# Patient Record
Sex: FEMALE | Race: WHITE | NOT HISPANIC OR LATINO | Employment: UNEMPLOYED | URBAN - NONMETROPOLITAN AREA
[De-identification: names, ages, dates, MRNs, and addresses within clinical notes are randomized per-mention and may not be internally consistent; named-entity substitution may affect disease eponyms.]

---

## 2022-06-25 ENCOUNTER — HOSPITAL ENCOUNTER (EMERGENCY)
Facility: HOSPITAL | Age: 27
Discharge: PSYCHIATRIC OR SUBSTANCE USE FACILITY | End: 2022-06-26
Attending: FAMILY MEDICINE

## 2022-06-25 ENCOUNTER — APPOINTMENT (OUTPATIENT)
Dept: MRI IMAGING | Facility: HOSPITAL | Age: 27
End: 2022-06-25

## 2022-06-25 ENCOUNTER — APPOINTMENT (OUTPATIENT)
Dept: CT IMAGING | Facility: HOSPITAL | Age: 27
End: 2022-06-25

## 2022-06-25 DIAGNOSIS — R45.851 DEPRESSION WITH SUICIDAL IDEATION: Primary | ICD-10-CM

## 2022-06-25 DIAGNOSIS — F32.A DEPRESSION WITH SUICIDAL IDEATION: Primary | ICD-10-CM

## 2022-06-25 DIAGNOSIS — D33.2 BENIGN NEOPLASM OF BRAIN, UNSPECIFIED BRAIN REGION: ICD-10-CM

## 2022-06-25 LAB
ALBUMIN SERPL-MCNC: 3.96 G/DL (ref 3.5–5.2)
ALBUMIN/GLOB SERPL: 1.6 G/DL
ALP SERPL-CCNC: 85 U/L (ref 39–117)
ALT SERPL W P-5'-P-CCNC: 21 U/L (ref 1–33)
AMPHET+METHAMPHET UR QL: NEGATIVE
AMPHETAMINES UR QL: NEGATIVE
ANION GAP SERPL CALCULATED.3IONS-SCNC: 12 MMOL/L (ref 5–15)
AST SERPL-CCNC: 16 U/L (ref 1–32)
B-HCG UR QL: NEGATIVE
BACTERIA UR QL AUTO: ABNORMAL /HPF
BARBITURATES UR QL SCN: NEGATIVE
BASOPHILS # BLD AUTO: 0.04 10*3/MM3 (ref 0–0.2)
BASOPHILS NFR BLD AUTO: 0.5 % (ref 0–1.5)
BENZODIAZ UR QL SCN: NEGATIVE
BILIRUB SERPL-MCNC: 0.2 MG/DL (ref 0–1.2)
BILIRUB UR QL STRIP: NEGATIVE
BUN SERPL-MCNC: 15 MG/DL (ref 6–20)
BUN/CREAT SERPL: 20.3 (ref 7–25)
BUPRENORPHINE SERPL-MCNC: NEGATIVE NG/ML
CALCIUM SPEC-SCNC: 9 MG/DL (ref 8.6–10.5)
CANNABINOIDS SERPL QL: NEGATIVE
CHLORIDE SERPL-SCNC: 105 MMOL/L (ref 98–107)
CLARITY UR: CLEAR
CO2 SERPL-SCNC: 24 MMOL/L (ref 22–29)
COCAINE UR QL: NEGATIVE
COLOR UR: YELLOW
CREAT SERPL-MCNC: 0.74 MG/DL (ref 0.57–1)
DEPRECATED RDW RBC AUTO: 53.8 FL (ref 37–54)
EGFRCR SERPLBLD CKD-EPI 2021: 114.6 ML/MIN/1.73
EOSINOPHIL # BLD AUTO: 0.26 10*3/MM3 (ref 0–0.4)
EOSINOPHIL NFR BLD AUTO: 3.3 % (ref 0.3–6.2)
ERYTHROCYTE [DISTWIDTH] IN BLOOD BY AUTOMATED COUNT: 15.9 % (ref 12.3–15.4)
ETHANOL BLD-MCNC: <10 MG/DL (ref 0–10)
ETHANOL UR QL: <0.01 %
FLUAV SUBTYP SPEC NAA+PROBE: NOT DETECTED
FLUBV RNA ISLT QL NAA+PROBE: NOT DETECTED
GLOBULIN UR ELPH-MCNC: 2.4 GM/DL
GLUCOSE SERPL-MCNC: 95 MG/DL (ref 65–99)
GLUCOSE UR STRIP-MCNC: NEGATIVE MG/DL
HCT VFR BLD AUTO: 35.1 % (ref 34–46.6)
HGB BLD-MCNC: 11.8 G/DL (ref 12–15.9)
HGB UR QL STRIP.AUTO: ABNORMAL
HOLD SPECIMEN: NORMAL
HOLD SPECIMEN: NORMAL
HYALINE CASTS UR QL AUTO: ABNORMAL /LPF
IMM GRANULOCYTES # BLD AUTO: 0.03 10*3/MM3 (ref 0–0.05)
IMM GRANULOCYTES NFR BLD AUTO: 0.4 % (ref 0–0.5)
KETONES UR QL STRIP: ABNORMAL
LEUKOCYTE ESTERASE UR QL STRIP.AUTO: NEGATIVE
LYMPHOCYTES # BLD AUTO: 2.7 10*3/MM3 (ref 0.7–3.1)
LYMPHOCYTES NFR BLD AUTO: 33.9 % (ref 19.6–45.3)
MAGNESIUM SERPL-MCNC: 2 MG/DL (ref 1.6–2.6)
MCH RBC QN AUTO: 31 PG (ref 26.6–33)
MCHC RBC AUTO-ENTMCNC: 33.6 G/DL (ref 31.5–35.7)
MCV RBC AUTO: 92.1 FL (ref 79–97)
METHADONE UR QL SCN: NEGATIVE
MONOCYTES # BLD AUTO: 0.6 10*3/MM3 (ref 0.1–0.9)
MONOCYTES NFR BLD AUTO: 7.5 % (ref 5–12)
NEUTROPHILS NFR BLD AUTO: 4.34 10*3/MM3 (ref 1.7–7)
NEUTROPHILS NFR BLD AUTO: 54.4 % (ref 42.7–76)
NITRITE UR QL STRIP: NEGATIVE
NRBC BLD AUTO-RTO: 0 /100 WBC (ref 0–0.2)
OPIATES UR QL: NEGATIVE
OXYCODONE UR QL SCN: NEGATIVE
PCP UR QL SCN: NEGATIVE
PH UR STRIP.AUTO: 6 [PH] (ref 5–8)
PLATELET # BLD AUTO: 158 10*3/MM3 (ref 140–450)
PMV BLD AUTO: 10.2 FL (ref 6–12)
POTASSIUM SERPL-SCNC: 3.9 MMOL/L (ref 3.5–5.2)
PROPOXYPH UR QL: NEGATIVE
PROT SERPL-MCNC: 6.4 G/DL (ref 6–8.5)
PROT UR QL STRIP: NEGATIVE
RBC # BLD AUTO: 3.81 10*6/MM3 (ref 3.77–5.28)
RBC # UR STRIP: ABNORMAL /HPF
REF LAB TEST METHOD: ABNORMAL
SARS-COV-2 RNA PNL SPEC NAA+PROBE: NOT DETECTED
SODIUM SERPL-SCNC: 141 MMOL/L (ref 136–145)
SP GR UR STRIP: >=1.03 (ref 1–1.03)
SQUAMOUS #/AREA URNS HPF: ABNORMAL /HPF
TRICYCLICS UR QL SCN: NEGATIVE
UROBILINOGEN UR QL STRIP: ABNORMAL
WBC # UR STRIP: ABNORMAL /HPF
WBC NRBC COR # BLD: 7.97 10*3/MM3 (ref 3.4–10.8)
WHOLE BLOOD HOLD COAG: NORMAL
WHOLE BLOOD HOLD SPECIMEN: NORMAL

## 2022-06-25 PROCEDURE — 83735 ASSAY OF MAGNESIUM: CPT | Performed by: PHYSICIAN ASSISTANT

## 2022-06-25 PROCEDURE — 70553 MRI BRAIN STEM W/O & W/DYE: CPT

## 2022-06-25 PROCEDURE — 85025 COMPLETE CBC W/AUTO DIFF WBC: CPT | Performed by: PHYSICIAN ASSISTANT

## 2022-06-25 PROCEDURE — 70470 CT HEAD/BRAIN W/O & W/DYE: CPT

## 2022-06-25 PROCEDURE — 82077 ASSAY SPEC XCP UR&BREATH IA: CPT | Performed by: PHYSICIAN ASSISTANT

## 2022-06-25 PROCEDURE — 25010000002 IOPAMIDOL 61 % SOLUTION: Performed by: STUDENT IN AN ORGANIZED HEALTH CARE EDUCATION/TRAINING PROGRAM

## 2022-06-25 PROCEDURE — 81001 URINALYSIS AUTO W/SCOPE: CPT | Performed by: PHYSICIAN ASSISTANT

## 2022-06-25 PROCEDURE — 0 GADOBENATE DIMEGLUMINE 529 MG/ML SOLUTION: Performed by: STUDENT IN AN ORGANIZED HEALTH CARE EDUCATION/TRAINING PROGRAM

## 2022-06-25 PROCEDURE — 99285 EMERGENCY DEPT VISIT HI MDM: CPT

## 2022-06-25 PROCEDURE — 80306 DRUG TEST PRSMV INSTRMNT: CPT | Performed by: PHYSICIAN ASSISTANT

## 2022-06-25 PROCEDURE — 87636 SARSCOV2 & INF A&B AMP PRB: CPT | Performed by: STUDENT IN AN ORGANIZED HEALTH CARE EDUCATION/TRAINING PROGRAM

## 2022-06-25 PROCEDURE — 36415 COLL VENOUS BLD VENIPUNCTURE: CPT

## 2022-06-25 PROCEDURE — 80053 COMPREHEN METABOLIC PANEL: CPT | Performed by: PHYSICIAN ASSISTANT

## 2022-06-25 PROCEDURE — 81025 URINE PREGNANCY TEST: CPT | Performed by: PHYSICIAN ASSISTANT

## 2022-06-25 PROCEDURE — A9577 INJ MULTIHANCE: HCPCS | Performed by: STUDENT IN AN ORGANIZED HEALTH CARE EDUCATION/TRAINING PROGRAM

## 2022-06-25 PROCEDURE — C9803 HOPD COVID-19 SPEC COLLECT: HCPCS | Performed by: STUDENT IN AN ORGANIZED HEALTH CARE EDUCATION/TRAINING PROGRAM

## 2022-06-25 RX ADMIN — IOPAMIDOL 85 ML: 612 INJECTION, SOLUTION INTRAVENOUS at 20:11

## 2022-06-25 RX ADMIN — GADOBENATE DIMEGLUMINE 15 ML: 529 INJECTION, SOLUTION INTRAVENOUS at 22:10

## 2022-06-26 ENCOUNTER — HOSPITAL ENCOUNTER (INPATIENT)
Facility: HOSPITAL | Age: 27
LOS: 5 days | Discharge: NURSING, REHAB OR LONG-TERM CARE | End: 2022-07-01
Attending: PSYCHIATRY & NEUROLOGY | Admitting: PSYCHIATRY & NEUROLOGY

## 2022-06-26 VITALS
DIASTOLIC BLOOD PRESSURE: 72 MMHG | TEMPERATURE: 97.8 F | HEART RATE: 68 BPM | WEIGHT: 166 LBS | HEIGHT: 64 IN | RESPIRATION RATE: 17 BRPM | BODY MASS INDEX: 28.34 KG/M2 | OXYGEN SATURATION: 100 % | SYSTOLIC BLOOD PRESSURE: 113 MMHG

## 2022-06-26 PROBLEM — R45.851 SUICIDAL IDEATIONS: Status: ACTIVE | Noted: 2022-06-26

## 2022-06-26 LAB
QT INTERVAL: 404 MS
QTC INTERVAL: 416 MS

## 2022-06-26 PROCEDURE — 93005 ELECTROCARDIOGRAM TRACING: CPT | Performed by: PSYCHIATRY & NEUROLOGY

## 2022-06-26 PROCEDURE — 99223 1ST HOSP IP/OBS HIGH 75: CPT | Performed by: PSYCHIATRY & NEUROLOGY

## 2022-06-26 RX ORDER — HYDROXYZINE 50 MG/1
50 TABLET, FILM COATED ORAL EVERY 6 HOURS PRN
Status: DISCONTINUED | OUTPATIENT
Start: 2022-06-26 | End: 2022-07-01 | Stop reason: HOSPADM

## 2022-06-26 RX ORDER — BENZTROPINE MESYLATE 1 MG/ML
1 INJECTION INTRAMUSCULAR; INTRAVENOUS ONCE AS NEEDED
Status: DISCONTINUED | OUTPATIENT
Start: 2022-06-26 | End: 2022-07-01 | Stop reason: HOSPADM

## 2022-06-26 RX ORDER — FAMOTIDINE 20 MG/1
20 TABLET, FILM COATED ORAL 2 TIMES DAILY PRN
Status: DISCONTINUED | OUTPATIENT
Start: 2022-06-26 | End: 2022-07-01 | Stop reason: HOSPADM

## 2022-06-26 RX ORDER — NICOTINE 21 MG/24HR
1 PATCH, TRANSDERMAL 24 HOURS TRANSDERMAL
Status: DISCONTINUED | OUTPATIENT
Start: 2022-06-26 | End: 2022-07-01 | Stop reason: HOSPADM

## 2022-06-26 RX ORDER — BENZONATATE 100 MG/1
100 CAPSULE ORAL 3 TIMES DAILY PRN
Status: DISCONTINUED | OUTPATIENT
Start: 2022-06-26 | End: 2022-07-01 | Stop reason: HOSPADM

## 2022-06-26 RX ORDER — BENZTROPINE MESYLATE 1 MG/1
2 TABLET ORAL ONCE AS NEEDED
Status: DISCONTINUED | OUTPATIENT
Start: 2022-06-26 | End: 2022-07-01 | Stop reason: HOSPADM

## 2022-06-26 RX ORDER — FLUOXETINE HYDROCHLORIDE 20 MG/1
20 CAPSULE ORAL DAILY
COMMUNITY
End: 2022-07-01 | Stop reason: HOSPADM

## 2022-06-26 RX ORDER — ARIPIPRAZOLE 10 MG/1
5 TABLET ORAL NIGHTLY
Status: DISCONTINUED | OUTPATIENT
Start: 2022-06-26 | End: 2022-06-28

## 2022-06-26 RX ORDER — FLUOXETINE HYDROCHLORIDE 20 MG/1
40 CAPSULE ORAL DAILY
Status: DISCONTINUED | OUTPATIENT
Start: 2022-06-26 | End: 2022-07-01 | Stop reason: HOSPADM

## 2022-06-26 RX ORDER — IBUPROFEN 400 MG/1
400 TABLET ORAL EVERY 6 HOURS PRN
Status: DISCONTINUED | OUTPATIENT
Start: 2022-06-26 | End: 2022-07-01 | Stop reason: HOSPADM

## 2022-06-26 RX ORDER — ALUMINA, MAGNESIA, AND SIMETHICONE 2400; 2400; 240 MG/30ML; MG/30ML; MG/30ML
15 SUSPENSION ORAL EVERY 6 HOURS PRN
Status: DISCONTINUED | OUTPATIENT
Start: 2022-06-26 | End: 2022-07-01 | Stop reason: HOSPADM

## 2022-06-26 RX ORDER — ONDANSETRON 4 MG/1
4 TABLET, FILM COATED ORAL EVERY 6 HOURS PRN
Status: DISCONTINUED | OUTPATIENT
Start: 2022-06-26 | End: 2022-07-01 | Stop reason: HOSPADM

## 2022-06-26 RX ORDER — TRAZODONE HYDROCHLORIDE 50 MG/1
50 TABLET ORAL NIGHTLY PRN
Status: DISCONTINUED | OUTPATIENT
Start: 2022-06-26 | End: 2022-07-01 | Stop reason: HOSPADM

## 2022-06-26 RX ORDER — ACETAMINOPHEN 325 MG/1
650 TABLET ORAL EVERY 6 HOURS PRN
Status: DISCONTINUED | OUTPATIENT
Start: 2022-06-26 | End: 2022-07-01 | Stop reason: HOSPADM

## 2022-06-26 RX ORDER — ECHINACEA PURPUREA EXTRACT 125 MG
2 TABLET ORAL AS NEEDED
Status: DISCONTINUED | OUTPATIENT
Start: 2022-06-26 | End: 2022-07-01 | Stop reason: HOSPADM

## 2022-06-26 RX ORDER — FLUOXETINE HYDROCHLORIDE 20 MG/1
20 CAPSULE ORAL DAILY
Status: CANCELLED | OUTPATIENT
Start: 2022-06-26

## 2022-06-26 RX ORDER — LOPERAMIDE HYDROCHLORIDE 2 MG/1
2 CAPSULE ORAL
Status: DISCONTINUED | OUTPATIENT
Start: 2022-06-26 | End: 2022-07-01 | Stop reason: HOSPADM

## 2022-06-26 RX ADMIN — HYDROXYZINE HYDROCHLORIDE 50 MG: 50 TABLET ORAL at 21:45

## 2022-06-26 RX ADMIN — Medication 1 PATCH: at 11:03

## 2022-06-26 RX ADMIN — ARIPIPRAZOLE 5 MG: 10 TABLET ORAL at 21:48

## 2022-06-26 RX ADMIN — FLUOXETINE HYDROCHLORIDE 40 MG: 20 CAPSULE ORAL at 15:19

## 2022-06-27 LAB
25(OH)D3 SERPL-MCNC: 23.7 NG/ML (ref 30–100)
TSH SERPL DL<=0.05 MIU/L-ACNC: 5.46 UIU/ML (ref 0.27–4.2)
VIT B12 BLD-MCNC: 514 PG/ML (ref 211–946)

## 2022-06-27 PROCEDURE — 82607 VITAMIN B-12: CPT | Performed by: PSYCHIATRY & NEUROLOGY

## 2022-06-27 PROCEDURE — 99232 SBSQ HOSP IP/OBS MODERATE 35: CPT | Performed by: PSYCHIATRY & NEUROLOGY

## 2022-06-27 PROCEDURE — 84479 ASSAY OF THYROID (T3 OR T4): CPT | Performed by: PSYCHIATRY & NEUROLOGY

## 2022-06-27 PROCEDURE — 84443 ASSAY THYROID STIM HORMONE: CPT | Performed by: PSYCHIATRY & NEUROLOGY

## 2022-06-27 PROCEDURE — 82306 VITAMIN D 25 HYDROXY: CPT | Performed by: PSYCHIATRY & NEUROLOGY

## 2022-06-27 PROCEDURE — 84436 ASSAY OF TOTAL THYROXINE: CPT | Performed by: PSYCHIATRY & NEUROLOGY

## 2022-06-27 RX ADMIN — ARIPIPRAZOLE 5 MG: 10 TABLET ORAL at 21:43

## 2022-06-27 RX ADMIN — HYDROXYZINE HYDROCHLORIDE 50 MG: 50 TABLET ORAL at 21:43

## 2022-06-27 RX ADMIN — Medication 1 PATCH: at 08:24

## 2022-06-27 RX ADMIN — FLUOXETINE HYDROCHLORIDE 40 MG: 20 CAPSULE ORAL at 08:24

## 2022-06-28 LAB
T-UPTAKE NFR SERPL: 1 TBI (ref 0.8–1.3)
T4 SERPL-MCNC: 8.33 MCG/DL (ref 4.5–11.7)
TSH SERPL DL<=0.05 MIU/L-ACNC: 5.46 UIU/ML (ref 0.27–4.2)

## 2022-06-28 PROCEDURE — 99232 SBSQ HOSP IP/OBS MODERATE 35: CPT | Performed by: PSYCHIATRY & NEUROLOGY

## 2022-06-28 RX ORDER — ARIPIPRAZOLE 10 MG/1
5 TABLET ORAL ONCE
Status: DISCONTINUED | OUTPATIENT
Start: 2022-06-28 | End: 2022-06-28

## 2022-06-28 RX ORDER — ARIPIPRAZOLE 10 MG/1
5 TABLET ORAL DAILY
Status: DISCONTINUED | OUTPATIENT
Start: 2022-06-28 | End: 2022-07-01 | Stop reason: HOSPADM

## 2022-06-28 RX ADMIN — HYDROXYZINE HYDROCHLORIDE 50 MG: 50 TABLET ORAL at 20:45

## 2022-06-28 RX ADMIN — Medication 1 PATCH: at 09:45

## 2022-06-28 RX ADMIN — ARIPIPRAZOLE 5 MG: 10 TABLET ORAL at 09:45

## 2022-06-28 RX ADMIN — FLUOXETINE HYDROCHLORIDE 40 MG: 20 CAPSULE ORAL at 09:45

## 2022-06-29 PROCEDURE — 99232 SBSQ HOSP IP/OBS MODERATE 35: CPT | Performed by: PSYCHIATRY & NEUROLOGY

## 2022-06-29 RX ADMIN — Medication 1 PATCH: at 08:51

## 2022-06-29 RX ADMIN — FLUOXETINE HYDROCHLORIDE 40 MG: 20 CAPSULE ORAL at 08:51

## 2022-06-29 RX ADMIN — ACETAMINOPHEN 650 MG: 325 TABLET ORAL at 09:49

## 2022-06-29 RX ADMIN — ARIPIPRAZOLE 5 MG: 10 TABLET ORAL at 08:51

## 2022-06-30 PROCEDURE — 99232 SBSQ HOSP IP/OBS MODERATE 35: CPT | Performed by: PSYCHIATRY & NEUROLOGY

## 2022-06-30 RX ADMIN — HYDROXYZINE HYDROCHLORIDE 50 MG: 50 TABLET ORAL at 23:17

## 2022-06-30 RX ADMIN — ARIPIPRAZOLE 5 MG: 10 TABLET ORAL at 08:43

## 2022-06-30 RX ADMIN — FLUOXETINE HYDROCHLORIDE 40 MG: 20 CAPSULE ORAL at 08:43

## 2022-06-30 RX ADMIN — Medication 1 PATCH: at 08:43

## 2022-07-01 VITALS
BODY MASS INDEX: 27.79 KG/M2 | TEMPERATURE: 97.9 F | OXYGEN SATURATION: 95 % | WEIGHT: 162.8 LBS | DIASTOLIC BLOOD PRESSURE: 82 MMHG | SYSTOLIC BLOOD PRESSURE: 131 MMHG | RESPIRATION RATE: 18 BRPM | HEIGHT: 64 IN | HEART RATE: 84 BPM

## 2022-07-01 PROBLEM — F33.3 MAJOR DEPRESSIVE DISORDER, RECURRENT EPISODE, SEVERE, WITH PSYCHOSIS: Status: ACTIVE | Noted: 2022-06-26

## 2022-07-01 PROCEDURE — 99239 HOSP IP/OBS DSCHRG MGMT >30: CPT | Performed by: PSYCHIATRY & NEUROLOGY

## 2022-07-01 RX ORDER — ARIPIPRAZOLE 5 MG/1
5 TABLET ORAL DAILY
Qty: 30 TABLET | Refills: 0 | Status: SHIPPED | OUTPATIENT
Start: 2022-07-02 | End: 2022-07-18 | Stop reason: HOSPADM

## 2022-07-01 RX ORDER — FLUOXETINE HYDROCHLORIDE 40 MG/1
40 CAPSULE ORAL DAILY
Qty: 30 CAPSULE | Refills: 0 | Status: ON HOLD | OUTPATIENT
Start: 2022-07-02 | End: 2022-07-18 | Stop reason: SDUPTHER

## 2022-07-01 RX ORDER — TRAZODONE HYDROCHLORIDE 50 MG/1
50 TABLET ORAL NIGHTLY PRN
Qty: 30 TABLET | Refills: 0 | Status: SHIPPED | OUTPATIENT
Start: 2022-07-01 | End: 2022-07-01

## 2022-07-01 RX ADMIN — FLUOXETINE HYDROCHLORIDE 40 MG: 20 CAPSULE ORAL at 08:42

## 2022-07-01 RX ADMIN — ARIPIPRAZOLE 5 MG: 10 TABLET ORAL at 08:42

## 2022-07-01 RX ADMIN — Medication 1 PATCH: at 08:42

## 2022-07-01 NOTE — NURSING NOTE
"Notified Dr. GAMALIEL Philippe and Lily, Therapist and Lily RN, Lead,  of  Zuleyma FELIZ LPN report of Patient verbalizing  goal statement of \" not wanting to rape Daughter\" . Patient denies thoughts of harm rape , suicidal or homicidal ideation to this RN .   "

## 2022-07-01 NOTE — PLAN OF CARE
Goal Outcome Evaluation:  Plan of Care Reviewed With: patient  Patient Agreement with Plan of Care: agrees        Outcome Evaluation: Patient denies anxiety, depression, SI/HI, or AVH.  Patient cooperative this shift.

## 2022-07-01 NOTE — PROGRESS NOTES
" Inpatient Psych Progress Note     Clinician: Augie Philippe MD  Admission Date: 6/26/2022  08:42 EDT 07/01/22    Behavioral Health Treatment Plan and Problem List: I have reviewed and approved the Behavioral Health Treatment Plan and Problem list.    Allergies  Allergies   Allergen Reactions   • Augmentin [Amoxicillin-Pot Clavulanate] Anaphylaxis and Hives   • Latex Anaphylaxis and Hives   • Phenergan [Promethazine] Anaphylaxis and Hives   • Seroquel [Quetiapine] Anaphylaxis and Hives       Hospital Day: 5 days      Assessment completed within view of staff    History  CC/clinical focus: depression    Interval HPI: Patient seen and evaluated by me.  Chart reviewed.  During group patient stated that her goal was to avoid raping her child.    Patient reports that depression much improved since admission.  No SI.  She is able to express hope in the future and is future oriented.  Denies cravings for drugs this morning.  Med Compliant.  ROS otherwise as below.      Interval Review of Systems:   General ROS: negative for - fever or malaise  Endocrine ROS: negative for - palpitations  Respiratory ROS: no cough, shortness of breath, or wheezing  Cardiovascular ROS: no chest pain or dyspnea on exertion  Gastrointestinal ROS: no abdominal pain,no black or bloody stools    /82 (BP Location: Right arm, Patient Position: Lying)   Pulse 84   Temp 97.9 °F (36.6 °C) (Temporal)   Resp 18   Ht 162.6 cm (64\")   Wt 73.8 kg (162 lb 12.8 oz)   LMP  (LMP Unknown)   SpO2 95%   BMI 27.94 kg/m²     Mental Status Exam  Mood: \"okay\"  Affect: mood congruent  Thought Processes: linear  Thought Content: No Delusions voiced  Hallucinations: Denies  Suicidal Thoughts: Denies  Suicidal Plan/Intent: Denies          Medical Decision Making:   Labs:     Lab Results (last 24 hours)     ** No results found for the last 24 hours. **            Radiology:     Imaging Results (Last 24 Hours)     ** No results found for the last 24 hours. ** "            EKG:     ECG/EMG Results (most recent)     Procedure Component Value Units Date/Time    ECG 12 Lead [[id number]] Collected: 06/26/22 0336     Updated: 06/26/22 2256     QT Interval 404 ms      QTC Interval 416 ms     Narrative:      Test Reason : Baseline Cardiac Status  Blood Pressure :   */*   mmHG  Vent. Rate :  64 BPM     Atrial Rate :  64 BPM     P-R Int : 190 ms          QRS Dur : 102 ms      QT Int : 404 ms       P-R-T Axes :  64  63  57 degrees     QTc Int : 416 ms    Normal sinus rhythm with sinus arrhythmia  Normal ECG  No previous ECGs available  Confirmed by Chayo Farmer ([id number]) on 6/26/2022 10:56:27 PM    Referred By:            Confirmed By: Chayo Farmer           Medications:  ARIPiprazole, 5 mg, Oral, Daily  FLUoxetine, 40 mg, Oral, Daily  nicotine, 1 patch, Transdermal, Q24H           All medications reviewed.      Assessment and Plan:    Major Depressive Disorder, recurrent, severe with psychosis  Continue prozac to 40mg Daily   Continue Abilify to AM 5mg         Amphetamine Use Disorder,   Opiate Use Disorder  - +cravings, no withdrawal Sx today.  - CD Counseling  - We have secured a bed at [facility]. Will plan to discharge later today or on Saturday directly to [facility].  Will prepare medications and discharge summary.        Brain Tumor (B9)  - Patient doesn't know the type, present since birth, B9.  Followup with neurosurgery, a Dr. Romero in [place], but last visit was 8 years ago. MRI complete on 6/25 - shows 2.5cm neoplasm.   - We have setup a followup appointment with neurology.  During group patient stated that her goal was to avoid raping her child.  Will discuss with Lliy and address accordingly.    Continue hospitalization for safety and stabilization.  Continue current level of Special Precautions (q15 minute checks).

## 2022-07-01 NOTE — NURSING NOTE
"PT APPROACHED THIS STAFF MEMBER AFTER WHAT GOALS SHE WANTED TO SET FOR HERSELF TODAY AND STATED 'I WANT TO CHANGE MY GOAL TO I DON'T WANT TO RAPE MY DAUGHTER PIPER.\" when ASKED IF SHE HAD EVER DONE THIS SHE REPLIED \"NO\" AND WHEN ASKED IF SHE HAD EVER HAD THESE THOUGHTS BEFORE SHE REPLIED \"NO\".  AFTER WALKING AWAY FROM PT, THE PT CAME TO STAFF MEMBER BERRY STATING AGAIN THAT SHE DID NOT WANT TO RAPE HER DAUGHTER PIPER. PT'S RN AND THERAPIST MADE AWARE OF PTS COMMENTS. MD DR GAMALIEL KRUGER ALSO MADE AWARE  "

## 2022-07-01 NOTE — DISCHARGE SUMMARY
Date of Discharge:  2022    Discharge Diagnosis:Principal Problem:    Major depressive disorder, recurrent episode, severe, with psychosis (HCC)        Presenting Problem/History of Present Illness:  26 y.o. female who was admitted on 2022 and evaluated on 2022 with complaints of depression. Patient  presents to our ER with complaints of auditory hallucinations and parnoia that have been occurring for the past 3 months. Patient  states that these are usually voices that talk about , or sound like her mother who  when patient was 12. Patient reports worsening depression and suicidal ideation. Patient  reports holding a knife to wrist yesterday, but pulling it back before she harmed herself.  Patient states that she uses meth, suboxone, and marijuana. Patient denies any alcohol abuse. Patient states that she uses tobacco. Patient states not having her children as a stressor in her life. Patient denies any history of physical, mental or sexual abuse. Patient rates her appetite as fair. Patient rates her sleep as fair. Patient denies any nightmares. Patient rates her anxiety on a scale of 1/10 with 10 being the most severe a 10. Patient rates her depression on a scale of 1/10 with 10 being the most severe a 10. Patient states that she has suicidal ideation. Patient denies any homicidal ideation. Patient states that she has hallucinations.     Hospital Course:  Patient was admitted for safety and stabilization.   Routine labs were checked.  Patient was assigned a masters level therapist and provided with an opportunity to participate in group and individual therapy and counseling on the unit.  Patient started on Abilify 5mg at bedtime and increased home dose of prozac from 20mg to 40mg.  Patient tolerated well and noted significant benefit.  Patient voiced significant concerns regarding past drug use and expressed strong interest in discharge to a rehab facility.  Arrangements made to secure a bed  for her at [facility].  She was discharged in stable condition to the facility.    It should also be noted that the patient has a known h/o B9 Brain Tumor. Patient doesn't know the type, present since birth, B9.  Followup with neurosurgery, a Dr. Romero in [place], but last visit was 8 years ago. MRI complete on 6/25 - shows 2.5cm neoplasm.   - She will need to followup as an outpatient.        Consults:   Consults     No orders found from 5/28/2022 to 6/27/2022.          Labs:  Lab Results (all)     Procedure Component Value Units Date/Time    Thyroid Panel With TSH [[id number]]  (Abnormal) Collected: 06/27/22 0515    Specimen: Blood Updated: 06/28/22 0506     TSH 5.460 uIU/mL      T Uptake 1.00 TBI      T4, Total 8.33 mcg/dL      Comment: T4 results may be falsely increased if patient taking Biotin.       Vitamin D 25 Hydroxy [[id number]]  (Abnormal) Collected: 06/27/22 0515    Specimen: Blood Updated: 06/27/22 1351     25 Hydroxy, Vitamin D 23.7 ng/ml     Narrative:      Reference Range for Total Vitamin D 25(OH)     Deficiency <20.0 ng/mL   Insufficiency 21-29 ng/mL   Sufficiency  ng/mL  Toxicity >100 ng/ml    Results may be falsely increased if patient taking Biotin.      Vitamin B12 [[id number]]  (Normal) Collected: 06/27/22 0515    Specimen: Blood Updated: 06/27/22 1351     Vitamin B-12 514 pg/mL     Narrative:      Results may be falsely increased if patient taking Biotin.      TSH [[id number]]  (Abnormal) Collected: 06/27/22 0515    Specimen: Blood Updated: 06/27/22 0640     TSH 5.460 uIU/mL           Imaging:  Imaging Results (All)     None            Condition on Discharge: stable    Interval Review of Systems at time of Discharge:  General ROS: negative for - fever.    Endocrine ROS: negative for - palpitations  Respiratory ROS: no cough, shortness of breath, or wheezing  Cardiovascular ROS: no chest pain or dyspnea on exertion  Gastrointestinal ROS: no abdominal pain,no black or bloody  stools    Mental Status Exam at time of Discharge:  Patient’s sensorium is intact.  Behavior is pleasant and cooperative. Speech is clear, fluent, and of average rate/rhythm/volume. Mood is euthymic. Affect mood congruent. Thought processes are organized and goal directed. No AVH. No delusions voiced.  Insight and Judgment are intact. Denies SI.  Denies HI.      Vital Signs  Temp:  [97.9 °F (36.6 °C)-98 °F (36.7 °C)] 97.9 °F (36.6 °C)  Heart Rate:  [73-84] 84  Resp:  [18] 18  BP: (118-131)/(76-82) 131/82    Discharge Disposition  Rehab Facility or Unit (DC - External)    Discharge Medications     Discharge Medications      New Medications      Instructions Start Date   ARIPiprazole 5 MG tablet  Commonly known as: ABILIFY   5 mg, Oral, Daily   Start Date: July 2, 2022        Changes to Medications      Instructions Start Date   FLUoxetine 40 MG capsule  Commonly known as: PROzac  What changed:   · medication strength  · how much to take   40 mg, Oral, Daily   Start Date: July 2, 2022            Discharge Diet: Regular    Activity at Discharge: As tolerated    Follow-up Appointments:    Future Appointments   Date Time Provider Department Center   7/13/2022 11:30 AM Houston Banks MD [facility]           Time: I spent 33 minutes on this discharge activity which included: face-to-face encounter with the patient, reviewing the data in the system, coordination of the care with the nursing staff as well as consultants, documentation, and entering orders.      Augie Philippe MD  07/01/22  16:35 EDT

## 2022-07-01 NOTE — PLAN OF CARE
Problem: Adult Behavioral Health Plan of Care  Goal: Optimized Coping Skills in Response to Life Stressors  Outcome: Ongoing, Progressing  Intervention: Promote Effective Coping Strategies  Flowsheets (Taken 7/1/2022 0850)  Supportive Measures:  • active listening utilized  • counseling provided  Goal: Develops/Participates in Therapeutic [place] to Support Successful Transition  Outcome: Ongoing, Progressing  Intervention: Foster Therapeutic [place]  Flowsheets (Taken 7/1/2022 0753 by Elvira Mack RN)  Trust Relationship/Rapport:  • care explained  • emotional support provided  • questions answered  • questions encouraged  • thoughts/feelings acknowledged  Intervention: Mutually Develop Transition Plan  Flowsheets (Taken 6/27/2022 1023)  Transition Support:  • community resources reviewed  • follow-up care coordinated  • follow-up care discussed  • crisis management plan promoted      0850:     DATA:      Therapist met individually with patient this date. Patient agreeable. Reviewed medical record and staffed case with treatment team this date.       Clinical Maneuvering/Intervention:     Therapist assisted patient in processing above session content; acknowledged and normalized patient’s thoughts, feelings, and concerns.  Discussed the therapist/patient relationship and explain the parameters and limitations of relative confidentiality.  Also discussed the importance of active participation, and honesty to the treatment process.  Encouraged the patient to discuss/vent their feelings, frustrations, and fears concerning their ongoing medical issues and validated their feelings.    Concern was voiced regarding substance use and educated patient on risks associated with use. Patient was advised to abstain from use and educated on community resources that can help with sobriety and recovery.        Allowed patient to freely discuss issues without interruption or judgment. Provided safe, confidential  "environment to facilitate the development of positive therapeutic relationship and encourage open, honest communication.      Therapist addressed discharge safety planning this date. Assisted patient in identifying risk factors which would indicate the need for higher level of care after discharge;  including thoughts to harm self or others and/or self-harming behavior. Encouraged patient to call 911, or present to the nearest emergency room should any of these events occur. Discussed crisis intervention services and means to access.  Encouraged securing any objects of harm.          ASSESSMENT:      The patient was seen 1-1 in the office and Dr. Augie Philippe.  Patient denies SI/HI/AVH. Patient rates depression/anxiety at 0/10.  Patient future oriented and lists therapy as a healthy coping.  Patient reports good sleep and appetite.  RN staff had approached therapist about patient's report earlier in the day. Apparently patient had mentioned that her goal was to \"not rape her daughter.\"  Therapist reviewed this with Dr. Philippe and patient.  Patient reports that this has been a fear based and anxious thought that she has. She denies intent and states that she would never want to harm her children.  Currently her children are not in her custody and she does not have access. Therapist has been in contact with patient's sister Lucretia and confirmed that patient does not have contact with children at this time.  Patient adamantly denies intent of harm to her children.  This does not appear to meet criteria for dcbs reporting.     Contacted patient's sister Lucretia at [phone] and provided update. Lucretia agreeable and given contact for [facility].     Spoke with [facility] intake. They are agreeable to take patient today.  They were informed of her neuro appointment on 7/13 and agreeable. No other issues identified.         PLAN:       Patient can now discharge today to [facility].  Staff agreeable for intake " today.  Therapist to arrange [facility].      Therapist recommends residential compliance. Patient scheduled with Landen Deleon this date.

## 2022-07-13 ENCOUNTER — HOSPITAL ENCOUNTER (EMERGENCY)
Facility: HOSPITAL | Age: 27
Discharge: PSYCHIATRIC OR SUBSTANCE USE FACILITY | End: 2022-07-14
Attending: EMERGENCY MEDICINE

## 2022-07-13 VITALS
RESPIRATION RATE: 18 BRPM | TEMPERATURE: 98.7 F | WEIGHT: 169.97 LBS | SYSTOLIC BLOOD PRESSURE: 97 MMHG | OXYGEN SATURATION: 95 % | HEIGHT: 64 IN | BODY MASS INDEX: 29.02 KG/M2 | DIASTOLIC BLOOD PRESSURE: 64 MMHG | HEART RATE: 72 BPM

## 2022-07-13 DIAGNOSIS — R44.0 AUDITORY HALLUCINATIONS: ICD-10-CM

## 2022-07-13 DIAGNOSIS — R45.851 SUICIDAL IDEATION: Primary | ICD-10-CM

## 2022-07-13 DIAGNOSIS — F19.11 HISTORY OF SUBSTANCE ABUSE: ICD-10-CM

## 2022-07-13 DIAGNOSIS — U07.1 COVID-19: ICD-10-CM

## 2022-07-13 PROBLEM — F32.A DEPRESSION: Status: ACTIVE | Noted: 2022-07-13

## 2022-07-13 LAB
ALBUMIN SERPL-MCNC: 4.2 G/DL (ref 3.5–5.2)
ALBUMIN/GLOB SERPL: 1.4 G/DL
ALP SERPL-CCNC: 77 U/L (ref 39–117)
ALT SERPL W P-5'-P-CCNC: 31 U/L (ref 1–33)
AMPHET+METHAMPHET UR QL: NEGATIVE
ANION GAP SERPL CALCULATED.3IONS-SCNC: 10.2 MMOL/L (ref 5–15)
APAP SERPL-MCNC: <5 MCG/ML (ref 0–30)
AST SERPL-CCNC: 22 U/L (ref 1–32)
BARBITURATES UR QL SCN: NEGATIVE
BASOPHILS # BLD AUTO: 0.03 10*3/MM3 (ref 0–0.2)
BASOPHILS NFR BLD AUTO: 0.6 % (ref 0–1.5)
BENZODIAZ UR QL SCN: NEGATIVE
BILIRUB SERPL-MCNC: 0.4 MG/DL (ref 0–1.2)
BUN SERPL-MCNC: 19 MG/DL (ref 6–20)
BUN/CREAT SERPL: 25.3 (ref 7–25)
CALCIUM SPEC-SCNC: 9.4 MG/DL (ref 8.6–10.5)
CANNABINOIDS SERPL QL: NEGATIVE
CHLORIDE SERPL-SCNC: 107 MMOL/L (ref 98–107)
CO2 SERPL-SCNC: 24.8 MMOL/L (ref 22–29)
COCAINE UR QL: NEGATIVE
CREAT SERPL-MCNC: 0.75 MG/DL (ref 0.57–1)
DEPRECATED RDW RBC AUTO: 46.5 FL (ref 37–54)
EGFRCR SERPLBLD CKD-EPI 2021: 112.8 ML/MIN/1.73
EOSINOPHIL # BLD AUTO: 0.18 10*3/MM3 (ref 0–0.4)
EOSINOPHIL NFR BLD AUTO: 3.6 % (ref 0.3–6.2)
ERYTHROCYTE [DISTWIDTH] IN BLOOD BY AUTOMATED COUNT: 14.4 % (ref 12.3–15.4)
ETHANOL BLD-MCNC: <10 MG/DL (ref 0–10)
ETHANOL UR QL: <0.01 %
GLOBULIN UR ELPH-MCNC: 2.9 GM/DL
GLUCOSE SERPL-MCNC: 144 MG/DL (ref 65–99)
HCG INTACT+B SERPL-ACNC: <0.5 MIU/ML
HCT VFR BLD AUTO: 38.1 % (ref 34–46.6)
HGB BLD-MCNC: 13.3 G/DL (ref 12–15.9)
HOLD SPECIMEN: NORMAL
HOLD SPECIMEN: NORMAL
IMM GRANULOCYTES # BLD AUTO: 0.07 10*3/MM3 (ref 0–0.05)
IMM GRANULOCYTES NFR BLD AUTO: 1.4 % (ref 0–0.5)
LYMPHOCYTES # BLD AUTO: 1.55 10*3/MM3 (ref 0.7–3.1)
LYMPHOCYTES NFR BLD AUTO: 31.2 % (ref 19.6–45.3)
MCH RBC QN AUTO: 31.1 PG (ref 26.6–33)
MCHC RBC AUTO-ENTMCNC: 34.9 G/DL (ref 31.5–35.7)
MCV RBC AUTO: 89.2 FL (ref 79–97)
METHADONE UR QL SCN: NEGATIVE
MONOCYTES # BLD AUTO: 0.39 10*3/MM3 (ref 0.1–0.9)
MONOCYTES NFR BLD AUTO: 7.8 % (ref 5–12)
NEUTROPHILS NFR BLD AUTO: 2.75 10*3/MM3 (ref 1.7–7)
NEUTROPHILS NFR BLD AUTO: 55.4 % (ref 42.7–76)
NRBC BLD AUTO-RTO: 0 /100 WBC (ref 0–0.2)
OPIATES UR QL: NEGATIVE
OXYCODONE UR QL SCN: NEGATIVE
PLATELET # BLD AUTO: 202 10*3/MM3 (ref 140–450)
PMV BLD AUTO: 10.4 FL (ref 6–12)
POTASSIUM SERPL-SCNC: 3.8 MMOL/L (ref 3.5–5.2)
PROT SERPL-MCNC: 7.1 G/DL (ref 6–8.5)
RBC # BLD AUTO: 4.27 10*6/MM3 (ref 3.77–5.28)
SALICYLATES SERPL-MCNC: <0.3 MG/DL
SARS-COV-2 RNA RESP QL NAA+PROBE: DETECTED
SODIUM SERPL-SCNC: 142 MMOL/L (ref 136–145)
WBC NRBC COR # BLD: 4.97 10*3/MM3 (ref 3.4–10.8)
WHOLE BLOOD HOLD COAG: NORMAL
WHOLE BLOOD HOLD SPECIMEN: NORMAL

## 2022-07-13 PROCEDURE — 80307 DRUG TEST PRSMV CHEM ANLYZR: CPT | Performed by: EMERGENCY MEDICINE

## 2022-07-13 PROCEDURE — 80053 COMPREHEN METABOLIC PANEL: CPT | Performed by: EMERGENCY MEDICINE

## 2022-07-13 PROCEDURE — 80179 DRUG ASSAY SALICYLATE: CPT | Performed by: EMERGENCY MEDICINE

## 2022-07-13 PROCEDURE — 82077 ASSAY SPEC XCP UR&BREATH IA: CPT | Performed by: EMERGENCY MEDICINE

## 2022-07-13 PROCEDURE — U0003 INFECTIOUS AGENT DETECTION BY NUCLEIC ACID (DNA OR RNA); SEVERE ACUTE RESPIRATORY SYNDROME CORONAVIRUS 2 (SARS-COV-2) (CORONAVIRUS DISEASE [COVID-19]), AMPLIFIED PROBE TECHNIQUE, MAKING USE OF HIGH THROUGHPUT TECHNOLOGIES AS DESCRIBED BY CMS-2020-01-R: HCPCS | Performed by: EMERGENCY MEDICINE

## 2022-07-13 PROCEDURE — 84702 CHORIONIC GONADOTROPIN TEST: CPT | Performed by: EMERGENCY MEDICINE

## 2022-07-13 PROCEDURE — 85025 COMPLETE CBC W/AUTO DIFF WBC: CPT | Performed by: EMERGENCY MEDICINE

## 2022-07-13 PROCEDURE — 99284 EMERGENCY DEPT VISIT MOD MDM: CPT

## 2022-07-13 PROCEDURE — 80143 DRUG ASSAY ACETAMINOPHEN: CPT | Performed by: EMERGENCY MEDICINE

## 2022-07-13 RX ORDER — HALOPERIDOL 5 MG/1
5 TABLET ORAL EVERY 4 HOURS PRN
Status: DISCONTINUED | OUTPATIENT
Start: 2022-07-13 | End: 2022-07-18 | Stop reason: HOSPADM

## 2022-07-13 RX ORDER — LORAZEPAM 2 MG/1
2 TABLET ORAL EVERY 4 HOURS PRN
Status: DISCONTINUED | OUTPATIENT
Start: 2022-07-13 | End: 2022-07-15

## 2022-07-13 RX ORDER — DIPHENHYDRAMINE HYDROCHLORIDE 50 MG/ML
50 INJECTION INTRAMUSCULAR; INTRAVENOUS EVERY 4 HOURS PRN
Status: DISCONTINUED | OUTPATIENT
Start: 2022-07-13 | End: 2022-07-18 | Stop reason: HOSPADM

## 2022-07-13 RX ORDER — HYDROXYZINE PAMOATE 50 MG/1
50 CAPSULE ORAL EVERY 6 HOURS PRN
Status: DISCONTINUED | OUTPATIENT
Start: 2022-07-13 | End: 2022-07-18 | Stop reason: HOSPADM

## 2022-07-13 RX ORDER — LORAZEPAM 2 MG/ML
2 INJECTION INTRAMUSCULAR EVERY 4 HOURS PRN
Status: DISCONTINUED | OUTPATIENT
Start: 2022-07-13 | End: 2022-07-15

## 2022-07-13 RX ORDER — TRAZODONE HYDROCHLORIDE 100 MG/1
100 TABLET ORAL NIGHTLY PRN
Status: DISCONTINUED | OUTPATIENT
Start: 2022-07-13 | End: 2022-07-18 | Stop reason: HOSPADM

## 2022-07-13 RX ORDER — LOPERAMIDE HYDROCHLORIDE 2 MG/1
2 CAPSULE ORAL
Status: DISCONTINUED | OUTPATIENT
Start: 2022-07-13 | End: 2022-07-18 | Stop reason: HOSPADM

## 2022-07-13 RX ORDER — ALUMINA, MAGNESIA, AND SIMETHICONE 2400; 2400; 240 MG/30ML; MG/30ML; MG/30ML
15 SUSPENSION ORAL EVERY 6 HOURS PRN
Status: DISCONTINUED | OUTPATIENT
Start: 2022-07-13 | End: 2022-07-18 | Stop reason: HOSPADM

## 2022-07-13 RX ORDER — ACETAMINOPHEN 325 MG/1
650 TABLET ORAL EVERY 4 HOURS PRN
Status: DISCONTINUED | OUTPATIENT
Start: 2022-07-13 | End: 2022-07-18 | Stop reason: HOSPADM

## 2022-07-13 RX ORDER — DIPHENHYDRAMINE HCL 50 MG
50 CAPSULE ORAL EVERY 4 HOURS PRN
Status: DISCONTINUED | OUTPATIENT
Start: 2022-07-13 | End: 2022-07-18 | Stop reason: HOSPADM

## 2022-07-13 RX ORDER — NICOTINE 21 MG/24HR
1 PATCH, TRANSDERMAL 24 HOURS TRANSDERMAL
Status: DISCONTINUED | OUTPATIENT
Start: 2022-07-14 | End: 2022-07-18 | Stop reason: HOSPADM

## 2022-07-13 RX ORDER — HALOPERIDOL 5 MG/ML
5 INJECTION INTRAMUSCULAR EVERY 4 HOURS PRN
Status: DISCONTINUED | OUTPATIENT
Start: 2022-07-13 | End: 2022-07-18 | Stop reason: HOSPADM

## 2022-07-14 ENCOUNTER — HOSPITAL ENCOUNTER (INPATIENT)
Facility: HOSPITAL | Age: 27
LOS: 4 days | Discharge: HOME | End: 2022-07-18
Attending: PSYCHIATRY & NEUROLOGY | Admitting: PSYCHIATRY & NEUROLOGY

## 2022-07-14 PROBLEM — U07.1 COVID-19 VIRUS DETECTED: Status: ACTIVE | Noted: 2022-07-14

## 2022-07-14 RX ORDER — ARIPIPRAZOLE 5 MG/1
5 TABLET ORAL DAILY
Status: DISCONTINUED | OUTPATIENT
Start: 2022-07-14 | End: 2022-07-15

## 2022-07-14 RX ORDER — RISPERIDONE 1 MG/1
1 TABLET ORAL 2 TIMES DAILY
COMMUNITY
End: 2022-07-18 | Stop reason: HOSPADM

## 2022-07-14 RX ORDER — IBUPROFEN 400 MG/1
400 TABLET ORAL EVERY 6 HOURS PRN
COMMUNITY
End: 2022-07-18 | Stop reason: HOSPADM

## 2022-07-14 RX ORDER — MULTIPLE VITAMINS W/ MINERALS TAB 9MG-400MCG
1 TAB ORAL DAILY
COMMUNITY
End: 2022-07-18 | Stop reason: HOSPADM

## 2022-07-14 RX ORDER — FLUOXETINE HYDROCHLORIDE 20 MG/1
40 CAPSULE ORAL DAILY
Status: DISCONTINUED | OUTPATIENT
Start: 2022-07-14 | End: 2022-07-18 | Stop reason: HOSPADM

## 2022-07-14 RX ORDER — CYCLOBENZAPRINE HCL 5 MG
5 TABLET ORAL 3 TIMES DAILY PRN
COMMUNITY
End: 2022-07-18 | Stop reason: HOSPADM

## 2022-07-14 RX ORDER — HYDROXYZINE PAMOATE 25 MG/1
25 CAPSULE ORAL 3 TIMES DAILY PRN
Status: ON HOLD | COMMUNITY
End: 2022-07-18 | Stop reason: SDUPTHER

## 2022-07-14 RX ORDER — DOXEPIN HYDROCHLORIDE 10 MG/1
10 CAPSULE ORAL NIGHTLY
COMMUNITY
End: 2022-07-18 | Stop reason: HOSPADM

## 2022-07-14 RX ADMIN — LORAZEPAM 2 MG: 2 TABLET ORAL at 18:16

## 2022-07-14 RX ADMIN — FLUOXETINE 40 MG: 20 CAPSULE ORAL at 12:48

## 2022-07-14 RX ADMIN — NICOTINE 1 PATCH: 21 PATCH, EXTENDED RELEASE TRANSDERMAL at 09:26

## 2022-07-14 RX ADMIN — ARIPIPRAZOLE 5 MG: 5 TABLET ORAL at 12:48

## 2022-07-14 RX ADMIN — LORAZEPAM 2 MG: 2 TABLET ORAL at 07:54

## 2022-07-14 NOTE — ED PROVIDER NOTES
Subjective   History of Present Illness    Review of Systems    Past Medical History:   Diagnosis Date   • Brain tumor (HCC)        Allergies   Allergen Reactions   • Augmentin [Amoxicillin-Pot Clavulanate] Anaphylaxis and Hives   • Latex Anaphylaxis and Hives   • Phenergan [Promethazine] Anaphylaxis and Hives   • Seroquel [Quetiapine] Anaphylaxis and Hives       Past Surgical History:   Procedure Laterality Date   • TONSILLECTOMY         History reviewed. No pertinent family history.    Social History     Socioeconomic History   • Marital status: Single   Tobacco Use   • Smoking status: Current Every Day Smoker     Years: 1.00     Types: Cigarettes   Substance and Sexual Activity   • Alcohol use: Not Currently   • Drug use: Yes     Types: Methamphetamines     Comment: suboxone   • Sexual activity: Defer         Objective   Physical Exam    Procedures         ED Course                                  MDM    Final diagnoses:   None       Documentation assistance provided by ann Esparza.  Information recorded by the scribe was done at my direction and has been verified and validated by me.     Ramone Esparza  07/14/22 0149

## 2022-07-14 NOTE — ED PROVIDER NOTES
Subjective   History of Present Illness    Review of Systems    Past Medical History:   Diagnosis Date   • Brain tumor (HCC)        Allergies   Allergen Reactions   • Augmentin [Amoxicillin-Pot Clavulanate] Anaphylaxis and Hives   • Latex Anaphylaxis and Hives   • Phenergan [Promethazine] Anaphylaxis and Hives   • Seroquel [Quetiapine] Anaphylaxis and Hives       Past Surgical History:   Procedure Laterality Date   • TONSILLECTOMY         History reviewed. No pertinent family history.    Social History     Socioeconomic History   • Marital status: Single   Tobacco Use   • Smoking status: Current Every Day Smoker     Years: 1.00     Types: Cigarettes   Substance and Sexual Activity   • Alcohol use: Not Currently   • Drug use: Yes     Types: Methamphetamines     Comment: suboxone   • Sexual activity: Defer         Objective   Physical Exam    Procedures         ED Course                                  MDM    Final diagnoses:   None       Documentation assistance provided by ann Esparza.  Information recorded by the scribe was done at my direction and has been verified and validated by me.     Ramone Esparza  07/14/22 0155

## 2022-07-14 NOTE — PLAN OF CARE
Goal Outcome Evaluation:              Outcome Evaluation: Pt new admission [facility] ED[facility]  States that she is hearing voices that are telling her to rape her babies.  Also stated that she wants to slit  her wrists with a knife.  Calm and cooperative.  Resting throughout shift.  Safety sitter at bedside.  Will continue to monitor.

## 2022-07-14 NOTE — PLAN OF CARE
"Goal Outcome Evaluation:  Plan of Care Reviewed With: patient  Patient Agreement with Plan of Care: agrees  Voluntary admission from the e.d.Pt resting quietly in bed with close watch assistant at bedside. Pt awakened when her name was called.States the reason for admission \"I want to kill myself\". States she was at a substance abuse treatment facility in [place] for a short time she thinks for maybe a day and was brought to the e.d due to suicidal ideations. States she was at the substance abuse treatment center for using \"dope\" and described this as methamphetamines. States she use to use meth daily but not recently ,stated she doesn't know when her last use was.Tox screen is negative.Pt stated she had been in alf for 2 months for nonpayment of child support and thinks she was released about 1-2 weeks ago but wasn't sure.Pt states she wanted to kill her self by cutting her wrist.Reports past psychiatric admissions  to [facility] she believes but was unable to give a time line as when.Pt reports she has been hearing voices telling her she had raped her daughter but states she knows she did not. States her children are with her mother in law.Pt states she has been taking her medications as prescribed.Treatment plan reviewed with pt and initiated.      "

## 2022-07-14 NOTE — ED PROVIDER NOTES
Time: 8:08 PM EDT  Arrived by: private car  Chief Complaint: suicidal ideation   History provided by: patient   History is limited by: N/A     History of Present Illness:  Patient is a 26 y.o. year old female that presents to the emergency department with auditory hallucinations and suicidal ideations. Patient states she has been hearing voices x 1 month that tell her she raped her children. Patient denies visual hallucinations. Denies PMHX of depression or suicidal attempt . Patient states she has been wanting to slit her wrist with a knife. She states she takes a psychiatric medication but doesn't remember the name. She states she has been in a rehabilitation center since yesterday for methamphetamine and Suboxone use. She states last methamphetamine use was 2 weeks ago and suboxone use was several months ago     HPI    Similar Symptoms Previously: yes  Recently seen: yes      Patient Care Team  Primary Care Provider: Provider, No Known    Past Medical History:     Allergies   Allergen Reactions   • Augmentin [Amoxicillin-Pot Clavulanate] Anaphylaxis and Hives   • Latex Anaphylaxis and Hives   • Phenergan [Promethazine] Anaphylaxis and Hives   • Seroquel [Quetiapine] Anaphylaxis and Hives     Past Medical History:   Diagnosis Date   • Brain tumor (HCC)      Past Surgical History:   Procedure Laterality Date   • TONSILLECTOMY       History reviewed. No pertinent family history.    Home Medications:  Prior to Admission medications    Medication Sig Start Date End Date Taking? Authorizing Provider   ARIPiprazole (ABILIFY) 5 MG tablet Take 1 tablet by mouth Daily. Indications: Major Depressive Disorder 7/2/22   uAgie Philippe MD   FLUoxetine (PROzac) 40 MG capsule Take 1 capsule by mouth Daily. Indications: Depression 7/2/22   Augie Philippe MD   traZODone (DESYREL) 50 MG tablet Take 1 tablet by mouth At Night As Needed for Sleep. Indications: Trouble Sleeping 7/1/22 7/1/22  Augie Philippe MD        Social  "History:   Social History     Tobacco Use   • Smoking status: Current Every Day Smoker     Years: 1.00     Types: Cigarettes   Substance Use Topics   • Alcohol use: Not Currently   • Drug use: Yes     Types: Methamphetamines     Comment: suboxone         Review of Systems:  Review of Systems   Constitutional: Negative for chills, diaphoresis and fever.   HENT: Negative for congestion, postnasal drip, rhinorrhea and sore throat.    Eyes: Negative for photophobia.   Respiratory: Negative for cough, chest tightness and shortness of breath.    Cardiovascular: Negative for chest pain, palpitations and leg swelling.   Gastrointestinal: Negative for abdominal pain, diarrhea, nausea and vomiting.   Genitourinary: Negative for difficulty urinating, dysuria, flank pain, frequency, hematuria and urgency.   Musculoskeletal: Negative for neck pain and neck stiffness.   Skin: Negative for pallor and rash.   Neurological: Negative for dizziness, syncope, weakness, numbness and headaches.   Hematological: Negative for adenopathy. Does not bruise/bleed easily.   Psychiatric/Behavioral: Positive for hallucinations (auditory) and suicidal ideas.        Physical Exam:  BP 97/64 (Patient Position: Lying)   Pulse 72   Temp 98.7 °F (37.1 °C) (Oral)   Resp 18   Ht 162.6 cm (64\")   Wt 77.1 kg (169 lb 15.6 oz)   LMP 07/13/2022 (Exact Date)   SpO2 95%   BMI 29.18 kg/m²     Physical Exam  Constitutional:       Appearance: Normal appearance.   HENT:      Head: Normocephalic and atraumatic.      Nose: Nose normal.      Mouth/Throat:      Mouth: Mucous membranes are moist.   Eyes:      Extraocular Movements: Extraocular movements intact.      Conjunctiva/sclera: Conjunctivae normal.      Pupils: Pupils are equal, round, and reactive to light.   Cardiovascular:      Rate and Rhythm: Normal rate and regular rhythm.      Pulses: Normal pulses.      Heart sounds: Normal heart sounds.   Pulmonary:      Effort: Pulmonary effort is normal.      " Breath sounds: Normal breath sounds.   Abdominal:      General: There is no distension.      Palpations: Abdomen is soft.      Tenderness: There is no abdominal tenderness.   Musculoskeletal:         General: Normal range of motion.      Cervical back: Normal range of motion.   Skin:     General: Skin is warm and dry.      Capillary Refill: Capillary refill takes less than 2 seconds.   Neurological:      General: No focal deficit present.   Psychiatric:         Attention and Perception: She perceives auditory hallucinations. She does not perceive visual hallucinations.         Speech: Speech normal.         Thought Content: Thought content includes suicidal ideation. Thought content includes suicidal plan.                Medications in the Emergency Department:  Medications - No data to display     Labs  Lab Results (last 24 hours)     Procedure Component Value Units Date/Time    CBC & Differential [[id number]]  (Abnormal) Collected: 07/13/22 1804    Specimen: Blood Updated: 07/13/22 1923    Narrative:      The following orders were created for panel order CBC & Differential.  Procedure                               Abnormality         Status                     ---------                               -----------         ------                     CBC Auto Differential[[id number]]        Abnormal            Final result                 Please view results for these tests on the individual orders.    Comprehensive Metabolic Panel [[id number]]  (Abnormal) Collected: 07/13/22 1804    Specimen: Blood Updated: 07/13/22 1947     Glucose 144 mg/dL      BUN 19 mg/dL      Creatinine 0.75 mg/dL      Sodium 142 mmol/L      Potassium 3.8 mmol/L      Chloride 107 mmol/L      CO2 24.8 mmol/L      Calcium 9.4 mg/dL      Total Protein 7.1 g/dL      Albumin 4.20 g/dL      ALT (SGPT) 31 U/L      AST (SGOT) 22 U/L      Alkaline Phosphatase 77 U/L      Total Bilirubin 0.4 mg/dL      Globulin 2.9 gm/dL      A/G Ratio 1.4 g/dL       BUN/Creatinine Ratio 25.3     Anion Gap 10.2 mmol/L      eGFR 112.8 mL/min/1.73      Comment: National Kidney Foundation and American Society of Nephrology (ASN) Task Force recommended calculation based on the Chronic Kidney Disease Epidemiology Collaboration (CKD-EPI) equation refit without adjustment for race.       Narrative:      GFR Normal >60  Chronic Kidney Disease <60  Kidney Failure <15      Acetaminophen Level [[id number]]  (Normal) Collected: 07/13/22 1804    Specimen: Blood Updated: 07/13/22 1947     Acetaminophen <5.0 mcg/mL     Ethanol [[id number]] Collected: 07/13/22 1804    Specimen: Blood Updated: 07/13/22 1947     Ethanol <10 mg/dL      Ethanol % <0.010 %     Narrative:      Ethanol (Plasma)  <10 Essentially Negative    Toxic Concentrations           mg/dL    Flushing, slowing of reflexes    Impaired visual activity         Depression of CNS              >100  Possible Coma                  >300       Salicylate Level [[id number]]  (Normal) Collected: 07/13/22 1804    Specimen: Blood Updated: 07/13/22 1947     Salicylate <0.3 mg/dL     CBC Auto Differential [[id number]]  (Abnormal) Collected: 07/13/22 1804    Specimen: Blood Updated: 07/13/22 1923     WBC 4.97 10*3/mm3      RBC 4.27 10*6/mm3      Hemoglobin 13.3 g/dL      Hematocrit 38.1 %      MCV 89.2 fL      MCH 31.1 pg      MCHC 34.9 g/dL      RDW 14.4 %      RDW-SD 46.5 fl      MPV 10.4 fL      Platelets 202 10*3/mm3      Neutrophil % 55.4 %      Lymphocyte % 31.2 %      Monocyte % 7.8 %      Eosinophil % 3.6 %      Basophil % 0.6 %      Immature Grans % 1.4 %      Neutrophils, Absolute 2.75 10*3/mm3      Lymphocytes, Absolute 1.55 10*3/mm3      Monocytes, Absolute 0.39 10*3/mm3      Eosinophils, Absolute 0.18 10*3/mm3      Basophils, Absolute 0.03 10*3/mm3      Immature Grans, Absolute 0.07 10*3/mm3      nRBC 0.0 /100 WBC     hCG, Quantitative, Pregnancy [[id number]] Collected: 07/13/22 1804    Specimen: Blood Updated: 07/13/22 1952      HCG Quantitative <0.50 mIU/mL     Narrative:      HCG Ranges by Gestational Age    Females - non-pregnant premenopausal   </= 1mIU/mL HCG  Females - postmenopausal               </= 7mIU/mL HCG    3 Weeks       5.4   -      72 mIU/mL  4 Weeks      10.2   -     708 mIU/mL  5 Weeks       217   -   8,245 mIU/mL  6 Weeks       152   -  32,177 mIU/mL  7 Weeks     4,059   - 153,767 mIU/mL  8 Weeks    31,366   - 149,094 mIU/mL  9 Weeks    59,109   - 135,901 mIU/mL  10 Weeks   44,186   - 170,409 mIU/mL  12 Weeks   27,107   - 201,615 mIU/mL  14 Weeks   24,302   -  93,646 mIU/mL  15 Weeks   12,540   -  69,747 mIU/mL  16 Weeks    8,904   -  55,332 mIU/mL  17 Weeks    8,240   -  51,793 mIU/mL  18 Weeks    9,649   -  55,271 mIU/mL    Results may be falsely decreased if patient taking Biotin.      Urine Drug Screen - Urine, Clean Catch [[id number]]  (Normal) Collected: 07/13/22 1908    Specimen: Urine, Clean Catch Updated: 07/13/22 1949     Amphet/Methamphet, Screen Negative     Barbiturates Screen, Urine Negative     Benzodiazepine Screen, Urine Negative     Cocaine Screen, Urine Negative     Opiate Screen Negative     THC, Screen, Urine Negative     Methadone Screen, Urine Negative     Oxycodone Screen, Urine Negative    Narrative:      Negative Thresholds Per Drugs Screened:    Amphetamines                 500 ng/ml  Barbiturates                 200 ng/ml  Benzodiazepines              100 ng/ml  Cocaine                      300 ng/ml  Methadone                    300 ng/ml  Opiates                      300 ng/ml  Oxycodone                    100 ng/ml  THC                           50 ng/ml    The Normal Value for all drugs tested is negative. This report includes final unconfirmed screening results to be used for medical treatment purposes only. Unconfirmed results must not be used for non-medical purposes such as employment or legal testing. Clinical consideration should be applied to any drug of abuse test, particularly  when unconfirmed results are used.            COVID-19,CEPHEID/ILIA,COR/MARIA E/PAD/THIAGO IN-HOUSE(OR EMERGENT/ADD-ON),NP SWAB IN TRANSPORT MEDIA 3-4 HR TAT, RT-PCR - Swab, Nasopharynx [[id number]]  (Abnormal) Collected: 07/13/22 1944    Specimen: Swab from Nasopharynx Updated: 07/13/22 2028     COVID19 Detected    Narrative:      Fact sheet for providers: https://www.fda.gov/media/531766/download     Fact sheet for patients: https://www.fda.gov/media/451589/download  Fact sheet for providers: https://www.fda.gov/media/486610/download     Fact sheet for patients: https://www.fda.gov/media/225103/download           Imaging:  No Radiology Exams Resulted Within Past 24 Hours    Procedures:  Procedures    Progress                            Medical Decision Making:  MDM  Number of Diagnoses or Management Options  Auditory hallucinations  COVID-19  History of substance abuse (HCC)  Suicidal ideation  Diagnosis management comments:     The patient was evaluated by the clinical .  Due to the patient's continued suicidal ideation with a plan and the patient's continued auditory hallucinations she feels the patient should be admitted to the hospital for acute psychiatric stabilization.  The patient has agreed for admission as a voluntary admit.  The patient did test positive for COVID-19.  The patient will thus be admitted to medical floor with psychiatric management.       Amount and/or Complexity of Data Reviewed  Clinical lab tests: reviewed  Tests in the medicine section of CPT®: reviewed  Discuss the patient with other providers: yes (I have discussed the case with Dr. Ramirez who feels the patient should be admitted to the hospital)                 Final diagnoses:   Suicidal ideation   Auditory hallucinations   History of substance abuse (HCC)   COVID-19        Disposition:  ED Disposition     ED Disposition   DC/Transfer to Behavioral Health    Condition   Stable    Comment   --             Documentation  assistance provided by Ramone Esparza acting as scribe for Rajendra Holcomb DO. Information recorded by the scribe was done at my direction and has been verified and validated by me.          Ramone Esparza  07/13/22 2038       Ramone Esparza  07/13/22 2039       Rajendra Holcomb DO  07/17/22 0215

## 2022-07-14 NOTE — SIGNIFICANT NOTE
"   07/13/22 2011   Behavior WDL   Behavior WDL interactions;motor movement;X   Interactions eye contact intermittent   Motor Movement head raised   Emotion Mood WDL   Emotion/Mood/Affect WDL X;affect;emotion mood   Affect flat   Emotion/Mood facial expression incongruent   Speech WDL   Speech WDL WDL   Perceptual State WDL   Perceptual State WDL hallucinations   Hallucinations auditory  (Pt reports hearing voices telling her she raped her kids)   Thought Process WDL   Thought Process WDL delusions   Delusions obsessions   Intellectual Performance WDL   Intellectual Performance WDL WDL   Coping/Stress   Major Change/Loss/Stressor child(sierra)  (Pt reports that children are currently with her mother in law)   Patient Personal Strengths self-reliant   Sources of Support community support  (Pt reports she is currently in rehab)   Techniques to [place] with Loss/Stress/Change substance use  (Pt reports using \"dope\")   Reaction to Health Status fear   C-SSRS (Recent)   Q1 Wished to be Dead (Past Month) yes   Q2 Suicidal Thoughts (Past Month) yes   Q3 Suicidal Thought Method yes   Q4 Suicidal Intent without Specific Plan no   Q5 Suicide Intent with Specific Plan yes   Q6 Suicide Behavior (Lifetime) no   Within the past 3 Months? other (see comments)  (Pt reports prior SI and thoughts this date with plan to slit wrist with a knife)   Level of Risk per Screen high risk   Violence Risk   Feels Like Hurting Others no   Previous Attempt to Harm Others no     SW met with pt at bedside at request of provider. Pt reported numerous times that she hears voices that tell her she raped her kids and that she is \"faking bunny\". SW discussed what \"faking bunny\" meant and pt reports this means you are retarded. Pt appeared to obsessively and repetitively blurt out \"they say I raped my kids but I know I didn't\". Pt reports that her children are 7 & 8 and currently with her mother in law. Pt reports the onset of these voices started " "approximately one month ago when she was in penitentiary for child support. Pt reports that she has been suicidal with plan to slit her wrist as of today. Pt reports that she grew up without a mother and does not want her children to grow up without a mother. Pt reports that she is on medications but stated they are not working. Pt reports she is currently a rehab facility and that she used \"dope\" but it has been several weeks. Pt reports she is willing to go inpatient this date as she knows she needs the help. SW updated provider this date.   "

## 2022-07-14 NOTE — PLAN OF CARE
"Goal Outcome Evaluation:  Plan of Care Reviewed With: patient  Patient Agreement with Plan of Care: agrees         Pt is withdrawn to bed with CWA at bedside.  She is on phone with her grandmother, and states, \"I'm at [facility]. I tried to kill myself again.\"  She denies SI/HI and contracts for safety.  She does endorse auditory hallucinations, but does not specify what she hears.  She rates anxiety and depression 10, and reports hopelessness.  She identifies her grandmother and sister as her support system.  She reports feeling weak from covid, but otherwise negative for additional symptoms.  She is able to make her needs known.  She expresses interested in returning to [facility] to continue her recovery upon discharge.  Will continue to monitor.   "

## 2022-07-14 NOTE — CASE MANAGEMENT/SOCIAL WORK
Informed by RN that pt wishes to return to [facility] at discharge. Clinical information forwarded to intake, Brad at [facility] confirmed that they received that and will follow up with me tomorrow regarding readiness for discharge. Pt can isolate at [facility] once cleared and determined appropriate to return to treatment by clinical staff.

## 2022-07-14 NOTE — PLAN OF CARE
Goal Outcome Evaluation:   Patient very quiet and withdrawn this shift but no complaints. Close watch still at bedside.

## 2022-07-14 NOTE — H&P
"[facility]   PSYCHIATRIC  HISTORY AND PHYSICAL    Patient Name: Anali Das  : 1995  MRN: [id number]  Primary Care Physician:  Provider, No Known  Date of admission: 2022    Subjective   Subjective     Chief Complaint: \"I was going to hurt myself, I was going to kill myself\"    HPI:     Anali Das is a 26 y.o. female admitted on a voluntary basis after being referred from her drug and alcohol rehabilitation facility.  She reports that she had been there for 1 day began expressing suicidal ideations came to the hospital.  Patient reported depression with suicidal ideations.  She also was reporting having hallucinations in the emergency room.  Patient reports that her plan for suicide was to try to find a knife and cut herself.  She reports that she has voices in her head that would not leave her alone.  When asked what kept her from doing a thing to harm herself she reports that she has 2 kids and she wants to live for them.    Patient cannot identify any trigger that made her feel so despondent yesterday that she wanted to end her life.  She reports that a lot of this depression started, and that the voices started when she was in the care home center [facility] a couple months ago.  She was in care home center for nonpayment of child support.  She reports that she was there for 2 months.  She reports prior to being in the care home center she never had suicidal ideations in the past.    She reports that she is depressed.  Reports that she feels hopeless and has nothing going for her and that nothing ever goes right for her.  She reports the voices in her head will not stop bullying her.  She does not appear to be hallucinating or responding to internal stimuli.  She is having internal dialogue.  She reports the voices will say things such as, \"you rape your little girl.\"  She reports that she never did things to harm her children.  She reports that she has been not sleeping " "well.  She reports energy is \"very very low.\"  She reports depressed mood.  She also reports that she feels like they put something in her [facility] senior living center to track her and to be able to read her thoughts.  She has some underlying delusional thinking.    Patient reports that [facility] day for \"dope\" and states that she had been using methamphetamine, Suboxone, marijuana.  She reports rare use of alcohol.  She reports she had her first use of any substance age 12 began using daily \"at about that same age\" cannot tell me what her longest sobriety is.    She continues to deny suicidal ideations today and reports she hopes to get better and that she wants to live for her kids and hopes to be able to regulate her relationship with him.  They were taken with her at a very early age and states that they have been out of her custody for 7 to 8 years and they are 7 and 8 years old.    She is an unreliable historian and very difficult to engage.  She makes very limited eye contact.  She is a little concrete in her thinking.  For numerous questions she answers with \"I do not know\" but there relatively simple answers that she should be able to get the name of.  She is putting forth little effort during interview.  There is no agitation.    Review of Systems:      CONSTITUTIONAL: no fever, no night sweats  HEENT: no blurring of vision, no double vision, no hearing difficulty, no tinnitus, no dysplasia, no epistaxis  LUNGS: no cough, no hemoptysis, no wheeze, no shortness of breath;  CARDIOVASCULAR: no palpitation, no chest pain, no shortness of breath;  GI: no abdominal pain, no nausea, no vomiting, no diarrhea, no constipation;  NAM: no dysuria, no hematuria, no frequency or urgency, no nephrolithiasis;  MUSCULOSKELETAL: no joint pain, no swelling, no stiffness;  ENDOCRINE: no polyuria, no polydipsia, no cold or heat intolerance;  HEMATOLOGY: no easy bruising or bleeding, no history of clotting " "disorder;  DERMATOLOGY: no skin rash, no eczema, no pruritus;  NEUROLOGY: no syncope, no seizures, no numbness or tingling of hands, no numbness or tingling of feet, no paresis;  PSYCHIATRIC: As documented in HPI    Personal History     Past Medical History:   Diagnosis Date   • Brain tumor (HCC)        Past Surgical History:   Procedure Laterality Date   • TONSILLECTOMY         Past Psychiatric History: Reports he began seeing a provider at "[facility]  Reports that she does not have a provider previous to that.    Psychiatric Hospitalizations: She reports 2 previous [facility]  [facility].  She reports she was also [facility] just prior to going [facility] but cannot state why she was [facility] or how she got there.    Suicide Attempts: She denies any history of suicide attempts    Prior Treatment and Medications Tried: His recently has been started on aripiprazole and fluoxetine      Family History: family history is not on file. Otherwise pertinent FHx was reviewed and not pertinent to current issue.    Family Psych History:None known to patient      Family Substance Abuse History:None known to patient      Family Suicide History:None known to patient      Social History: Born and raised in [place].  She is a high school graduate.  Reports she has a court date pending for child support again later this month.  Recently served 2 months term and [facility] for failure to pay child support.  She is  but  from her .  They have been  for a couple years.  She has 2 children ages 7 and 8 that are in the custody of her mother-in-law.  She does not state why she lost custody.  Currently unemployed.  She never been in .  She is not Rastafarian or spiritual.      Abuse history: She responds, \"a lot.\"    Social History     Socioeconomic History   • " Marital status: Single   Tobacco Use   • Smoking status: Current Every Day Smoker     Years: 1.00     Types: Cigarettes   Substance and Sexual Activity   • Alcohol use: Not Currently   • Drug use: Yes     Types: Methamphetamines     Comment: suboxone   • Sexual activity: Defer       Substance Abuse History: reports that she has been smoking cigarettes. She has smoked for the past 1.00 year. She does not have any smokeless tobacco history on file. She reports previous alcohol use. She reports current drug use. Drug: Methamphetamines.    Home Medications:   ARIPiprazole, FLUoxetine, and traZODone      Allergies:  Allergies   Allergen Reactions   • Augmentin [Amoxicillin-Pot Clavulanate] Anaphylaxis and Hives   • Latex Anaphylaxis and Hives   • Phenergan [Promethazine] Anaphylaxis and Hives   • Seroquel [Quetiapine] Anaphylaxis and Hives       Objective   Objective     Vitals:   Temp:  [97.8 °F (36.6 °C)-98.7 °F (37.1 °C)] 98.6 °F (37 °C)  Heart Rate:  [69-99] 77  Resp:  [18-20] 18  BP: ()/(59-67) 98/60    Physical Exam:      CONSTITUTIONAL: Patient is well developed, well nourished, awake and alert.  HEENT: Head and neck are normocephalic and atraumatic. Pupils equal and  round.  Sclerae clear. No icterus.  LUNGS: Even unlabored respirations.  Clear to auscultation, asymptomatic COVID  CARDIAC: Normal rate and rhythm.  ABDOMEN: Nondistended.  SKIN: Clean, dry, intact.  EXTREMITIES: No clubbing, cyanosis, edema.  MUSCULOSKELETAL: Symmetric body habitus. Spine straight. Strength intact,  full range of motion.  NEUROLOGIC: Appropriate. No abnormal movements, good muscle tone.                              Cerebellar: station and gait steady.  Cranial Nerves:  CN II: Visual fields without deficit.  CN III: Pupils symmetric.  CN III, IV, VI:  Extraocular eye muscles intact, no nystagmus.  CN V: Jaw open and closing normal.  CN VII: Frown and smile symmetric.  CN VIII: Hearing intact.  CN IX, X: Palate rise normal;  "phonation without hoarseness.  CN XI: Shoulder shrug equal.  CN XII: Tongue midline, no fasciculations, no dysarthria.    Mental Status Exam:      Awake, alert, difficult to engage and makes poor eye contact.  She gives minimal responses.  Speaks in a monotone appears dejected.  Does not want expound on answers and is very guarded.  She appears appropriate for stated age.  From interview and some of the simplicity of thought she may be slightly below average intelligence but she reports she is a high school graduate.       Hygiene:   good  Cooperation:  Guarded, evasive, superficial  Eye Contact:  Poor  Psychomotor Behavior:  Appropriate  Affect:  Flat  Mood: \"10\" (difficult to get her to explain what she means by this but assume it means very depressed)  Speech:  Minimal, Monotone and Nonspontaneous  Language: Appropriate, relevant  Thought Process:  Goal directed and Guarded, simple  Thought Content:  Normal  Suicidal:  Denies today  Homicidal:  None  Hallucinations:  None  Delusion:  Paranoid  Memory:  Intact  Orientation:  Person, Place, Time and Situation  Reliability:  fair  Insight:  Poor  Judgement:  Fair  Impulse Control:  Fair        Result Review    Result Review:  I have personally reviewed the results from the time of this admission to 7/14/2022 11:41 EDT and agree with these findings:  [x]  Laboratory  []  Microbiology  []  Radiology  []  EKG/Telemetry   []  Cardiology/Vascular   []  Pathology  []  Old records  []  Other:  Most notable findings include: No pertinent negative or positives    Assessment & Plan   Assessment / Plan     Brief Patient Summary:  Anali Das is a 26 y.o. female who admitted on a voluntary basis for depression with suicidal ideations.  She also reports hearing voices and has some delusional thinking about having a device implanted at [facility]    Active Hospital Problems:  Active Hospital Problems    Diagnosis    • COVID-19 virus detected    • Depression  "       Plan:   Reinitiate fluoxetine as she is only been on it for a short time may titrate up the dose during her stay  Continue aripiprazole she is not been on this medicine long  Admit for safety and stabilization and begin treatment for underlying mood disorder or psychosis with appropriate medications  Attempt to gain collateral information of possible  Work on safety plan  Provide supportive therapy  Patient to engage in all group and individual treatment modalities available including milieu therapy  Work on appropriate disposition follow-up  Estimated length of stay in hospital 4 to 5 days      DVT prophylaxis:  Mechanical DVT prophylaxis orders are present.    CODE STATUS:    Code Status (Patient has no pulse and is not breathing): CPR (Attempt to Resuscitate)  Medical Interventions (Patient has pulse or is breathing): Full Support      Admission Status:  I believe this patient meets inpatient status.    Electronically signed by Shawn Alejo MD, 07/14/22, 11:41 AM EDT.

## 2022-07-15 RX ORDER — ARIPIPRAZOLE 10 MG/1
10 TABLET ORAL DAILY
Status: DISCONTINUED | OUTPATIENT
Start: 2022-07-16 | End: 2022-07-18 | Stop reason: HOSPADM

## 2022-07-15 RX ADMIN — LORAZEPAM 2 MG: 2 TABLET ORAL at 09:42

## 2022-07-15 RX ADMIN — FLUOXETINE 40 MG: 20 CAPSULE ORAL at 09:04

## 2022-07-15 RX ADMIN — NICOTINE 1 PATCH: 21 PATCH, EXTENDED RELEASE TRANSDERMAL at 09:05

## 2022-07-15 RX ADMIN — ARIPIPRAZOLE 5 MG: 5 TABLET ORAL at 09:04

## 2022-07-15 RX ADMIN — HALOPERIDOL 5 MG: 5 TABLET ORAL at 19:58

## 2022-07-15 NOTE — CASE MANAGEMENT/SOCIAL WORK
Spoke with intake at [facility]. Update forwarded via fax. Pt needs to complete phone screening. She has been clinically approved to return to treatment at [facility] pending the completion of her phone screening. [facility] indicated that pt may be able to transition back to treatment this weekend once screening was completed and pt was discharged by the physician.

## 2022-07-15 NOTE — PLAN OF CARE
"Goal Outcome Evaluation:    Patient rested and slept most of shift. Rated anxiety 10/10 PO Ativan administered per order. Pt states \" I don't want to molest my kids, but these voices in my head are telling me to do things that I know are wrong\". VSS at this time. Close watch assist in room with patient. Suicide precautions remain in place.   "

## 2022-07-15 NOTE — PLAN OF CARE
Goal Outcome Evaluation:    Problem: Suicide Risk  Goal: Absence of Self-Harm  Outcome: Ongoing, Not Progressing  Intervention: Establish Safety Plan and Continuity of Care  Recent Flowsheet Documentation  Taken 7/15/2022 0915 by Aleyda Begum RN  Safe Transition Promotion: protective factors promoted  Intervention: Promote Psychosocial Wellbeing  Recent Flowsheet Documentation  Taken 7/15/2022 0915 by Aleyda Begum RN  Supportive Measures: active listening utilized  Family/Support System Care: support provided  Intervention: Assess Risk to Self and Maintain Safety  Recent Flowsheet Documentation  Taken 7/15/2022 0915 by Aleyda Begum RN  Behavior Management: behavioral plan reviewed  Self-Harm Prevention:   environmental self-harm risks assessed   environment modified for self-harm risk   observed one-to-one

## 2022-07-15 NOTE — NURSING NOTE
Patient alert and oriented. Reports feeling tired and sleeping more than usual. Denies SI and HI. Reports hearing voices earlier today saying she had raped someone. Patient states this upsets her because she would never do that. Rates anxiety and depression 10.

## 2022-07-15 NOTE — PROGRESS NOTES
" [facility]     Psychiatric Progress Note    Patient Name: Anali Das  : 1995  MRN: [id number]  Primary Care Physician:  Provider, No Known  Date of admission: 2022    Subjective   Subjective     Chief Complaint: Depression, psychosis    HPI:     Patient seen and chart reviewed, discussed with staff.    Staff reports the patient was somewhat difficult to engage.  Continues reported depression and anxiety both being at a 9.  She did talk to her grandmother by telephone yesterday.  Continues to voice that she wants to go to drug and alcohol rehabilitation at discharge.    Patient day continues to report having auditory hallucinations.  She is noted to be up and eating.  Does complain of being drowsy has been sleeping a lot.  She has been off of substances for 2 to 3 weeks.  Patient reports that she has lots of voices that are inside of her head there is stating she raped people but she is very quick to state that she has never raped anyone and would never do such a thing.  She is calm and cooperative.  She is more engaging cooperative than she was yesterday.  Has an improved affect.  She reports hallucinations have decreased.    Patient reports voices in her head are better.  When asked mother better she states, \"medications I am taking now like the Ativan and stuff.\"  Review of chart shows the patient received 3 doses of lorazepam from the agitation protocol and did not receive the haloperidol or diphenhydramine.  It appears these medicines were given because the patient stated she was feeling anxious and not for the indication combativeness and extreme agitation.  We will discontinue lorazepam from this protocol.  Patient has a long history of substance abuse seeking medications for pleasure and probably overstating symptoms.    No signs and symptoms of COVID      Objective   Objective     Vitals:   Temp:  [97.9 °F (36.6 °C)-98.6 °F (37 °C)] 98 °F (36.7 °C)  Heart Rate:  [77-90] 90  Resp:  " "[16-18] 16  BP: (95-99)/(57-59) 96/57    Afebrile, even and unlabored respirations      Mental Status Exam:      Appearance:   Well-developed, well-nourished, calm, cooperative, engaging, makes good eye contact, participates in exam  Reliability:   Fair  Eye Contact:   Good  Concentration/Focus:    Attentive  Behaviors:    No restlessness or agitation  Memory :    Sensorium intact  Speech:    Normal rate and volume, spontaneous  Language:   Appropriate, relevant  Mood :    \"Bleak\"  Affect:    Constricted but brighter than yesterday  Thought process:    Goal directed, linear,  Thought Content:    Denies suicidal or homicidal ideation, reports hallucinations inside her head suspect its internal dialogue  Insight:   Fair  Judgement:    Fair      Result Review    Result Review:  I have personally reviewed the results from the time of this admission to 7/15/2022 13:44 EDT and agree with these findings:  []  Laboratory  []  Microbiology  []  Radiology  []  EKG/Telemetry   []  Cardiology/Vascular   []  Pathology  []  Old records  []  Other:  Most notable findings include:     Medications:   [START ON 7/16/2022] ARIPiprazole, 10 mg, Oral, Daily  FLUoxetine, 40 mg, Oral, Daily  nicotine, 1 patch, Transdermal, Q24H          Assessment / Plan       Active Hospital Problems:  Active Hospital Problems    Diagnosis    • COVID-19 virus detected    • Major depressive disorder, recurrent episode, severe, with psychosis (HCC)        Plan:     Remove lorazepam from the agitation protocol to avoid the patient med seeking and attempting to use medications other than intended, including getting a buzz.  Work on mood stabilization and abatement of any suicidal ideation or psychosis.  Work on appropriate safety plan  Continue supportive therapy  Patient to engage in all group and individual treatment modalities available on the unit  Obtain collateral information if possible  Titrate medications as clinically indicated  Work on appropriate " disposition follow-up including referrals to substance abuse treatment if indicated      Disposition:  I expect patient to be discharged to [facility] when stable.    Part of this note may be an electronic transcription/translation of spoken language to printed text using the Dragon dictation system.  Every attempt has been made to correct errors but some transcription errors may be present in the body of the note.       Electronically signed by Shawn Alejo MD, 07/15/22, 1:44 PM EDT.

## 2022-07-15 NOTE — NURSING NOTE
"Pt resting abed in room with close watch staff monitoring . Pt denies suicidal and homicidal ideations, states she isn't currently heairng \"voices\" but states earlier today she was hearing the voices saying she raped her daugher then saying she didn't. States she thinks this is from where she \"fake rui\", when ask to clarify this statement she stated \"that is from when I acted retarded\".Pt rated depression 9/10,anxiety a 9/10 and hopelessness a 2/10. Treatment plan reviewed and is ongoing.  "

## 2022-07-15 NOTE — PLAN OF CARE
Goal Outcome Evaluation:           Progress: improving  Outcome Evaluation: patient resting in bed, pt eating icecream, no hallucinations, no c/o pain or discomfort. call light within reach, close watch present in room.

## 2022-07-16 RX ADMIN — NICOTINE 1 PATCH: 21 PATCH, EXTENDED RELEASE TRANSDERMAL at 08:41

## 2022-07-16 RX ADMIN — ARIPIPRAZOLE 10 MG: 10 TABLET ORAL at 08:40

## 2022-07-16 RX ADMIN — HYDROXYZINE PAMOATE 50 MG: 50 CAPSULE ORAL at 21:40

## 2022-07-16 RX ADMIN — FLUOXETINE 40 MG: 20 CAPSULE ORAL at 08:40

## 2022-07-16 NOTE — PLAN OF CARE
Goal Outcome Evaluation:      Problem: Suicide Risk  Goal: Absence of Self-Harm  Outcome: Ongoing, Not Progressing  Intervention: Establish Safety Plan and Continuity of Care  Recent Flowsheet Documentation  Taken 7/16/2022 0905 by Aleyda Begum RN  Safe Transition Promotion: protective factors promoted  Intervention: Promote Psychosocial Wellbeing  Recent Flowsheet Documentation  Taken 7/16/2022 0905 by Aleyda Begum RN  Supportive Measures: active listening utilized  Family/Support System Care: support provided  Sleep/Rest Enhancement: awakenings minimized  Intervention: Assess Risk to Self and Maintain Safety  Recent Flowsheet Documentation  Taken 7/16/2022 0905 by Aleyda Begum RN  Behavior Management: behavioral plan reviewed  Self-Harm Prevention:   environmental self-harm risks assessed   observed one-to-one   environment modified for self-harm risk

## 2022-07-16 NOTE — PLAN OF CARE
Goal Outcome Evaluation:           Progress: no change  Outcome Evaluation: STABLE WITH CLOSE WATCH ATTENDANT AT BEDSIDE. PT DENIES SUICIDAL IDEATION. STATES HAVING AUDITORY HALLUCINATION. NO OTHER CHANGE DURING SHIFT.

## 2022-07-16 NOTE — NURSING NOTE
Upon entering room, pt laying in bed watching t.v. with CWA at bedside. Pt pleasant upon approach, calm and cooperative with assessment. Pt stated that the voices have shifted from telling her she has been raping her kids to telling her she is a good mother and will get her kids back on the 25th. Pt denies SI, HI. Rates depression and anxiety 10/10. Spoke to assigned nurse on unit, Marcos, and updated her on status of patient. Pt will cont to be monitored for safety and mood. CWA remains at bedside.

## 2022-07-16 NOTE — NURSING NOTE
Patient asleep upon entering room. Awakens easily. Calm and cooperative with assessment. Patient denies SI today. Denies HI. Reports auditory hallucinations, voices telling her she has raped her daughter, Melissa. Rates depression and anxiety 10. Patient provided with phone number to [facility] yesterday to complete phone screening. Patient states she has not called them yet. Encouraged patient to call today. CWA at bedside to monitor for safety.

## 2022-07-16 NOTE — NURSING NOTE
"Patient slept most of day. States she is still hearing voices that are telling her to \" molest her children, but knows that is wrong\". VSS. Pt states anxiety and depression is 10/10, but visible is not showing s/s at this time.   "

## 2022-07-17 RX ADMIN — HYDROXYZINE PAMOATE 50 MG: 50 CAPSULE ORAL at 21:20

## 2022-07-17 RX ADMIN — ARIPIPRAZOLE 10 MG: 10 TABLET ORAL at 08:00

## 2022-07-17 RX ADMIN — NICOTINE 1 PATCH: 21 PATCH, EXTENDED RELEASE TRANSDERMAL at 08:00

## 2022-07-17 RX ADMIN — FLUOXETINE 40 MG: 20 CAPSULE ORAL at 08:00

## 2022-07-17 RX ADMIN — HYDROXYZINE PAMOATE 50 MG: 50 CAPSULE ORAL at 13:59

## 2022-07-17 NOTE — PLAN OF CARE
"Goal Outcome Evaluation:  Plan of Care Reviewed With: patient  Patient Agreement with Plan of Care: agrees         Pt is withdrawn to bed sleeping.  She is sleepy during assessment.  She reports AH, but can't remember what voices are saying.  She states, \"I can't remember,\" to much of the interview. She denies SI/HI and contracts for safety.  She reports that she has spoken with [facility] but cannot remember what they said.  She rates anxiety, depression 8.  Pt rolls over in bed and turns back to RN during assessment.  She is able to make her needs known.  Will continue to monitor.   "

## 2022-07-17 NOTE — NURSING NOTE
"Upon entering pt. Room, pt laying in bed on right side facing door. Pt sat up quickly and told this nurse she was hearing voices. Pt reported the voices telling her to rape her kids. Pt stated the voices are saying, \"Rub the kids on your pussy.\" Pt repeated \"I would never do that to my babies.\" Pt denies SI, HI. Rates depression and anxiety 10/10. Spoke with assigned nurse on unit updated on status. Reviewed medications, followed up with assigned nurse and recommended administration of Vistaril medication ordered PRN for anxiety. CWA at bedside.   "

## 2022-07-17 NOTE — PROGRESS NOTES
Psychiatry Progress Note    7/17/2022    Legal Status: voluntary    Chief Complaint: doing better    Subjective:    The patient is a 26-year-old  female was admitted on voluntary status, the patient was admitted to medicine floor due to being COVID-positive.  The patient was seen, discussed with nursing staff.  The patient admitted feeling better, stated that voices were still present but she wasn't able to understand what they were saying; pt stated that she called drug rehabilitation program and was told that she has bed and could come back; the patient still ranked her depression and anxiety as 8, however denied problem with sleep or appetite.The patient denied side effect from current medication regiment.      Vital Signs    Vitals:    07/15/22 2031 07/16/22 0751 07/16/22 1958 07/17/22 0800   BP: 105/58 144/89 104/62 92/61   BP Location: Right arm Right arm Right arm Right arm   Patient Position: Lying Lying Sitting Sitting   Pulse: 81 100 72 71   Resp: 16 16 18 18   Temp: 97.6 °F (36.4 °C) 99.6 °F (37.6 °C) 98.7 °F (37.1 °C) 98.1 °F (36.7 °C)   TempSrc: Oral Oral Oral Oral   SpO2: 99% 98% 100% 98%   Weight:       Height:           Mental Status Exam:   26-year-old  female who appeared his stated age, was laying comfortably in the bed.  The patient was fairly groomed, cooperative, kept fair eye contact.  Patient was oriented in all 3 spheres.  Speech was normal tone and rhythm.  The patient described her mood is depressed and anxious, affect was in full range.  Thought process was linear.  Thought content: Patient denied having suicidal or homicidal ideations, reported hearing voices but wasn't able to understand what they were saying;the patient didn't appear to be internally preoccupied, didn't display any delay responses.  Memory was intact.  Intelligence: Average.  Insight and judgment impaired.    Lab Results: Results source: EMR   Lab Results (last 24 hours)     ** No results found for  the last 24 hours. **          Radiology Results:  Imaging Results (Last 24 Hours)     ** No results found for the last 24 hours. **          Medicine:   Current Facility-Administered Medications:   •  acetaminophen (TYLENOL) tablet 650 mg, 650 mg, Oral, Q4H PRN, Vivian Hebert MD  •  aluminum-magnesium hydroxide-simethicone (MAALOX MAX) 400-400-40 MG/5ML suspension 15 mL, 15 mL, Oral, Q6H PRN, Vivian Hebert MD  •  ARIPiprazole (ABILIFY) tablet 10 mg, 10 mg, Oral, Daily, Shawn Alejo MD, 10 mg at 07/17/22 0800  •  haloperidol (HALDOL) tablet 5 mg, 5 mg, Oral, Q4H PRN, 5 mg at 07/15/22 1958 **AND** [DISCONTINUED] LORazepam (ATIVAN) tablet 2 mg, 2 mg, Oral, Q4H PRN, 2 mg at 07/15/22 0942 **AND** diphenhydrAMINE (BENADRYL) capsule 50 mg, 50 mg, Oral, Q4H PRN, Vivian Hebert MD  •  haloperidol lactate (HALDOL) injection 5 mg, 5 mg, Intramuscular, Q4H PRN **AND** [DISCONTINUED] LORazepam (ATIVAN) injection 2 mg, 2 mg, Intramuscular, Q4H PRN **AND** diphenhydrAMINE (BENADRYL) injection 50 mg, 50 mg, Intramuscular, Q4H PRN, Vivian Hebert MD  •  FLUoxetine (PROzac) capsule 40 mg, 40 mg, Oral, Daily, Shawn Alejo MD, 40 mg at 07/17/22 0800  •  hydrOXYzine pamoate (VISTARIL) capsule 50 mg, 50 mg, Oral, Q6H PRN, Vivian Hebert MD, 50 mg at 07/16/22 2140  •  loperamide (IMODIUM) capsule 2 mg, 2 mg, Oral, Q2H PRN, Vivian Hebert MD  •  magnesium hydroxide (MILK OF MAGNESIA) suspension 10 mL, 10 mL, Oral, Daily PRN, Vivian Hebert MD  •  nicotine (NICODERM CQ) 21 MG/24HR patch 1 patch, 1 patch, Transdermal, Q24H, Vivian Hebert MD, 1 patch at 07/17/22 0800  •  traZODone (DESYREL) tablet 100 mg, 100 mg, Oral, Nightly PRN, Vivian Hebert MD    Diagnoses/Assessment:     Major depressive disorder, recurrent episode, severe, with psychosis (HCC)    COVID-19 virus detected      Treatment Plan:    1) Will continue care at [facility] to ensure patient safety; pt is on medical  floor due to be Covid+  2) Supportive approach, continue current medication regiment  3) The patient contacted [facility] and was told that she could come  4) Work on appropriate discharge plan, the patient was agreeable to return to CD program.    Projected length of stay :1-2 days      Treatment plan and medication risks and benefits discussed with: Patient    Vivian Hebert MD  07/17/22 at 13:23 EDT

## 2022-07-17 NOTE — NURSING NOTE
"Pt rated anxiety 10/10 a few times during shift and prn medication given for that. Pt states \" I still am hearing voices\". VSS. Close watch remains in room with pt.   "
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
## 2022-07-17 NOTE — PLAN OF CARE
Goal Outcome Evaluation:           Progress: no change  Outcome Evaluation: STABLE. CLOSE WATCH ATTENDANT AT BEDSIDE. PT DENIES SUICIDAL IDEATION. PT STATES HAVING AUDITORY HALLUCINATION. ANXIOUS DURING NIGHT DUE TO HEARING VOICES. NO OTHER CHANGES AT THIS TIME.

## 2022-07-18 ENCOUNTER — READMISSION MANAGEMENT (OUTPATIENT)
Dept: CALL CENTER | Facility: HOSPITAL | Age: 27
End: 2022-07-18

## 2022-07-18 VITALS
WEIGHT: 171.52 LBS | DIASTOLIC BLOOD PRESSURE: 63 MMHG | OXYGEN SATURATION: 100 % | HEART RATE: 70 BPM | SYSTOLIC BLOOD PRESSURE: 120 MMHG | BODY MASS INDEX: 29.28 KG/M2 | RESPIRATION RATE: 18 BRPM | TEMPERATURE: 97.9 F | HEIGHT: 64 IN

## 2022-07-18 PROBLEM — F33.1 MAJOR DEPRESSIVE DISORDER, RECURRENT EPISODE, MODERATE: Status: ACTIVE | Noted: 2022-07-18

## 2022-07-18 PROCEDURE — 25010000002 HALOPERIDOL LACTATE PER 5 MG: Performed by: PSYCHIATRY & NEUROLOGY

## 2022-07-18 RX ORDER — ARIPIPRAZOLE 10 MG/1
10 TABLET ORAL DAILY
Qty: 30 TABLET | Refills: 0 | Status: ON HOLD | OUTPATIENT
Start: 2022-07-19 | End: 2022-09-08

## 2022-07-18 RX ORDER — HYDROXYZINE PAMOATE 25 MG/1
25 CAPSULE ORAL 3 TIMES DAILY PRN
Qty: 30 CAPSULE | Refills: 0 | Status: ON HOLD | OUTPATIENT
Start: 2022-07-18 | End: 2022-09-13 | Stop reason: SDUPTHER

## 2022-07-18 RX ORDER — FLUOXETINE HYDROCHLORIDE 40 MG/1
40 CAPSULE ORAL DAILY
Qty: 30 CAPSULE | Refills: 0 | Status: ON HOLD | OUTPATIENT
Start: 2022-07-18 | End: 2022-09-13 | Stop reason: SDUPTHER

## 2022-07-18 RX ADMIN — HALOPERIDOL LACTATE 5 MG: 5 INJECTION, SOLUTION INTRAMUSCULAR at 11:41

## 2022-07-18 RX ADMIN — ARIPIPRAZOLE 10 MG: 10 TABLET ORAL at 08:46

## 2022-07-18 RX ADMIN — HYDROXYZINE PAMOATE 50 MG: 50 CAPSULE ORAL at 08:46

## 2022-07-18 RX ADMIN — FLUOXETINE 40 MG: 20 CAPSULE ORAL at 08:47

## 2022-07-18 RX ADMIN — NICOTINE 1 PATCH: 21 PATCH, EXTENDED RELEASE TRANSDERMAL at 08:45

## 2022-07-18 NOTE — DISCHARGE SUMMARY
"               [facility]         DISCHARGE SUMMARY    Patient Name: Anali Das  : 1995  MRN: [id number]    Date of Admission: 2022  Date of Discharge: 2022  Primary Care Physician: Provider, No Known    Consults     No orders found from 6/15/2022 to 7/15/2022.          Presenting Problem:   Depression [F32.A]  Major depressive disorder, recurrent episode, moderate (HCC) [F33.1]    Active and Resolved Hospital Problems:  Active Hospital Problems    Diagnosis POA   • COVID-19 virus detected [U07.1] Yes   • Major depressive disorder, recurrent episode, severe, with psychosis (HCC) [F33.3] Yes      Resolved Hospital Problems   No resolved problems to display.         Hospital Course     Hospital Course:  Anali Das is a 26 y.o. female admitted on a voluntary basis from the emergency room [facility] secondary to depression with suicidal ideations.  She also reported hallucinations which reported to be more internal dialogue.  There were concerns that the hallucinations told her that she had not harmed her children.      Patient is quite adamant on days that I met with her that she is not doing to harm her children she would never do anything to harm her children.  He is reported at times that the voices were telling her to harm her children, at other times it was reported that the voices told her she had harmed her children.  She never indicated any harm to children with me but that they were stating that she would harm her children.  She is quite adamant that she would never do anything to harm her kids.  She also does not have custody of her children and cannot tell me the last time she seen him except for that \"it has been a while.\"    On admission she reported being on fluoxetine 40 mg daily and that that was a new medication and she had not been on the 40 mg for a long period this medicine was continued.  She did not recall being on aripiprazole and " aripiprazole was started at 5 mg to help with her depression as well as secondary to her reported hallucinations.  She tolerates medication well over the course of the hospital stay was increased to 10 mg.    Early in the hospital stay the patient was reporting lots of anxiety given a couple doses of lorazepam however this was quickly discontinued from her medication profile.  She was continued on hydroxyzine on an as-needed basis for anxiety and she received a total of 4 doses over the last 3 days.  She received 1 dose early morning yesterday and has been more than 24 hours since her last dose of hydroxyzine.  She continues to have some reported anxiety and will be continued at discharge.    Patient has been calm and cooperative throughout her stay.  There is been no agitation or combativeness.  She did not require any medicines for acute agitation.  Patient's mood and affect have improved.  She initially was very difficult to engage somewhat but gave very minimal answers.  She is more engaged at this time.  She has been sleeping a lot throughout the day, as well as night.  There is no acute psychosis noted and she has appropriate behavior and reactions to staff.  Patient is point, cooperative.  She is free of suicidal ideations.  [facility] to continue to work on sobriety and will discharge today        DISCHARGE Follow Up Recommendations for labs and diagnostics: Lipid and glucose monitoring, routine labs from primary care, community mental health, drug and alcohol rehabilitation      Day of Discharge     Vital Signs:  Temp:  [97.9 °F (36.6 °C)-98.6 °F (37 °C)] 97.9 °F (36.6 °C)  Heart Rate:  [70-74] 70  Resp:  [18] 18  BP: (103-120)/(63-67) 120/63      Pertinent  and/or Most Recent Results     LAB RESULTS:      Lab 07/13/22  1804   WBC 4.97   HEMOGLOBIN 13.3   HEMATOCRIT 38.1   PLATELETS 202   NEUTROS ABS 2.75   IMMATURE GRANS (ABS) 0.07*   LYMPHS ABS 1.55   MONOS ABS 0.39   EOS  ABS 0.18   MCV 89.2         Lab 07/13/22  1804   SODIUM 142   POTASSIUM 3.8   CHLORIDE 107   CO2 24.8   ANION GAP 10.2   BUN 19   CREATININE 0.75   EGFR 112.8   GLUCOSE 144*   CALCIUM 9.4         Lab 07/13/22  1804   TOTAL PROTEIN 7.1   ALBUMIN 4.20   GLOBULIN 2.9   ALT (SGPT) 31   AST (SGOT) 22   BILIRUBIN 0.4   ALK PHOS 77                                 Lab 07/13/22  1944   COVID19 Detected*         Lab 07/13/22  1804   ETHANOL PCT <0.010   ETHANOL MGDL <10         Lab 07/13/22  1908   AMPH/METHAM SCREEN, URINE Negative   BENZODIAZEPINE SCREEN, URINE Negative   COCAINE SCREEN, URINE Negative   OPIATES Negative   THC URINE SCREEN Negative   METHADONE SCREEN, URINE Negative     Brief Urine Lab Results  (Last result in the past 365 days)      Color   Clarity   Blood   Leuk Est   Nitrite   Protein   CREAT   Urine HCG        07/04/22 1347 Dark Yellow   Cloudy     Trace                          CT Head With & Without Contrast    Result Date: 6/25/2022  Impression: 2.3 cm intraparenchymal lesion right head of the caudate and along the anterior limb of the internal capsule. Hyperdensity precontrast may represent a small amount of hemorrhage or dystrophic calcification. Mild enhancement. This would be consistent with primary brain tumor possibly glioma. Evaluation somewhat limited due to lack of prior studies but no significant mass effect or shift. Small right mastoid effusion. Signer Name: RAPHAEL Seo MD  Signed: 6/25/2022 8:33 PM  Workstation Name: [id number]  [facility]    MRI Brain With & Without Contrast    Result Date: 6/25/2022  Impression: Intraparenchymal mass lesion right head of the caudate and anterior limb of the internal capsule measuring up to 2.5 cm. No significant mass effect or surrounding edema. Peripheral blooming suggest prior hemorrhage with hemosiderin. Primary neoplasm such as glioma is favored. Correlate with reported known history of brain tumor. Signer Name: RAPHAEL  Ed Seo MD  Signed: 6/25/2022 10:47 PM  Workstation Name: [facility]  [facility]                  Imaging Results (Last 7 Days)     ** No results found for the last 168 hours. **           Labs Pending at Discharge:           Discharge Details        Discharge Medications      Changes to Medications      Instructions Start Date   ARIPiprazole 10 MG tablet  Commonly known as: ABILIFY  What changed:   medication strength  how much to take   10 mg, Oral, Daily   Start Date: July 19, 2022     hydrOXYzine pamoate 25 MG capsule  Commonly known as: VISTARIL  What changed: reasons to take this   25 mg, Oral, 3 Times Daily PRN         Continue These Medications      Instructions Start Date   FLUoxetine 40 MG capsule  Commonly known as: PROzac   40 mg, Oral, Daily         Stop These Medications    cyclobenzaprine 5 MG tablet  Commonly known as: FLEXERIL     doxepin 10 MG capsule  Commonly known as: SINEquan     ibuprofen 400 MG tablet  Commonly known as: ADVIL,MOTRIN     Multivitamin Adult tablet tablet     risperiDONE 1 MG tablet  Commonly known as: risperDAL            Allergies   Allergen Reactions   • Augmentin [Amoxicillin-Pot Clavulanate] Anaphylaxis and Hives   • Latex Anaphylaxis and Hives   • Phenergan [Promethazine] Anaphylaxis and Hives   • Seroquel [Quetiapine] Anaphylaxis and Hives         Discharge Disposition:  Home or Self Care    Diet:  Hospital:  Diet Order   Procedures   • Diet Regular         Discharge Activity:   Activity Instructions     Activity as Tolerated            Discharge Condition: Stable    CODE STATUS:  Code Status and Medical Interventions:   Ordered at: 07/14/22 1139     Code Status (Patient has no pulse and is not breathing):    CPR (Attempt to Resuscitate)     Medical Interventions (Patient has pulse or is breathing):    Full Support         No future appointments.    Additional Instructions for the Follow-ups that You Need to Schedule     Discharge  Follow-up with PCP   As directed       Currently Documented PCP:    Provider, No Known    PCP Phone Number:    [phone]     Follow Up Details: As needed         Discharge Follow-up with Specified Provider: Indira; 2 Weeks   As directed      To: [facility]    Follow Up: 2 Weeks         Discharge Follow-up with Specified Provider: [facility]; Today   As directed      To: [facility]    Follow Up: Today               Time spent on Discharge including face to face service: 40 minutes    Part of this note may be an electronic transcription/translation of spoken language to printed text using the Dragon dictation system.  Every attempt has been made to correct errors but some transcription errors may be present in the body of the note.    Electronically signed by Shawn Alejo MD, 07/18/22, 11:12 AM EDT.
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
## 2022-07-18 NOTE — CASE MANAGEMENT/SOCIAL WORK
I spoke with patient this morning by phone, reviewed her current status and progress with treatment. Discussed pt's readiness to return to treatment at [facility] today. Instructed pt that she needed to call the access center and update her screening. Contact made with [facility], they have a bed available for patient today and will transport her at discharge. They will pick her up between 2-2:30pm today.

## 2022-07-18 NOTE — NURSING NOTE
"Attempted to call report to [facility] x7 times, no success. Attempted to notify emergency contact \"Lucretia\" on patient fille of patient discharge. No success, no voicemail box available.    "
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
## 2022-07-19 ENCOUNTER — APPOINTMENT (OUTPATIENT)
Dept: CT IMAGING | Facility: HOSPITAL | Age: 27
End: 2022-07-19

## 2022-07-19 ENCOUNTER — HOSPITAL ENCOUNTER (EMERGENCY)
Facility: HOSPITAL | Age: 27
Discharge: HOME | End: 2022-07-19
Attending: EMERGENCY MEDICINE | Admitting: EMERGENCY MEDICINE

## 2022-07-19 ENCOUNTER — APPOINTMENT (OUTPATIENT)
Dept: GENERAL RADIOLOGY | Facility: HOSPITAL | Age: 27
End: 2022-07-19

## 2022-07-19 VITALS
WEIGHT: 163 LBS | TEMPERATURE: 98 F | HEIGHT: 64 IN | HEART RATE: 83 BPM | DIASTOLIC BLOOD PRESSURE: 72 MMHG | SYSTOLIC BLOOD PRESSURE: 105 MMHG | BODY MASS INDEX: 27.83 KG/M2 | RESPIRATION RATE: 18 BRPM | OXYGEN SATURATION: 96 %

## 2022-07-19 VITALS
WEIGHT: 167.11 LBS | TEMPERATURE: 98.6 F | BODY MASS INDEX: 28.53 KG/M2 | DIASTOLIC BLOOD PRESSURE: 70 MMHG | SYSTOLIC BLOOD PRESSURE: 113 MMHG | HEIGHT: 64 IN | HEART RATE: 101 BPM | RESPIRATION RATE: 20 BRPM | OXYGEN SATURATION: 98 %

## 2022-07-19 DIAGNOSIS — R44.3 HALLUCINATIONS: Primary | ICD-10-CM

## 2022-07-19 DIAGNOSIS — F19.11 HX OF SUBSTANCE ABUSE: ICD-10-CM

## 2022-07-19 DIAGNOSIS — F29 CHRONIC PSYCHOSIS: Primary | ICD-10-CM

## 2022-07-19 LAB
ALBUMIN SERPL-MCNC: 4.4 G/DL (ref 3.5–5.2)
ALBUMIN SERPL-MCNC: 5.4 G/DL (ref 3.5–5.2)
ALBUMIN/GLOB SERPL: 1.5 G/DL
ALBUMIN/GLOB SERPL: 1.5 G/DL
ALP SERPL-CCNC: 104 U/L (ref 39–117)
ALP SERPL-CCNC: 95 U/L (ref 39–117)
ALT SERPL W P-5'-P-CCNC: 44 U/L (ref 1–33)
ALT SERPL W P-5'-P-CCNC: 56 U/L (ref 1–33)
AMPHET+METHAMPHET UR QL: NEGATIVE
AMPHET+METHAMPHET UR QL: NEGATIVE
ANION GAP SERPL CALCULATED.3IONS-SCNC: 13.4 MMOL/L (ref 5–15)
ANION GAP SERPL CALCULATED.3IONS-SCNC: 25.5 MMOL/L (ref 5–15)
APAP SERPL-MCNC: <5 MCG/ML (ref 0–30)
APAP SERPL-MCNC: <5 MCG/ML (ref 0–30)
AST SERPL-CCNC: 34 U/L (ref 1–32)
AST SERPL-CCNC: 47 U/L (ref 1–32)
BACTERIA UR QL AUTO: ABNORMAL /HPF
BARBITURATES UR QL SCN: NEGATIVE
BARBITURATES UR QL SCN: NEGATIVE
BASOPHILS # BLD AUTO: 0.06 10*3/MM3 (ref 0–0.2)
BASOPHILS # BLD AUTO: 0.12 10*3/MM3 (ref 0–0.2)
BASOPHILS NFR BLD AUTO: 0.6 % (ref 0–1.5)
BASOPHILS NFR BLD AUTO: 0.7 % (ref 0–1.5)
BENZODIAZ UR QL SCN: NEGATIVE
BENZODIAZ UR QL SCN: NEGATIVE
BILIRUB SERPL-MCNC: 0.4 MG/DL (ref 0–1.2)
BILIRUB SERPL-MCNC: 0.4 MG/DL (ref 0–1.2)
BILIRUB UR QL STRIP: NEGATIVE
BUN SERPL-MCNC: 13 MG/DL (ref 6–20)
BUN SERPL-MCNC: 17 MG/DL (ref 6–20)
BUN/CREAT SERPL: 14 (ref 7–25)
BUN/CREAT SERPL: 16.8 (ref 7–25)
CALCIUM SPEC-SCNC: 10.4 MG/DL (ref 8.6–10.5)
CALCIUM SPEC-SCNC: 9.4 MG/DL (ref 8.6–10.5)
CANNABINOIDS SERPL QL: NEGATIVE
CANNABINOIDS SERPL QL: NEGATIVE
CHLORIDE SERPL-SCNC: 101 MMOL/L (ref 98–107)
CHLORIDE SERPL-SCNC: 103 MMOL/L (ref 98–107)
CLARITY UR: CLEAR
CO2 SERPL-SCNC: 14.5 MMOL/L (ref 22–29)
CO2 SERPL-SCNC: 22.6 MMOL/L (ref 22–29)
COCAINE UR QL: NEGATIVE
COCAINE UR QL: NEGATIVE
COLOR UR: YELLOW
CREAT SERPL-MCNC: 0.93 MG/DL (ref 0.57–1)
CREAT SERPL-MCNC: 1.01 MG/DL (ref 0.57–1)
DEPRECATED RDW RBC AUTO: 46.7 FL (ref 37–54)
DEPRECATED RDW RBC AUTO: 47.4 FL (ref 37–54)
EGFRCR SERPLBLD CKD-EPI 2021: 78.9 ML/MIN/1.73
EGFRCR SERPLBLD CKD-EPI 2021: 87.1 ML/MIN/1.73
EOSINOPHIL # BLD AUTO: 0.16 10*3/MM3 (ref 0–0.4)
EOSINOPHIL # BLD AUTO: 0.29 10*3/MM3 (ref 0–0.4)
EOSINOPHIL NFR BLD AUTO: 1.4 % (ref 0.3–6.2)
EOSINOPHIL NFR BLD AUTO: 1.8 % (ref 0.3–6.2)
ERYTHROCYTE [DISTWIDTH] IN BLOOD BY AUTOMATED COUNT: 14.3 % (ref 12.3–15.4)
ERYTHROCYTE [DISTWIDTH] IN BLOOD BY AUTOMATED COUNT: 14.6 % (ref 12.3–15.4)
ETHANOL BLD-MCNC: <10 MG/DL (ref 0–10)
ETHANOL BLD-MCNC: <10 MG/DL (ref 0–10)
ETHANOL UR QL: <0.01 %
ETHANOL UR QL: <0.01 %
GLOBULIN UR ELPH-MCNC: 3 GM/DL
GLOBULIN UR ELPH-MCNC: 3.5 GM/DL
GLUCOSE BLDC GLUCOMTR-MCNC: 147 MG/DL (ref 70–99)
GLUCOSE SERPL-MCNC: 125 MG/DL (ref 65–99)
GLUCOSE SERPL-MCNC: 146 MG/DL (ref 65–99)
GLUCOSE UR STRIP-MCNC: NEGATIVE MG/DL
HCT VFR BLD AUTO: 36.2 % (ref 34–46.6)
HCT VFR BLD AUTO: 44.8 % (ref 34–46.6)
HGB BLD-MCNC: 12.7 G/DL (ref 12–15.9)
HGB BLD-MCNC: 15.6 G/DL (ref 12–15.9)
HGB UR QL STRIP.AUTO: NEGATIVE
HOLD SPECIMEN: NORMAL
HYALINE CASTS UR QL AUTO: ABNORMAL /LPF
IMM GRANULOCYTES # BLD AUTO: 0.08 10*3/MM3 (ref 0–0.05)
IMM GRANULOCYTES # BLD AUTO: 0.29 10*3/MM3 (ref 0–0.05)
IMM GRANULOCYTES NFR BLD AUTO: 0.9 % (ref 0–0.5)
IMM GRANULOCYTES NFR BLD AUTO: 1.4 % (ref 0–0.5)
KETONES UR QL STRIP: ABNORMAL
LEUKOCYTE ESTERASE UR QL STRIP.AUTO: NEGATIVE
LYMPHOCYTES # BLD AUTO: 2.03 10*3/MM3 (ref 0.7–3.1)
LYMPHOCYTES # BLD AUTO: 5.38 10*3/MM3 (ref 0.7–3.1)
LYMPHOCYTES NFR BLD AUTO: 22.5 % (ref 19.6–45.3)
LYMPHOCYTES NFR BLD AUTO: 25.6 % (ref 19.6–45.3)
MCH RBC QN AUTO: 31.2 PG (ref 26.6–33)
MCH RBC QN AUTO: 31.4 PG (ref 26.6–33)
MCHC RBC AUTO-ENTMCNC: 34.8 G/DL (ref 31.5–35.7)
MCHC RBC AUTO-ENTMCNC: 35.1 G/DL (ref 31.5–35.7)
MCV RBC AUTO: 89.4 FL (ref 79–97)
MCV RBC AUTO: 89.6 FL (ref 79–97)
METHADONE UR QL SCN: NEGATIVE
METHADONE UR QL SCN: NEGATIVE
MONOCYTES # BLD AUTO: 0.82 10*3/MM3 (ref 0.1–0.9)
MONOCYTES # BLD AUTO: 1.95 10*3/MM3 (ref 0.1–0.9)
MONOCYTES NFR BLD AUTO: 9.1 % (ref 5–12)
MONOCYTES NFR BLD AUTO: 9.3 % (ref 5–12)
NEUTROPHILS NFR BLD AUTO: 12.95 10*3/MM3 (ref 1.7–7)
NEUTROPHILS NFR BLD AUTO: 5.88 10*3/MM3 (ref 1.7–7)
NEUTROPHILS NFR BLD AUTO: 61.7 % (ref 42.7–76)
NEUTROPHILS NFR BLD AUTO: 65 % (ref 42.7–76)
NITRITE UR QL STRIP: NEGATIVE
NRBC BLD AUTO-RTO: 0 /100 WBC (ref 0–0.2)
NRBC BLD AUTO-RTO: 0 /100 WBC (ref 0–0.2)
OPIATES UR QL: NEGATIVE
OPIATES UR QL: NEGATIVE
OXYCODONE UR QL SCN: NEGATIVE
OXYCODONE UR QL SCN: NEGATIVE
PH UR STRIP.AUTO: <=5 [PH] (ref 5–8)
PLATELET # BLD AUTO: 246 10*3/MM3 (ref 140–450)
PLATELET # BLD AUTO: 400 10*3/MM3 (ref 140–450)
PMV BLD AUTO: 10.2 FL (ref 6–12)
PMV BLD AUTO: 10.3 FL (ref 6–12)
POTASSIUM SERPL-SCNC: 3.8 MMOL/L (ref 3.5–5.2)
POTASSIUM SERPL-SCNC: 4.3 MMOL/L (ref 3.5–5.2)
PROT SERPL-MCNC: 7.4 G/DL (ref 6–8.5)
PROT SERPL-MCNC: 8.9 G/DL (ref 6–8.5)
PROT UR QL STRIP: ABNORMAL
RBC # BLD AUTO: 4.05 10*6/MM3 (ref 3.77–5.28)
RBC # BLD AUTO: 5 10*6/MM3 (ref 3.77–5.28)
RBC # UR STRIP: ABNORMAL /HPF
REF LAB TEST METHOD: ABNORMAL
SALICYLATES SERPL-MCNC: <0.3 MG/DL
SALICYLATES SERPL-MCNC: <0.3 MG/DL
SARS-COV-2 RNA RESP QL NAA+PROBE: NOT DETECTED
SARS-COV-2 RNA RESP QL NAA+PROBE: NOT DETECTED
SODIUM SERPL-SCNC: 139 MMOL/L (ref 136–145)
SODIUM SERPL-SCNC: 141 MMOL/L (ref 136–145)
SP GR UR STRIP: 1.02 (ref 1–1.03)
SQUAMOUS #/AREA URNS HPF: ABNORMAL /HPF
UROBILINOGEN UR QL STRIP: ABNORMAL
WBC # UR STRIP: ABNORMAL /HPF
WBC NRBC COR # BLD: 20.98 10*3/MM3 (ref 3.4–10.8)
WBC NRBC COR # BLD: 9.03 10*3/MM3 (ref 3.4–10.8)
WHOLE BLOOD HOLD COAG: NORMAL
WHOLE BLOOD HOLD COAG: NORMAL
WHOLE BLOOD HOLD SPECIMEN: NORMAL
WHOLE BLOOD HOLD SPECIMEN: NORMAL

## 2022-07-19 PROCEDURE — 82077 ASSAY SPEC XCP UR&BREATH IA: CPT | Performed by: EMERGENCY MEDICINE

## 2022-07-19 PROCEDURE — 80307 DRUG TEST PRSMV CHEM ANLYZR: CPT | Performed by: EMERGENCY MEDICINE

## 2022-07-19 PROCEDURE — 85025 COMPLETE CBC W/AUTO DIFF WBC: CPT

## 2022-07-19 PROCEDURE — C9803 HOPD COVID-19 SPEC COLLECT: HCPCS

## 2022-07-19 PROCEDURE — 80143 DRUG ASSAY ACETAMINOPHEN: CPT | Performed by: EMERGENCY MEDICINE

## 2022-07-19 PROCEDURE — U0003 INFECTIOUS AGENT DETECTION BY NUCLEIC ACID (DNA OR RNA); SEVERE ACUTE RESPIRATORY SYNDROME CORONAVIRUS 2 (SARS-COV-2) (CORONAVIRUS DISEASE [COVID-19]), AMPLIFIED PROBE TECHNIQUE, MAKING USE OF HIGH THROUGHPUT TECHNOLOGIES AS DESCRIBED BY CMS-2020-01-R: HCPCS | Performed by: STUDENT IN AN ORGANIZED HEALTH CARE EDUCATION/TRAINING PROGRAM

## 2022-07-19 PROCEDURE — 80179 DRUG ASSAY SALICYLATE: CPT | Performed by: EMERGENCY MEDICINE

## 2022-07-19 PROCEDURE — 85025 COMPLETE CBC W/AUTO DIFF WBC: CPT | Performed by: EMERGENCY MEDICINE

## 2022-07-19 PROCEDURE — 99283 EMERGENCY DEPT VISIT LOW MDM: CPT

## 2022-07-19 PROCEDURE — 36415 COLL VENOUS BLD VENIPUNCTURE: CPT

## 2022-07-19 PROCEDURE — 71045 X-RAY EXAM CHEST 1 VIEW: CPT

## 2022-07-19 PROCEDURE — U0003 INFECTIOUS AGENT DETECTION BY NUCLEIC ACID (DNA OR RNA); SEVERE ACUTE RESPIRATORY SYNDROME CORONAVIRUS 2 (SARS-COV-2) (CORONAVIRUS DISEASE [COVID-19]), AMPLIFIED PROBE TECHNIQUE, MAKING USE OF HIGH THROUGHPUT TECHNOLOGIES AS DESCRIBED BY CMS-2020-01-R: HCPCS | Performed by: EMERGENCY MEDICINE

## 2022-07-19 PROCEDURE — 70450 CT HEAD/BRAIN W/O DYE: CPT

## 2022-07-19 PROCEDURE — 25010000002 ZIPRASIDONE MESYLATE PER 10 MG: Performed by: NURSE PRACTITIONER

## 2022-07-19 PROCEDURE — 81001 URINALYSIS AUTO W/SCOPE: CPT | Performed by: NURSE PRACTITIONER

## 2022-07-19 PROCEDURE — 99285 EMERGENCY DEPT VISIT HI MDM: CPT

## 2022-07-19 PROCEDURE — 80053 COMPREHEN METABOLIC PANEL: CPT | Performed by: EMERGENCY MEDICINE

## 2022-07-19 PROCEDURE — 82962 GLUCOSE BLOOD TEST: CPT

## 2022-07-19 PROCEDURE — 96372 THER/PROPH/DIAG INJ SC/IM: CPT

## 2022-07-19 RX ORDER — ZIPRASIDONE MESYLATE 20 MG/ML
20 INJECTION, POWDER, LYOPHILIZED, FOR SOLUTION INTRAMUSCULAR ONCE
Status: COMPLETED | OUTPATIENT
Start: 2022-07-19 | End: 2022-07-19

## 2022-07-19 RX ORDER — SODIUM CHLORIDE 0.9 % (FLUSH) 0.9 %
10 SYRINGE (ML) INJECTION AS NEEDED
Status: DISCONTINUED | OUTPATIENT
Start: 2022-07-19 | End: 2022-07-19 | Stop reason: HOSPADM

## 2022-07-19 RX ORDER — SODIUM CHLORIDE 0.9 % (FLUSH) 0.9 %
10 SYRINGE (ML) INJECTION AS NEEDED
Status: DISCONTINUED | OUTPATIENT
Start: 2022-07-19 | End: 2022-07-20 | Stop reason: HOSPADM

## 2022-07-19 RX ADMIN — ZIPRASIDONE MESYLATE 20 MG: 20 INJECTION, POWDER, LYOPHILIZED, FOR SOLUTION INTRAMUSCULAR at 02:32
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
## 2022-07-19 NOTE — OUTREACH NOTE
Prep Survey    Flowsheet Row Responses   [facility] facility patient discharged from? [facility]   Is LACE score < 7 ? No   Emergency Room discharge w/ pulse ox? No   Eligibility Not Eligible   What are the reasons patient is not eligible? Behavioral Health   Does the patient have one of the following disease processes/diagnoses(primary or secondary)? Other   Prep survey completed? Yes          MARISA TAM - Registered Nurse
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
## 2022-07-19 NOTE — DISCHARGE INSTRUCTIONS
Go back to [facility] to finish program    Keep follow up with [facility]    Continue all home meds

## 2022-07-20 NOTE — ED PROVIDER NOTES
Time: 9:39 PM EDT  Arrived by: private car  Chief Complaint: Hallucinations  History provided by: Patient  History is limited by: N/A     History of Present Illness:    Patient is a 26 y.o. year old female who presents to the emergency department with hallucinations. 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 Pt denies any current drug use. Pt states that she came in because she is having hallucinations/dreams that she is raping her children and she wants medication to make those dreams go away. No command hallucinations. Pt states that she has depression, but currently denies SI/HI. She denies nausea, vomiting, fever, chills, cough, congestion, diarrhea.       History provided by:  Patient   used: No        Similar Symptoms Previously: N/A  Recently seen: N/A      Patient Care Team  Primary Care Provider: Provider, No Known    Past Medical History:     Allergies   Allergen Reactions   • Augmentin [Amoxicillin-Pot Clavulanate] Anaphylaxis and Hives   • Latex Anaphylaxis and Hives   • Phenergan [Promethazine] Anaphylaxis and Hives   • Seroquel [Quetiapine] Anaphylaxis and Hives   • Trazodone Unknown (See Comments)     Seizures      Past Medical History:   Diagnosis Date   • Brain tumor (HCC)    • Suicidal intent      Past Surgical History:   Procedure Laterality Date   • TONSILLECTOMY       History reviewed. No pertinent family history.    Home Medications:  Prior to Admission medications    Medication Sig Start Date End Date Taking? Authorizing Provider   ARIPiprazole (ABILIFY) 10 MG tablet Take 1 tablet by mouth Daily. Indications: Major Depressive Disorder, Psychosis 7/19/22   Shawn Alejo MD   FLUoxetine (PROzac) 40 MG capsule Take 1 capsule by mouth Daily. Indications: Depression 7/18/22   Shawn Alejo MD   hydrOXYzine pamoate (VISTARIL) 25 MG capsule Take 1 capsule by mouth 3 (Three) Times a Day As Needed for Anxiety. Indications: Feeling Anxious 7/18/22   Sapna  "MD Shawn   traZODone (DESYREL) 50 MG tablet Take 1 tablet by mouth At Night As Needed for Sleep. Indications: Trouble Sleeping 7/1/22 7/1/22  Augie Philippe MD        Social History:   Social History     Tobacco Use   • Smoking status: Current Every Day Smoker     Years: 1.00     Types: Cigarettes   Substance Use Topics   • Alcohol use: Not Currently   • Drug use: Yes     Types: Methamphetamines     Comment: suboxone     Recent travel: not applicable     Review of Systems:  Review of Systems   Constitutional: Negative for chills and fever.   HENT: Negative for congestion, ear pain and sore throat.    Eyes: Negative for pain.   Respiratory: Negative for cough, chest tightness and shortness of breath.    Cardiovascular: Negative for chest pain.   Gastrointestinal: Negative for abdominal pain, diarrhea, nausea and vomiting.   Genitourinary: Negative for flank pain and hematuria.   Musculoskeletal: Negative for joint swelling.   Skin: Negative for pallor.   Neurological: Negative for seizures and headaches.   Psychiatric/Behavioral: Positive for hallucinations.   All other systems reviewed and are negative.       Physical Exam:  /70   Pulse 101   Temp 98.6 °F (37 °C)   Resp 20   Ht 162.6 cm (64\")   Wt 75.8 kg (167 lb 1.7 oz)   LMP 07/13/2022 (Approximate)   SpO2 98%   BMI 28.68 kg/m²     Physical Exam  Vitals and nursing note reviewed.   Constitutional:       General: She is not in acute distress.     Appearance: Normal appearance. She is not toxic-appearing.   HENT:      Head: Normocephalic and atraumatic.      Mouth/Throat:      Mouth: Mucous membranes are moist.   Eyes:      General: No scleral icterus.  Cardiovascular:      Rate and Rhythm: Normal rate and regular rhythm.      Pulses: Normal pulses.      Heart sounds: Normal heart sounds.   Pulmonary:      Effort: Pulmonary effort is normal. No respiratory distress.      Breath sounds: Normal breath sounds.   Abdominal:      General: Abdomen is " flat.      Palpations: Abdomen is soft.      Tenderness: There is no abdominal tenderness.   Musculoskeletal:         General: Normal range of motion.      Cervical back: Normal range of motion and neck supple.   Skin:     General: Skin is warm and dry.   Neurological:      Mental Status: She is alert and oriented to person, place, and time. Mental status is at baseline.   Psychiatric:         Mood and Affect: Mood is depressed.                Medications in the Emergency Department:  Medications   sodium chloride 0.9 % flush 10 mL (has no administration in time range)        Labs  Lab Results (last 24 hours)     Procedure Component Value Units Date/Time    CBC & Differential [[id number]]  (Abnormal) Collected: 07/19/22 0145    Specimen: Blood Updated: 07/19/22 0151    Narrative:      The following orders were created for panel order CBC & Differential.  Procedure                               Abnormality         Status                     ---------                               -----------         ------                     CBC Auto Differential[[id number]]        Abnormal            Final result                 Please view results for these tests on the individual orders.    Comprehensive Metabolic Panel [[id number]]  (Abnormal) Collected: 07/19/22 0145    Specimen: Blood Updated: 07/19/22 0216     Glucose 125 mg/dL      BUN 13 mg/dL      Creatinine 0.93 mg/dL      Sodium 141 mmol/L      Potassium 4.3 mmol/L      Chloride 101 mmol/L      CO2 14.5 mmol/L      Calcium 10.4 mg/dL      Total Protein 8.9 g/dL      Albumin 5.40 g/dL      ALT (SGPT) 56 U/L      AST (SGOT) 34 U/L      Alkaline Phosphatase 104 U/L      Total Bilirubin 0.4 mg/dL      Globulin 3.5 gm/dL      A/G Ratio 1.5 g/dL      BUN/Creatinine Ratio 14.0     Anion Gap 25.5 mmol/L      eGFR 87.1 mL/min/1.73      Comment: National Kidney Foundation and American Society of Nephrology (ASN) Task Force recommended calculation based on the Chronic Kidney Disease  Epidemiology Collaboration (CKD-EPI) equation refit without adjustment for race.       Narrative:      GFR Normal >60  Chronic Kidney Disease <60  Kidney Failure <15      Acetaminophen Level [[id number]]  (Normal) Collected: 07/19/22 0145    Specimen: Blood Updated: 07/19/22 0212     Acetaminophen <5.0 mcg/mL     Ethanol [[id number]] Collected: 07/19/22 0145    Specimen: Blood Updated: 07/19/22 0213     Ethanol <10 mg/dL      Ethanol % <0.010 %     Narrative:      Ethanol (Plasma)  <10 Essentially Negative    Toxic Concentrations           mg/dL    Flushing, slowing of reflexes    Impaired visual activity         Depression of CNS              >100  Possible Coma                  >300       Salicylate Level [[id number]]  (Normal) Collected: 07/19/22 0145    Specimen: Blood Updated: 07/19/22 0212     Salicylate <0.3 mg/dL     CBC Auto Differential [[id number]]  (Abnormal) Collected: 07/19/22 0145    Specimen: Blood Updated: 07/19/22 0151     WBC 20.98 10*3/mm3      RBC 5.00 10*6/mm3      Hemoglobin 15.6 g/dL      Hematocrit 44.8 %      MCV 89.6 fL      MCH 31.2 pg      MCHC 34.8 g/dL      RDW 14.3 %      RDW-SD 46.7 fl      MPV 10.2 fL      Platelets 400 10*3/mm3      Neutrophil % 61.7 %      Lymphocyte % 25.6 %      Monocyte % 9.3 %      Eosinophil % 1.4 %      Basophil % 0.6 %      Immature Grans % 1.4 %      Neutrophils, Absolute 12.95 10*3/mm3      Lymphocytes, Absolute 5.38 10*3/mm3      Monocytes, Absolute 1.95 10*3/mm3      Eosinophils, Absolute 0.29 10*3/mm3      Basophils, Absolute 0.12 10*3/mm3      Immature Grans, Absolute 0.29 10*3/mm3      nRBC 0.0 /100 WBC     COVID-19,CEPHEID/ILIA,COR/MARIA E/PAD/THIAGO IN-HOUSE(OR EMERGENT/ADD-ON),NP SWAB IN TRANSPORT MEDIA 3-4 HR TAT, RT-PCR - Swab, Nasopharynx [[id number]]  (Normal) Collected: 07/19/22 0217    Specimen: Swab from Nasopharynx Updated: 07/19/22 0309     COVID19 Not Detected    Narrative:      Fact sheet for providers:  https://www.fda.gov/media/555389/download     Fact sheet for patients: https://www.fda.gov/media/053622/download  Fact sheet for providers: https://www.fda.gov/media/471469/download     Fact sheet for patients: https://www.fda.gov/media/850021/download    POC Glucose Once [[id number]]  (Abnormal) Collected: 07/19/22 0230    Specimen: Blood Updated: 07/19/22 0231     Glucose 147 mg/dL      Comment: Serial Number: [id number]:  [id number]       Urine Drug Screen - Urine, Clean Catch [[id number]]  (Normal) Collected: 07/19/22 0254    Specimen: Urine, Clean Catch Updated: 07/19/22 0323     Amphet/Methamphet, Screen Negative     Barbiturates Screen, Urine Negative     Benzodiazepine Screen, Urine Negative     Cocaine Screen, Urine Negative     Opiate Screen Negative     THC, Screen, Urine Negative     Methadone Screen, Urine Negative     Oxycodone Screen, Urine Negative    Narrative:      Negative Thresholds Per Drugs Screened:    Amphetamines                 500 ng/ml  Barbiturates                 200 ng/ml  Benzodiazepines              100 ng/ml  Cocaine                      300 ng/ml  Methadone                    300 ng/ml  Opiates                      300 ng/ml  Oxycodone                    100 ng/ml  THC                           50 ng/ml    The Normal Value for all drugs tested is negative. This report includes final unconfirmed screening results to be used for medical treatment purposes only. Unconfirmed results must not be used for non-medical purposes such as employment or legal testing. Clinical consideration should be applied to any drug of abuse test, particularly when unconfirmed results are used.            Urinalysis With Microscopic If Indicated (No Culture) - Urine, Clean Catch [[id number]]  (Abnormal) Collected: 07/19/22 0254    Specimen: Urine, Clean Catch Updated: 07/19/22 0449     Color, UA Yellow     Appearance, UA Clear     pH, UA <=5.0     Specific Gravity, UA 1.025     Glucose, UA Negative      Ketones, UA Trace     Bilirubin, UA Negative     Blood, UA Negative     Protein, UA 30 mg/dL (1+)     Leuk Esterase, UA Negative     Nitrite, UA Negative     Urobilinogen, UA 1.0 E.U./dL    Urinalysis, Microscopic Only - Urine, Clean Catch [[id number]]  (Abnormal) Collected: 07/19/22 0254    Specimen: Urine, Clean Catch Updated: 07/19/22 0449     RBC, UA 0-2 /HPF      WBC, UA 0-2 /HPF      Bacteria, UA None Seen /HPF      Squamous Epithelial Cells, UA 0-2 /HPF      Hyaline Casts, UA 0-2 /LPF      Methodology Automated Microscopy    CBC & Differential [[id number]]  (Abnormal) Collected: 07/19/22 2023    Specimen: Blood Updated: 07/19/22 2107    Narrative:      The following orders were created for panel order CBC & Differential.  Procedure                               Abnormality         Status                     ---------                               -----------         ------                     CBC Auto Differential[[id number]]        Abnormal            Final result                 Please view results for these tests on the individual orders.    Comprehensive Metabolic Panel [[id number]]  (Abnormal) Collected: 07/19/22 2023    Specimen: Blood Updated: 07/19/22 2107     Glucose 146 mg/dL      BUN 17 mg/dL      Creatinine 1.01 mg/dL      Sodium 139 mmol/L      Potassium 3.8 mmol/L      Chloride 103 mmol/L      CO2 22.6 mmol/L      Calcium 9.4 mg/dL      Total Protein 7.4 g/dL      Albumin 4.40 g/dL      ALT (SGPT) 44 U/L      AST (SGOT) 47 U/L      Alkaline Phosphatase 95 U/L      Total Bilirubin 0.4 mg/dL      Globulin 3.0 gm/dL      A/G Ratio 1.5 g/dL      BUN/Creatinine Ratio 16.8     Anion Gap 13.4 mmol/L      eGFR 78.9 mL/min/1.73      Comment: National Kidney Foundation and American Society of Nephrology (ASN) Task Force recommended calculation based on the Chronic Kidney Disease Epidemiology Collaboration (CKD-EPI) equation refit without adjustment for race.       Narrative:      GFR Normal >60  Chronic  Kidney Disease <60  Kidney Failure <15      Acetaminophen Level [[id number]]  (Normal) Collected: 07/19/22 2023    Specimen: Blood Updated: 07/19/22 2107     Acetaminophen <5.0 mcg/mL     Ethanol [[id number]] Collected: 07/19/22 2023    Specimen: Blood Updated: 07/19/22 2107     Ethanol <10 mg/dL      Ethanol % <0.010 %     Narrative:      Ethanol (Plasma)  <10 Essentially Negative    Toxic Concentrations           mg/dL    Flushing, slowing of reflexes    Impaired visual activity         Depression of CNS              >100  Possible Coma                  >300       Salicylate Level [[id number]]  (Normal) Collected: 07/19/22 2023    Specimen: Blood Updated: 07/19/22 2107     Salicylate <0.3 mg/dL     CBC Auto Differential [[id number]]  (Abnormal) Collected: 07/19/22 2023    Specimen: Blood Updated: 07/19/22 2107     WBC 9.03 10*3/mm3      RBC 4.05 10*6/mm3      Hemoglobin 12.7 g/dL      Hematocrit 36.2 %      MCV 89.4 fL      MCH 31.4 pg      MCHC 35.1 g/dL      RDW 14.6 %      RDW-SD 47.4 fl      MPV 10.3 fL      Platelets 246 10*3/mm3      Neutrophil % 65.0 %      Lymphocyte % 22.5 %      Monocyte % 9.1 %      Eosinophil % 1.8 %      Basophil % 0.7 %      Immature Grans % 0.9 %      Neutrophils, Absolute 5.88 10*3/mm3      Lymphocytes, Absolute 2.03 10*3/mm3      Monocytes, Absolute 0.82 10*3/mm3      Eosinophils, Absolute 0.16 10*3/mm3      Basophils, Absolute 0.06 10*3/mm3      Immature Grans, Absolute 0.08 10*3/mm3      nRBC 0.0 /100 WBC     Urine Drug Screen - Urine, Clean Catch [[id number]]  (Normal) Collected: 07/19/22 2028    Specimen: Urine, Clean Catch Updated: 07/19/22 2117     Amphet/Methamphet, Screen Negative     Barbiturates Screen, Urine Negative     Benzodiazepine Screen, Urine Negative     Cocaine Screen, Urine Negative     Opiate Screen Negative     THC, Screen, Urine Negative     Methadone Screen, Urine Negative     Oxycodone Screen, Urine Negative    Narrative:      Negative Thresholds  Per Drugs Screened:    Amphetamines                 500 ng/ml  Barbiturates                 200 ng/ml  Benzodiazepines              100 ng/ml  Cocaine                      300 ng/ml  Methadone                    300 ng/ml  Opiates                      300 ng/ml  Oxycodone                    100 ng/ml  THC                           50 ng/ml    The Normal Value for all drugs tested is negative. This report includes final unconfirmed screening results to be used for medical treatment purposes only. Unconfirmed results must not be used for non-medical purposes such as employment or legal testing. Clinical consideration should be applied to any drug of abuse test, particularly when unconfirmed results are used.            COVID-19,CEPHEID/ILIA,COR/MARIA E/PAD/THIAGO IN-HOUSE(OR EMERGENT/ADD-ON),NP SWAB IN TRANSPORT MEDIA 3-4 HR TAT, RT-PCR - Swab, Nasopharynx [[id number]]  (Normal) Collected: 07/19/22 2036    Specimen: Swab from Nasopharynx Updated: 07/19/22 2118     COVID19 Not Detected    Narrative:      Fact sheet for providers: https://www.fda.gov/media/305034/download     Fact sheet for patients: https://www.fda.gov/media/411035/download  Fact sheet for providers: https://www.fda.gov/media/523765/download     Fact sheet for patients: https://www.fda.gov/media/181646/download           Imaging:  CT Head Without Contrast    Result Date: 7/19/2022  PROCEDURE: CT HEAD WO CONTRAST  COMPARISON:  No images available for comparison.  Outside CT head report from 6/25/2022 described 2.3 cm intraparenchymal lesion right head of caudate and anterior limb of internal capsule with hyperdensity on pre contrast images and mild enhancement on CT.  Outside MRI brain report from 6/25/2022 described intraparenchymal mass lesion of right head of caudate and anterior limb of internal capsule measuring up to 2.5 cm on MRI.  INDICATIONS: ALTERED MENTAL STATUS  PROTOCOL:   Standard imaging protocol performed    RADIATION:   DLP: 890.9 mGy*cm   MA  and/or KV was adjusted to minimize radiation dose.     TECHNIQUE: After obtaining the patient's consent, CT images were obtained without non-ionic intravenous contrast material.  FINDINGS:  2.3 cm peripherally hyperdense mass with central hypodensity is centered in the right caudate head.  There is no significant surrounding edema.  Ventricles are nondilated.  No fractures are seen.  There is partial opacification of right mastoid air cells.  Paranasal sinuses are clear.  Filling defects in the external auditory canals are presumably due to cerumen.        2.3 cm peripherally hyperdense mass centered in the right caudate head, which is likely due to a hemorrhagic mass when correlated with outside imaging reports.  Although the outside images are not available for comparison, the size is stable compared to 6/25/2022 outside CT report.      NAHEED VILLANUEVA MD       Electronically Signed and Approved By: NAHEED VILLANUEVA MD on 7/19/2022 at 4:19             XR Chest 1 View    Result Date: 7/19/2022  PROCEDURE: XR CHEST 1 VW  COMPARISON: None  INDICATIONS: wbc 20, pre admit testing  FINDINGS:  There are mild bibasilar airspace opacities.  No pneumothorax or large pleural effusion is seen.  Cardiomediastinal contours are likely within normal limits, allowing for portable AP technique.        Mild bibasilar airspace opacities, which may be due to atelectasis and or pneumonia.       NAHEED VILLANUEVA MD       Electronically Signed and Approved By: NAHEED VILLANUEVA MD on 7/19/2022 at 4:58               Procedures:  Procedures    Progress                            Medical Decision Making:  MDM  Number of Diagnoses or Management Options  Diagnosis management comments:   Patient is afebrile nontoxic-appearing.  Vital signs are stable.  At time of evaluation she is lying bed no acute distress.  Patient presented to the emergency department with complaints of hallucinations and bad dreams.  She initially reported suicide  ideation when I talk to patient she denies any suicidal ideation.  Patient is currently detoxing from methamphetamine.  Labs were obtained and showed no significant abnormality.  I discussed patient with [facility] who came and evaluated patient.  They recommend sending her to the [facility] for further care.  Patient is comfortable with plan and will be sent there for further psychiatric evaluation and care. Discussed return precautions, discharge instructions and answered all her questions          Amount and/or Complexity of Data Reviewed  Clinical lab tests: ordered and reviewed  Tests in the radiology section of CPT®: ordered and reviewed    Risk of Complications, Morbidity, and/or Mortality  Presenting problems: moderate  Management options: moderate    Patient Progress  Patient progress: stable       Final diagnoses:   Hallucinations        Disposition:  ED Disposition     ED Disposition   Discharge    Condition   Stable    Comment   --                Vivian Agarwal  07/19/22 [id number]       Sravani Penaloza MD  07/19/22 [id number]

## 2022-07-21 ENCOUNTER — HOSPITAL ENCOUNTER (EMERGENCY)
Facility: HOSPITAL | Age: 27
Discharge: HOME | End: 2022-07-21
Attending: EMERGENCY MEDICINE | Admitting: EMERGENCY MEDICINE

## 2022-07-21 VITALS
RESPIRATION RATE: 18 BRPM | BODY MASS INDEX: 28.45 KG/M2 | OXYGEN SATURATION: 100 % | HEIGHT: 64 IN | WEIGHT: 166.67 LBS | TEMPERATURE: 99.3 F | DIASTOLIC BLOOD PRESSURE: 74 MMHG | HEART RATE: 78 BPM | SYSTOLIC BLOOD PRESSURE: 123 MMHG

## 2022-07-21 DIAGNOSIS — Z00.00 ENCOUNTER FOR GENERAL MEDICAL EXAMINATION: Primary | ICD-10-CM

## 2022-07-21 LAB
ALBUMIN SERPL-MCNC: 4.3 G/DL (ref 3.5–5.2)
ALBUMIN/GLOB SERPL: 1.5 G/DL
ALP SERPL-CCNC: 89 U/L (ref 39–117)
ALT SERPL W P-5'-P-CCNC: 47 U/L (ref 1–33)
AMPHET+METHAMPHET UR QL: NEGATIVE
ANION GAP SERPL CALCULATED.3IONS-SCNC: 8.5 MMOL/L (ref 5–15)
APAP SERPL-MCNC: <5 MCG/ML (ref 0–30)
AST SERPL-CCNC: 42 U/L (ref 1–32)
BARBITURATES UR QL SCN: POSITIVE
BASOPHILS # BLD AUTO: 0.03 10*3/MM3 (ref 0–0.2)
BASOPHILS NFR BLD AUTO: 0.6 % (ref 0–1.5)
BENZODIAZ UR QL SCN: NEGATIVE
BILIRUB SERPL-MCNC: 0.3 MG/DL (ref 0–1.2)
BUN SERPL-MCNC: 14 MG/DL (ref 6–20)
BUN/CREAT SERPL: 17.1 (ref 7–25)
C TRACH RRNA CVX QL NAA+PROBE: NOT DETECTED
CALCIUM SPEC-SCNC: 9 MG/DL (ref 8.6–10.5)
CANDIDA SPECIES: NEGATIVE
CANNABINOIDS SERPL QL: NEGATIVE
CHLORIDE SERPL-SCNC: 105 MMOL/L (ref 98–107)
CO2 SERPL-SCNC: 25.5 MMOL/L (ref 22–29)
COCAINE UR QL: NEGATIVE
CREAT SERPL-MCNC: 0.82 MG/DL (ref 0.57–1)
DEPRECATED RDW RBC AUTO: 47.4 FL (ref 37–54)
EGFRCR SERPLBLD CKD-EPI 2021: 101.3 ML/MIN/1.73
EOSINOPHIL # BLD AUTO: 0.09 10*3/MM3 (ref 0–0.4)
EOSINOPHIL NFR BLD AUTO: 1.7 % (ref 0.3–6.2)
ERYTHROCYTE [DISTWIDTH] IN BLOOD BY AUTOMATED COUNT: 14.1 % (ref 12.3–15.4)
ETHANOL BLD-MCNC: <10 MG/DL (ref 0–10)
ETHANOL UR QL: <0.01 %
GARDNERELLA VAGINALIS: NEGATIVE
GLOBULIN UR ELPH-MCNC: 2.8 GM/DL
GLUCOSE BLDC GLUCOMTR-MCNC: 95 MG/DL (ref 70–99)
GLUCOSE SERPL-MCNC: 100 MG/DL (ref 65–99)
HCT VFR BLD AUTO: 37.4 % (ref 34–46.6)
HGB BLD-MCNC: 13 G/DL (ref 12–15.9)
HOLD SPECIMEN: NORMAL
HOLD SPECIMEN: NORMAL
IMM GRANULOCYTES # BLD AUTO: 0.04 10*3/MM3 (ref 0–0.05)
IMM GRANULOCYTES NFR BLD AUTO: 0.8 % (ref 0–0.5)
LYMPHOCYTES # BLD AUTO: 1.07 10*3/MM3 (ref 0.7–3.1)
LYMPHOCYTES NFR BLD AUTO: 20.8 % (ref 19.6–45.3)
MCH RBC QN AUTO: 31.8 PG (ref 26.6–33)
MCHC RBC AUTO-ENTMCNC: 34.8 G/DL (ref 31.5–35.7)
MCV RBC AUTO: 91.4 FL (ref 79–97)
METHADONE UR QL SCN: NEGATIVE
MONOCYTES # BLD AUTO: 0.37 10*3/MM3 (ref 0.1–0.9)
MONOCYTES NFR BLD AUTO: 7.2 % (ref 5–12)
N GONORRHOEA RRNA SPEC QL NAA+PROBE: NOT DETECTED
NEUTROPHILS NFR BLD AUTO: 3.55 10*3/MM3 (ref 1.7–7)
NEUTROPHILS NFR BLD AUTO: 68.9 % (ref 42.7–76)
NRBC BLD AUTO-RTO: 0 /100 WBC (ref 0–0.2)
OPIATES UR QL: NEGATIVE
OXYCODONE UR QL SCN: NEGATIVE
PLATELET # BLD AUTO: 234 10*3/MM3 (ref 140–450)
PMV BLD AUTO: 10 FL (ref 6–12)
POTASSIUM SERPL-SCNC: 4.1 MMOL/L (ref 3.5–5.2)
PROT SERPL-MCNC: 7.1 G/DL (ref 6–8.5)
RBC # BLD AUTO: 4.09 10*6/MM3 (ref 3.77–5.28)
SALICYLATES SERPL-MCNC: <0.3 MG/DL
SARS-COV-2 RNA PNL SPEC NAA+PROBE: NOT DETECTED
SODIUM SERPL-SCNC: 139 MMOL/L (ref 136–145)
T VAGINALIS DNA VAG QL PROBE+SIG AMP: NEGATIVE
WBC NRBC COR # BLD: 5.15 10*3/MM3 (ref 3.4–10.8)
WHOLE BLOOD HOLD COAG: NORMAL
WHOLE BLOOD HOLD SPECIMEN: NORMAL

## 2022-07-21 PROCEDURE — 87510 GARDNER VAG DNA DIR PROBE: CPT | Performed by: REGISTERED NURSE

## 2022-07-21 PROCEDURE — 80307 DRUG TEST PRSMV CHEM ANLYZR: CPT | Performed by: EMERGENCY MEDICINE

## 2022-07-21 PROCEDURE — U0004 COV-19 TEST NON-CDC HGH THRU: HCPCS | Performed by: EMERGENCY MEDICINE

## 2022-07-21 PROCEDURE — 80053 COMPREHEN METABOLIC PANEL: CPT | Performed by: EMERGENCY MEDICINE

## 2022-07-21 PROCEDURE — 82962 GLUCOSE BLOOD TEST: CPT

## 2022-07-21 PROCEDURE — C9803 HOPD COVID-19 SPEC COLLECT: HCPCS | Performed by: EMERGENCY MEDICINE

## 2022-07-21 PROCEDURE — 99283 EMERGENCY DEPT VISIT LOW MDM: CPT

## 2022-07-21 PROCEDURE — 87480 CANDIDA DNA DIR PROBE: CPT | Performed by: REGISTERED NURSE

## 2022-07-21 PROCEDURE — 85025 COMPLETE CBC W/AUTO DIFF WBC: CPT | Performed by: EMERGENCY MEDICINE

## 2022-07-21 PROCEDURE — 80143 DRUG ASSAY ACETAMINOPHEN: CPT | Performed by: EMERGENCY MEDICINE

## 2022-07-21 PROCEDURE — 87591 N.GONORRHOEAE DNA AMP PROB: CPT | Performed by: REGISTERED NURSE

## 2022-07-21 PROCEDURE — 80179 DRUG ASSAY SALICYLATE: CPT | Performed by: EMERGENCY MEDICINE

## 2022-07-21 PROCEDURE — 82077 ASSAY SPEC XCP UR&BREATH IA: CPT | Performed by: EMERGENCY MEDICINE

## 2022-07-21 PROCEDURE — 87660 TRICHOMONAS VAGIN DIR PROBE: CPT | Performed by: REGISTERED NURSE

## 2022-07-21 PROCEDURE — 87491 CHLMYD TRACH DNA AMP PROBE: CPT | Performed by: REGISTERED NURSE

## 2022-07-21 RX ORDER — SODIUM CHLORIDE 0.9 % (FLUSH) 0.9 %
10 SYRINGE (ML) INJECTION AS NEEDED
Status: DISCONTINUED | OUTPATIENT
Start: 2022-07-21 | End: 2022-07-21 | Stop reason: HOSPADM

## 2022-07-21 NOTE — DISCHARGE INSTRUCTIONS
Your swab results are pending, we will call you if they are abnormal.  Your lab work today did not reveal any concerning findings.      Return to recovery works to continue your addiction treatment.    Return for any new concerns.

## 2022-07-21 NOTE — ED NOTES
Patient states to this nurse she is here to get her medications lined out, reports that she has bipolar and depression and needs her medications, patient denies any SI or HI

## 2022-07-21 NOTE — ED PROVIDER NOTES
"Time: 5:00 PM EDT  Arrived by: SINCERE  Chief Complaint: Shaky feeling  History provided by: Patient  History is limited by: Poor historian      History of Present Illness:  Patient is a 26 y.o. year old female who presents to the emergency department with complaint of feeling shaky and wanting her medications adjusted.  Patient reports a history of depression and bipolar disorder.  She reports that she has been on Abilify, Prozac and Vistaril and thinks that the medications are causing her symptoms of feeling shaky.  She denies suicidal or homicidal ideation.  She reports that she is currently at [employer] [facility] in for methamphetamine addiction.  She does not know how long ago she last used, but denies any current use.  Patient states \"I am sorry for lying about raping my children\".  She reports that she has 2 children that live with her mother-in-law that she has not seen in \"a long time\".        HPI    Similar Symptoms Previously: SINCERE  Recently seen: Yes, here, 3 days ago for hallucinations, admitted to the [facility]      Patient Care Team  Primary Care Provider: Provider, No Known    Past Medical History:     Allergies   Allergen Reactions   • Augmentin [Amoxicillin-Pot Clavulanate] Anaphylaxis and Hives   • Latex Anaphylaxis and Hives   • Phenergan [Promethazine] Anaphylaxis and Hives   • Seroquel [Quetiapine] Anaphylaxis and Hives   • Trazodone Unknown (See Comments)     Seizures      Past Medical History:   Diagnosis Date   • Brain tumor (HCC)    • Suicidal intent      Past Surgical History:   Procedure Laterality Date   • TONSILLECTOMY       No family history on file.    Home Medications:  Prior to Admission medications    Medication Sig Start Date End Date Taking? Authorizing Provider   ARIPiprazole (ABILIFY) 10 MG tablet Take 1 tablet by mouth Daily. Indications: Major Depressive Disorder, Psychosis 7/19/22   Shawn Alejo MD   FLUoxetine (PROzac) 40 MG capsule Take 1 capsule by mouth Daily. Indications: Depression " "7/18/22   Shawn Alejo MD   hydrOXYzine pamoate (VISTARIL) 25 MG capsule Take 1 capsule by mouth 3 (Three) Times a Day As Needed for Anxiety. Indications: Feeling Anxious 7/18/22   Shawn Alejo MD   traZODone (DESYREL) 50 MG tablet Take 1 tablet by mouth At Night As Needed for Sleep. Indications: Trouble Sleeping 7/1/22 7/1/22  Augie Philippe MD        Social History:   Social History     Tobacco Use   • Smoking status: Current Every Day Smoker     Years: 1.00     Types: Cigarettes   Substance Use Topics   • Alcohol use: Not Currently   • Drug use: Yes     Types: Methamphetamines     Comment: suboxone     Recent travel: no     Review of Systems:  Review of Systems     Physical Exam:  /74 (BP Location: Right arm, Patient Position: Lying)   Pulse 78   Temp 99.3 °F (37.4 °C) (Oral)   Resp 18   Ht 162.6 cm (64\")   Wt 75.6 kg (166 lb 10.7 oz)   LMP 07/13/2022 (Approximate)   SpO2 100%   BMI 28.61 kg/m²     Physical Exam     General: Awake alert and in no acute distress     HEENT: Head normocephalic atraumatic, eyes PERRLA EOMI, nose normal, oropharynx normal.     Neck: Supple full range of motion, no meningismus, no lymphadenopathy     Heart: Regular rate and rhythm, no murmurs or rubs, 2+ radial pulses bilaterally     Lungs: Clear to auscultation bilaterally without wheezes or crackles, no respiratory distress     Abdomen: Soft, nontender, nondistended, no rebound or guarding     Back exam:  No L-spine tenderness.  No rash.     Skin: Warm, dry, no rash     Musculoskeletal: Normal range of motion, no lower extremity edema     Neurologic: Oriented x3, no motor deficits no sensory deficits     Psychiatric: Depressed mood, speech normal, no psychosis, cooperative        Medications in the Emergency Department:  Medications - No data to display     Labs  Lab Results (last 24 hours)     ** No results found for the last 24 hours. **           Imaging:  No Radiology Exams Resulted Within Past 24 " "Hours    Procedures:  Procedures    Progress  ED Course as of 08/05/22 1022   Thu Jul 21, 2022   1819 --- PROVIDER IN TRIAGE NOTE ---    The patient was evaluated my me, Sigifredo Charles in triage. Orders were placed and the patient is currently awaiting disposition. [AJ]   1948 Patient requests a shot for \"trich\", complains of foul-smelling vaginal discharge but denies itching, bleeding, fever/chills, urinary complaints.  Will swab patient and notify of results. [CW]      ED Course User Index  [AJ] Sigifredo Charles PA-C  [CW] Radha He APRN                            The patient was initially evaluated in the triage area where orders were placed. The patient was later dispositioned by JOSE F Condon.      Medical Decision Making:  MDM  Number of Diagnoses or Management Options  Encounter for general medical examination  Diagnosis management comments: Patient was seen here 2 days ago for hallucinations.  The patient was evaluated by [facility] by [facility] at that time and admitted to [facility].    Patient was evaluated by community care staff member, Holly.  She states the patient was [facility] from [facility]  [facility] send the [facility] to ED for medical clearance due to the patient's feeling shaky.    Patient requested trichomonas treatment just prior to discharge.  Swabs were obtained and are pending results.  Advised the patient that she will get a call if they should come back positive.  The patient´s CBC was reviewed and shows no abnormalities of critical concern. Of note, there is no anemia requiring a blood transfusion and the platelet count is acceptable. The patient´s CMP was reviewed and shows no abnormalities of critical concern. Of note, the patient´s sodium and potassium are acceptable. The patient´s liver enzymes are unremarkable. The patient´s renal function (creatinine) is preserved. The patient has a normal anion gap.     The patient " denies suicidal thoughts or ideation.  Patient is stable to be discharged to [facility].    [facility] will [facility] to [facility] to continue her addiction treatment.           Amount and/or Complexity of Data Reviewed  Clinical lab tests: reviewed and ordered    Risk of Complications, Morbidity, and/or Mortality  Presenting problems: low  Diagnostic procedures: minimal  Management options: minimal    Patient Progress  Patient progress: stable       Final diagnoses:   Encounter for general medical examination        Disposition:  ED Disposition     ED Disposition   Discharge    Condition   Stable    Comment   --             This medical record created using voice recognition software.           Radha He APRN  07/21/22 0004       Radha He APRN  08/05/22 1024

## 2022-09-08 ENCOUNTER — HOSPITAL ENCOUNTER (INPATIENT)
Facility: HOSPITAL | Age: 27
LOS: 5 days | Discharge: HOME | End: 2022-09-13
Attending: PSYCHIATRY & NEUROLOGY | Admitting: PSYCHIATRY & NEUROLOGY

## 2022-09-08 ENCOUNTER — HOSPITAL ENCOUNTER (EMERGENCY)
Facility: HOSPITAL | Age: 27
Discharge: PSYCHIATRIC OR SUBSTANCE USE FACILITY | End: 2022-09-08
Attending: STUDENT IN AN ORGANIZED HEALTH CARE EDUCATION/TRAINING PROGRAM | Admitting: STUDENT IN AN ORGANIZED HEALTH CARE EDUCATION/TRAINING PROGRAM

## 2022-09-08 VITALS
SYSTOLIC BLOOD PRESSURE: 111 MMHG | HEART RATE: 78 BPM | RESPIRATION RATE: 16 BRPM | BODY MASS INDEX: 42.07 KG/M2 | OXYGEN SATURATION: 100 % | HEIGHT: 56 IN | DIASTOLIC BLOOD PRESSURE: 76 MMHG | TEMPERATURE: 98.1 F | WEIGHT: 187 LBS

## 2022-09-08 DIAGNOSIS — D49.6 BRAIN TUMOR: Primary | ICD-10-CM

## 2022-09-08 DIAGNOSIS — F33.3 MAJOR DEPRESSIVE DISORDER, RECURRENT EPISODE, SEVERE, WITH PSYCHOSIS: ICD-10-CM

## 2022-09-08 DIAGNOSIS — F32.A DEPRESSION WITH SUICIDAL IDEATION: Primary | ICD-10-CM

## 2022-09-08 DIAGNOSIS — R45.851 DEPRESSION WITH SUICIDAL IDEATION: Primary | ICD-10-CM

## 2022-09-08 LAB
ALBUMIN SERPL-MCNC: 4.36 G/DL (ref 3.5–5.2)
ALBUMIN/GLOB SERPL: 1.5 G/DL
ALP SERPL-CCNC: 93 U/L (ref 39–117)
ALT SERPL W P-5'-P-CCNC: 40 U/L (ref 1–33)
AMPHET+METHAMPHET UR QL: NEGATIVE
AMPHETAMINES UR QL: NEGATIVE
ANION GAP SERPL CALCULATED.3IONS-SCNC: 10.8 MMOL/L (ref 5–15)
AST SERPL-CCNC: 28 U/L (ref 1–32)
B-HCG UR QL: NEGATIVE
BARBITURATES UR QL SCN: NEGATIVE
BASOPHILS # BLD AUTO: 0.03 10*3/MM3 (ref 0–0.2)
BASOPHILS NFR BLD AUTO: 0.4 % (ref 0–1.5)
BENZODIAZ UR QL SCN: POSITIVE
BILIRUB SERPL-MCNC: 0.3 MG/DL (ref 0–1.2)
BILIRUB UR QL STRIP: NEGATIVE
BUN SERPL-MCNC: 16 MG/DL (ref 6–20)
BUN/CREAT SERPL: 21.1 (ref 7–25)
BUPRENORPHINE SERPL-MCNC: NEGATIVE NG/ML
CALCIUM SPEC-SCNC: 9.2 MG/DL (ref 8.6–10.5)
CANNABINOIDS SERPL QL: NEGATIVE
CHLORIDE SERPL-SCNC: 105 MMOL/L (ref 98–107)
CLARITY UR: ABNORMAL
CO2 SERPL-SCNC: 22.2 MMOL/L (ref 22–29)
COCAINE UR QL: NEGATIVE
COLOR UR: ABNORMAL
CREAT SERPL-MCNC: 0.76 MG/DL (ref 0.57–1)
DEPRECATED RDW RBC AUTO: 39.9 FL (ref 37–54)
EGFRCR SERPLBLD CKD-EPI 2021: 111 ML/MIN/1.73
EOSINOPHIL # BLD AUTO: 0.18 10*3/MM3 (ref 0–0.4)
EOSINOPHIL NFR BLD AUTO: 2.5 % (ref 0.3–6.2)
ERYTHROCYTE [DISTWIDTH] IN BLOOD BY AUTOMATED COUNT: 11.9 % (ref 12.3–15.4)
ETHANOL BLD-MCNC: <10 MG/DL (ref 0–10)
ETHANOL UR QL: <0.01 %
FLUAV RNA RESP QL NAA+PROBE: NOT DETECTED
FLUBV RNA RESP QL NAA+PROBE: NOT DETECTED
GLOBULIN UR ELPH-MCNC: 2.8 GM/DL
GLUCOSE SERPL-MCNC: 94 MG/DL (ref 65–99)
GLUCOSE UR STRIP-MCNC: NEGATIVE MG/DL
HCT VFR BLD AUTO: 41.5 % (ref 34–46.6)
HGB BLD-MCNC: 14.1 G/DL (ref 12–15.9)
HGB UR QL STRIP.AUTO: NEGATIVE
IMM GRANULOCYTES # BLD AUTO: 0.04 10*3/MM3 (ref 0–0.05)
IMM GRANULOCYTES NFR BLD AUTO: 0.6 % (ref 0–0.5)
KETONES UR QL STRIP: ABNORMAL
LEUKOCYTE ESTERASE UR QL STRIP.AUTO: NEGATIVE
LYMPHOCYTES # BLD AUTO: 1.76 10*3/MM3 (ref 0.7–3.1)
LYMPHOCYTES NFR BLD AUTO: 24.2 % (ref 19.6–45.3)
MAGNESIUM SERPL-MCNC: 2.1 MG/DL (ref 1.6–2.6)
MCH RBC QN AUTO: 31.1 PG (ref 26.6–33)
MCHC RBC AUTO-ENTMCNC: 34 G/DL (ref 31.5–35.7)
MCV RBC AUTO: 91.4 FL (ref 79–97)
METHADONE UR QL SCN: NEGATIVE
MONOCYTES # BLD AUTO: 0.43 10*3/MM3 (ref 0.1–0.9)
MONOCYTES NFR BLD AUTO: 5.9 % (ref 5–12)
NEUTROPHILS NFR BLD AUTO: 4.82 10*3/MM3 (ref 1.7–7)
NEUTROPHILS NFR BLD AUTO: 66.4 % (ref 42.7–76)
NITRITE UR QL STRIP: NEGATIVE
NRBC BLD AUTO-RTO: 0 /100 WBC (ref 0–0.2)
OPIATES UR QL: NEGATIVE
OXYCODONE UR QL SCN: NEGATIVE
PCP UR QL SCN: NEGATIVE
PH UR STRIP.AUTO: 6 [PH] (ref 5–8)
PLATELET # BLD AUTO: 188 10*3/MM3 (ref 140–450)
PMV BLD AUTO: 9.6 FL (ref 6–12)
POTASSIUM SERPL-SCNC: 4.3 MMOL/L (ref 3.5–5.2)
PROPOXYPH UR QL: NEGATIVE
PROT SERPL-MCNC: 7.2 G/DL (ref 6–8.5)
PROT UR QL STRIP: ABNORMAL
RBC # BLD AUTO: 4.54 10*6/MM3 (ref 3.77–5.28)
SARS-COV-2 RNA RESP QL NAA+PROBE: NOT DETECTED
SODIUM SERPL-SCNC: 138 MMOL/L (ref 136–145)
SP GR UR STRIP: >1.03 (ref 1–1.03)
TRICYCLICS UR QL SCN: NEGATIVE
UROBILINOGEN UR QL STRIP: ABNORMAL
WBC NRBC COR # BLD: 7.26 10*3/MM3 (ref 3.4–10.8)

## 2022-09-08 PROCEDURE — 82077 ASSAY SPEC XCP UR&BREATH IA: CPT | Performed by: STUDENT IN AN ORGANIZED HEALTH CARE EDUCATION/TRAINING PROGRAM

## 2022-09-08 PROCEDURE — 36415 COLL VENOUS BLD VENIPUNCTURE: CPT

## 2022-09-08 PROCEDURE — 81025 URINE PREGNANCY TEST: CPT | Performed by: STUDENT IN AN ORGANIZED HEALTH CARE EDUCATION/TRAINING PROGRAM

## 2022-09-08 PROCEDURE — 85025 COMPLETE CBC W/AUTO DIFF WBC: CPT | Performed by: STUDENT IN AN ORGANIZED HEALTH CARE EDUCATION/TRAINING PROGRAM

## 2022-09-08 PROCEDURE — 93005 ELECTROCARDIOGRAM TRACING: CPT | Performed by: PSYCHIATRY & NEUROLOGY

## 2022-09-08 PROCEDURE — 81003 URINALYSIS AUTO W/O SCOPE: CPT | Performed by: STUDENT IN AN ORGANIZED HEALTH CARE EDUCATION/TRAINING PROGRAM

## 2022-09-08 PROCEDURE — 80053 COMPREHEN METABOLIC PANEL: CPT | Performed by: STUDENT IN AN ORGANIZED HEALTH CARE EDUCATION/TRAINING PROGRAM

## 2022-09-08 PROCEDURE — 93010 ELECTROCARDIOGRAM REPORT: CPT | Performed by: INTERNAL MEDICINE

## 2022-09-08 PROCEDURE — 99285 EMERGENCY DEPT VISIT HI MDM: CPT

## 2022-09-08 PROCEDURE — 83735 ASSAY OF MAGNESIUM: CPT | Performed by: STUDENT IN AN ORGANIZED HEALTH CARE EDUCATION/TRAINING PROGRAM

## 2022-09-08 PROCEDURE — 87636 SARSCOV2 & INF A&B AMP PRB: CPT | Performed by: STUDENT IN AN ORGANIZED HEALTH CARE EDUCATION/TRAINING PROGRAM

## 2022-09-08 PROCEDURE — 80306 DRUG TEST PRSMV INSTRMNT: CPT | Performed by: STUDENT IN AN ORGANIZED HEALTH CARE EDUCATION/TRAINING PROGRAM

## 2022-09-08 PROCEDURE — C9803 HOPD COVID-19 SPEC COLLECT: HCPCS

## 2022-09-08 RX ORDER — FAMOTIDINE 20 MG/1
20 TABLET, FILM COATED ORAL 2 TIMES DAILY PRN
Status: DISCONTINUED | OUTPATIENT
Start: 2022-09-08 | End: 2022-09-13 | Stop reason: HOSPADM

## 2022-09-08 RX ORDER — HYDROXYZINE 50 MG/1
50 TABLET, FILM COATED ORAL EVERY 6 HOURS PRN
Status: DISCONTINUED | OUTPATIENT
Start: 2022-09-08 | End: 2022-09-13 | Stop reason: HOSPADM

## 2022-09-08 RX ORDER — ALUMINA, MAGNESIA, AND SIMETHICONE 2400; 2400; 240 MG/30ML; MG/30ML; MG/30ML
15 SUSPENSION ORAL EVERY 6 HOURS PRN
Status: DISCONTINUED | OUTPATIENT
Start: 2022-09-08 | End: 2022-09-13 | Stop reason: HOSPADM

## 2022-09-08 RX ORDER — NICOTINE 21 MG/24HR
1 PATCH, TRANSDERMAL 24 HOURS TRANSDERMAL
Status: DISCONTINUED | OUTPATIENT
Start: 2022-09-08 | End: 2022-09-12

## 2022-09-08 RX ORDER — ONDANSETRON 4 MG/1
4 TABLET, FILM COATED ORAL EVERY 6 HOURS PRN
Status: DISCONTINUED | OUTPATIENT
Start: 2022-09-08 | End: 2022-09-13 | Stop reason: HOSPADM

## 2022-09-08 RX ORDER — LOPERAMIDE HYDROCHLORIDE 2 MG/1
2 CAPSULE ORAL
Status: DISCONTINUED | OUTPATIENT
Start: 2022-09-08 | End: 2022-09-13 | Stop reason: HOSPADM

## 2022-09-08 RX ORDER — BENZTROPINE MESYLATE 1 MG/1
2 TABLET ORAL ONCE AS NEEDED
Status: DISCONTINUED | OUTPATIENT
Start: 2022-09-08 | End: 2022-09-13 | Stop reason: HOSPADM

## 2022-09-08 RX ORDER — BENZTROPINE MESYLATE 1 MG/ML
1 INJECTION INTRAMUSCULAR; INTRAVENOUS ONCE AS NEEDED
Status: DISCONTINUED | OUTPATIENT
Start: 2022-09-08 | End: 2022-09-13 | Stop reason: HOSPADM

## 2022-09-08 RX ORDER — HYDROXYZINE HYDROCHLORIDE 25 MG/1
25 TABLET, FILM COATED ORAL 3 TIMES DAILY PRN
Status: DISCONTINUED | OUTPATIENT
Start: 2022-09-08 | End: 2022-09-13 | Stop reason: HOSPADM

## 2022-09-08 RX ORDER — BENZONATATE 100 MG/1
100 CAPSULE ORAL 3 TIMES DAILY PRN
Status: DISCONTINUED | OUTPATIENT
Start: 2022-09-08 | End: 2022-09-13 | Stop reason: HOSPADM

## 2022-09-08 RX ORDER — IBUPROFEN 400 MG/1
400 TABLET ORAL EVERY 6 HOURS PRN
Status: DISCONTINUED | OUTPATIENT
Start: 2022-09-08 | End: 2022-09-13 | Stop reason: HOSPADM

## 2022-09-08 RX ORDER — FLUOXETINE HYDROCHLORIDE 20 MG/1
40 CAPSULE ORAL DAILY
Status: DISCONTINUED | OUTPATIENT
Start: 2022-09-09 | End: 2022-09-13 | Stop reason: HOSPADM

## 2022-09-08 RX ORDER — ECHINACEA PURPUREA EXTRACT 125 MG
2 TABLET ORAL AS NEEDED
Status: DISCONTINUED | OUTPATIENT
Start: 2022-09-08 | End: 2022-09-13 | Stop reason: HOSPADM

## 2022-09-08 NOTE — NURSING NOTE
Due to patient issue on floor unable to give report at this time. Will give report once issue resolved.

## 2022-09-08 NOTE — NURSING NOTE
"Assessment completed at this time.    Pt presents to intake via EMS. States she is suicidal and has a plan, to take a knife and cut herself.    States she had self aborted attempt today with  knife.      States precipitating factors are \"I dont have my babies with me they were taken away from me\"  Says she doesn't want to kill herself over her babies she \"just dont know\"    Pt is very withdrawn, appears sad and hopeless, no eye contact, anxious, keeping head down including when walking.    Morenita Templetonmore - [phone] (grandmother)    Denies any substance abuse.    Denies HI, or AVH.    Admits to SI with plan and intent with attempt today.    \"My mom passed away in my arms 8-9 years ago.\"    Anxiety- 10/10  Depression 10/10      "

## 2022-09-08 NOTE — NURSING NOTE
Patient arrived to intake by ems stretcher  [facility]. Patient to intake.pockets emptied and search completed with two staff members present. Items logged and placed in cabinet in intake area. Pt placed in safe room for evaluation. Will continue to monitor pt status. Waiting for ED provider to clear pt medically. Patient educated on the need to wear mask at all times while in the intake area for their safety and the safety of others.     25 dollars in cash sent to [employer].

## 2022-09-08 NOTE — NURSING NOTE
Spoke to doctor Philippe intake information labs and V/S provided and discussed with provider, instructed to admit with routine SP3 orders [id number]. Patient and ER provider made aware of admitting orders and plan of care.

## 2022-09-08 NOTE — NURSING NOTE
"Patient is a poor historian, she reports difficulty in remembering and poor comprehension. She had treatment at this facility in July 2022 x 4 days and [facility]. She is unable to state how long she was there. She reported in the ED of having suicidal thoughts to cut self with a  knife. She currently denies suicidal thoughts and would like help getting into [facility] to get help \"getting back on my feet\". She has poor eye contact during the interview. she reports feeling worthless with axniety and depression rated at 10/10. She has been living with her grandmother, she has two girl who are living with their other grandmother. She reports sadness about not being able to see them. She has not had anything to eat today but reports good appetite and reports no problems with sleep. She denies A/V hallucinations. She does not have a DL reporting \"I'm not smart enought to pass the test\".  "

## 2022-09-08 NOTE — NURSING NOTE
Asked patient about positive benzo result on UDS, patient states she only takes ibuprofen and hydroxyzine that was prescribed from the hospital. Denies benzo use.

## 2022-09-08 NOTE — ED PROVIDER NOTES
Subjective   PIT    26-year-old white female presents secondary to depression with suicidal ideation.  Patient states that she had a  knife and was going to kill herself earlier but decided not to.  She states she is never gone at this point before.  She has been under a lot of stress.  She states this makes things worse.  Her symptoms are severe.  No other relieving or exacerbating factors.  No exposure to COVID or COVID symptoms.  No other complaints this time.          Review of Systems   Constitutional: Negative.  Negative for fever.   HENT: Negative.    Respiratory: Negative.    Cardiovascular: Negative.  Negative for chest pain.   Gastrointestinal: Negative.  Negative for abdominal pain.   Endocrine: Negative.    Genitourinary: Negative.  Negative for dysuria.   Skin: Negative.    Neurological: Negative.    Psychiatric/Behavioral: Positive for suicidal ideas.   All other systems reviewed and are negative.      Past Medical History:   Diagnosis Date   • Anxiety    • Brain tumor (HCC)    • Depression    • Suicidal intent    • Suicide attempt (HCC)        Allergies   Allergen Reactions   • Augmentin [Amoxicillin-Pot Clavulanate] Anaphylaxis and Hives   • Latex Anaphylaxis and Hives   • Phenergan [Promethazine] Anaphylaxis and Hives   • Seroquel [Quetiapine] Anaphylaxis and Hives   • Trazodone Unknown (See Comments)     Seizures        Past Surgical History:   Procedure Laterality Date   • TONSILLECTOMY         No family history on file.    Social History     Socioeconomic History   • Marital status: Single   Tobacco Use   • Smoking status: Current Every Day Smoker     Years: 1.00     Types: Cigarettes   Substance and Sexual Activity   • Alcohol use: Not Currently   • Drug use: Yes     Types: Methamphetamines     Comment: suboxone   • Sexual activity: Defer           Objective   Physical Exam  Vitals and nursing note reviewed.   Constitutional:       General: She is not in acute distress.     Appearance:  She is well-developed. She is not diaphoretic.   HENT:      Head: Normocephalic and atraumatic.      Right Ear: External ear normal.      Left Ear: External ear normal.      Nose: Nose normal.   Eyes:      Conjunctiva/sclera: Conjunctivae normal.      Pupils: Pupils are equal, round, and reactive to light.   Neck:      Vascular: No JVD.      Trachea: No tracheal deviation.   Cardiovascular:      Rate and Rhythm: Normal rate and regular rhythm.      Heart sounds: Normal heart sounds. No murmur heard.  Pulmonary:      Effort: Pulmonary effort is normal. No respiratory distress.      Breath sounds: Normal breath sounds. No wheezing.   Abdominal:      General: Bowel sounds are normal.      Palpations: Abdomen is soft.      Tenderness: There is no abdominal tenderness.   Musculoskeletal:         General: No deformity. Normal range of motion.      Cervical back: Normal range of motion and neck supple.   Skin:     General: Skin is warm and dry.      Coloration: Skin is not pale.      Findings: No erythema or rash.   Neurological:      Mental Status: She is alert and oriented to person, place, and time.      Cranial Nerves: No cranial nerve deficit.   Psychiatric:         Behavior: Behavior normal.         Thought Content: Thought content includes suicidal ideation. Thought content includes suicidal plan.         Procedures           ED Course                                           MDM  Number of Diagnoses or Management Options  Depression with suicidal ideation: new and requires workup     Amount and/or Complexity of Data Reviewed  Clinical lab tests: ordered and reviewed        Final diagnoses:   Depression with suicidal ideation       ED Disposition  ED Disposition     ED Disposition   DC/Transfer to Behavioral Health    Condition   Stable    Comment   --             No follow-up provider specified.       Medication List      No changes were made to your prescriptions during this visit.          Jose Cooper,  PA  09/08/22 1822

## 2022-09-09 PROCEDURE — 99223 1ST HOSP IP/OBS HIGH 75: CPT | Performed by: PSYCHIATRY & NEUROLOGY

## 2022-09-09 RX ORDER — OLANZAPINE 5 MG/1
5 TABLET ORAL NIGHTLY
Status: DISCONTINUED | OUTPATIENT
Start: 2022-09-09 | End: 2022-09-12

## 2022-09-09 RX ADMIN — OLANZAPINE 5 MG: 5 TABLET, FILM COATED ORAL at 20:27

## 2022-09-09 RX ADMIN — FLUOXETINE HYDROCHLORIDE 40 MG: 20 CAPSULE ORAL at 08:16

## 2022-09-09 NOTE — PLAN OF CARE
Goal Outcome Evaluation:               Patient came in today from the emergency room admission completed by admission nurse Gaby. Patient denies thoughts of harming self and others.    no

## 2022-09-09 NOTE — H&P
INITIAL PSYCHIATRIC HISTORY & PHYSICAL    Patient Identification:  Name:  Anali Das  Age:  26 y.o.  Sex:  female  :  1995  MRN:  [id number]   Visit Number:  [id number]  Primary Care Physician:  Provider, No Known    SUBJECTIVE    CC/Focus of Exam: SI with a plan    HPI: Anali Das is a 26 y.o. female who was admitted on 2022 with complaints of depression & SI with a plan to use a  knife.    Patient reports worsening depression, with symptoms of low mood, low energy, low motivation, poor concentration, high anxiety, anhedonia, hopelessness, worthlessness, insomnia, and SI.  Symptoms are severe, persistent, present multiple settings, worse in the last month, worse by interpersonal stressors, improved by nothing.  Patient disorganized at times during the assessment, exhibiting thought blocking, response lag, poor concentration.    Significant stressors include separation from her children, lack of transportation to get/maintain employment, child support debts, mental health.    PAST PSYCHIATRIC HX:  Dx: depression, substance abuse  IP: Multiple previous psychiatric hospitalizations, last at [facility] [place] [facility] from 2022 through 2022.  Other admissions at [facility]  OP: denies; had been scheduled with [facility]  Current meds: Fluoxetine 40 mg daily,  hydroxyzine 25 mg 3 times daily as needed  Previous meds: Risperidone, doxepin, aripiprazole 10 mg daily  SH/SI/SA: hx of cutting/intermittent/reports attempting one time prior to this admission  Trauma: mother passed away 8-9 years ago, lost kids soon after they were born    SUBSTANCE USE HX:  History of methamphetamine abuse. Reports recently completing rehab at [facility]. Prior to that, left [facility] at some point in treatment.  Admission UDS +bzo; pt denies recent use    SOCIAL HX:  Born and raised in [place], [place].  Stays between family and friends.  She is a high school  graduate.   She is  but  from her  for several years now. She has 2 children, ages 7 and 8, that were removed from her custody shortly after their birth due to her drug use.  The children are in the custody of her mother-in-law.  Family contact: Morenita Carrizales: [phone] (grandmother)  History of legal problems related to failure to pay child support  Currently unemployed, reports she has no transportation to keep a job. No income.    FAMILY HX:  None known to patient    Past Medical History:   Diagnosis Date   • Anxiety    • Brain tumor (HCC)    • Depression    • Substance abuse (HCC)    • Suicidal intent    • Suicide attempt (HCC)        Past Surgical History:   Procedure Laterality Date   • TONSILLECTOMY         Medications Prior to Admission   Medication Sig Dispense Refill Last Dose   • FLUoxetine (PROzac) 40 MG capsule Take 1 capsule by mouth Daily. Indications: Depression 30 capsule 0 9/8/2022 at Unknown time   • hydrOXYzine pamoate (VISTARIL) 25 MG capsule Take 1 capsule by mouth 3 (Three) Times a Day As Needed for Anxiety. Indications: Feeling Anxious 30 capsule 0 9/7/2022 at Unknown time         ALLERGIES:  Augmentin [amoxicillin-pot clavulanate], Latex, Phenergan [promethazine], Seroquel [quetiapine], and Trazodone    Temp:  [98.1 °F (36.7 °C)-98.8 °F (37.1 °C)] 98.8 °F (37.1 °C)  Heart Rate:  [60-78] 64  Resp:  [16-18] 18  BP: (106-122)/(74-78) 122/76    REVIEW OF SYSTEMS:  Review of Systems   Psychiatric/Behavioral: Positive for dysphoric mood, hallucinations, self-injury, sleep disturbance and suicidal ideas. The patient is nervous/anxious.    All other systems reviewed and are negative.       OBJECTIVE    PHYSICAL EXAM:  Physical Exam  Vitals and nursing note reviewed.   Constitutional:       Appearance: She is well-developed.   HENT:      Head: Normocephalic and atraumatic.      Right Ear: External ear normal.      Left Ear: External ear normal.      Nose: Nose normal.    Eyes:      Pupils: Pupils are equal, round, and reactive to light.   Pulmonary:      Effort: Pulmonary effort is normal. No respiratory distress.      Breath sounds: Normal breath sounds.   Abdominal:      General: There is no distension.      Palpations: Abdomen is soft.   Musculoskeletal:         General: No deformity. Normal range of motion.      Cervical back: Normal range of motion and neck supple.   Skin:     General: Skin is warm.      Findings: No rash.   Neurological:      Mental Status: She is alert and oriented to person, place, and time.      Coordination: Coordination normal.         MENTAL STATUS EXAM:   Hygiene:   fair  Cooperation:  Guarded  Eye Contact:  Downcast  Psychomotor Behavior:  Restless  Affect:  Restricted  Hopelessness: 9  Speech:  Monotone  Thought Process: Disorganized  Thought Content:  Mood congruent  Suicidal:  Suicidal Ideation, Suicidal plan and Death wish  Homicidal:  None  Hallucinations:  Not demonstrated today  Delusion:  None  Memory:  Deficits  Orientation:  Person, Place and Situation  Reliability:  poor  Insight:  Poor  Judgment:  Poor  Impulse Control:  Poor      Imaging Results (Last 24 Hours)     ** No results found for the last 24 hours. **           Lab Results   Component Value Date    GLUCOSE 94 09/08/2022    BUN 16 09/08/2022    CREATININE 0.76 09/08/2022    EGFRIFNONA >60 07/08/2022    EGFRIFAFRI >60 07/08/2022    BCR 21.1 09/08/2022    CO2 22.2 09/08/2022    CALCIUM 9.2 09/08/2022    ALBUMIN 4.36 09/08/2022    AST 28 09/08/2022    ALT 40 (H) 09/08/2022       Lab Results   Component Value Date    WBC 7.26 09/08/2022    HGB 14.1 09/08/2022    HCT 41.5 09/08/2022    MCV 91.4 09/08/2022     09/08/2022       ECG/EMG Results (most recent)     Procedure Component Value Units Date/Time    ECG 12 Lead [[id number]] Collected: 09/08/22 2233     Updated: 09/08/22 2307     QT Interval 402 ms      QTC Interval 430 ms     Narrative:      Test Reason : Baseline Cardiac  Status  Blood Pressure :   */*   mmHG  Vent. Rate :  69 BPM     Atrial Rate :  69 BPM     P-R Int : 214 ms          QRS Dur : 116 ms      QT Int : 402 ms       P-R-T Axes :  53  59  65 degrees     QTc Int : 430 ms    Sinus rhythm with 1st degree AV block  Otherwise normal ECG  When compared with ECG of 08-SEP-2022 22:33, (Unconfirmed)  No significant change was found    Referred By:            Confirmed By:            Brief Urine Lab Results  (Last result in the past 365 days)      Color   Clarity   Blood   Leuk Est   Nitrite   Protein   CREAT   Urine HCG        09/08/22 1808 Dark Yellow   Cloudy   Negative   Negative   Negative   Trace                 Last Urine Toxicity     LAST URINE TOXICITY RESULTS Latest Ref Rng & Units 9/8/2022 7/21/2022    AMPHETAMINES SCREEN, URINE Negative Negative -    BARBITURATES SCREEN Negative Negative Positive(A)    BENZODIAZEPINE SCREEN, URINE Negative Positive(A) Negative    BUPRENORPHINEUR Negative Negative -    COCAINE SCREEN, URINE Negative Negative Negative    FENTANYL URINE QT Negative - -    HYDROMORPHONE <5 ng/mL - -    METHADONE SCREEN, URINE Negative Negative Negative    METHAMPHETAMINEUR Negative Negative -          Chart, notes, vitals, labs personally reviewed.  [facility] report requested, reviewed, no controlled meds filled in [place] over the last year  UDS results:  + Benzo  EKG tracing personally reviewed, interpreted as sinus rhythm, no acute changes, QTc interval 430  Consulted with patient's therapist regarding clinical history and treatment plan    ASSESSMENT & PLAN:    Suicidal Ideation  -SI with plan  -Admit for crisis stabilization  -SP3    Major depressive disorder, severe, recurrent, with psychosis  -Continue fluoxetine 40 mg daily  -Begin olanzapine 5 mg nightly.  We will titrate and switch to Lybalvi prior to discharge  -We will establish outpatient psychiatric care following hospitalization.  Patient may be a good candidate for case management  given her psychiatric and social situation    Methamphetamine use disorder, severe, dependence  -Admission UDS negative  -Patient denies use in the last 2 months since completing rehab at [facility]  -We will consider sober living options    Brain tumor  -Per chart review  -Previously followed with a neurologist in [place].  We will ascertain follow-up care    The patient has been admitted for safety and stabilization.  Patient will be monitored for suicidality daily and maintained on Special Precautions Level 3 (q15 min checks) .  The patient will have individual and group therapy with a master's level therapist. A master treatment plan will be developed and agreed upon by the patient and his/her treatment team.  The patient's estimated length of stay in the hospital is 5-7 days.

## 2022-09-09 NOTE — PROGRESS NOTES
"1320:    DATA:      Therapist met individually with patient this date to introduce role and to discuss hospitalization expectations. Patient agreeable. Reviewed medical record and staffed case with treatment team this date. No major issues identified.       Clinical Maneuvering/Intervention:     Therapist assisted patient in processing above session content; acknowledged and normalized patient’s thoughts, feelings, and concerns.  Discussed the therapist/patient relationship and explain the parameters and limitations of relative confidentiality.  Also discussed the importance of active participation, and honesty to the treatment process.  Encouraged the patient to discuss/vent their feelings, frustrations, and fears concerning their ongoing medical issues and validated their feelings.     Allowed patient to freely discuss issues without interruption or judgment. Provided safe, confidential environment to facilitate the development of positive therapeutic relationship and encourage open, honest communication.      Therapist addressed discharge safety planning this date. Assisted patient in identifying risk factors which would indicate the need for higher level of care after discharge;  including thoughts to harm self or others and/or self-harming behavior. Encouraged patient to call 911, or present to the nearest emergency room should any of these events occur. Discussed crisis intervention services and means to access.  Encouraged securing any objects of harm.       Therapist completed integrated summary, treatment plan, and initiated social history this date.  Therapist is strongly encouraging family involvement in treatment.       ASSESSMENT:      The patient is a 26 year old  female. Per report, \"Anali aDs is a 26 y.o. female who was admitted on 9/8/2022 with complaints of depression & SI with a plan to use a  knife. Patient reports worsening depression, with symptoms of low mood, low energy, " "low motivation, poor concentration, high anxiety, anhedonia, hopelessness, worthlessness, insomnia, and SI.  Symptoms are severe, persistent, present multiple settings, worse in the last month, worse by interpersonal stressors, improved by nothing.  Patient disorganized at times during the assessment, exhibiting thought blocking, response lag, poor concentration. Significant stressors include separation from her children, lack of transportation to get/maintain employment, child support debts, mental health.\"       Today, patient was seen 1-1 at bedside.  Patient calm and cooperative and familiar to therapist due to last hospitalization. Patient reports that she has been under stress. She reports that she was last living with her mamaw in [facility] and is not allowed to return there. She reports that she wants to go to a half way house or sober living. She denies recent drug use.  Patient has poor eye contact and at times appears to be thought blocking.  When therapist left room, therapist could hear patient respond to internal stimuli by stating, \"I know she's so nice to us.\"  Patient signed for [facility].      Goals for treatment: SI      Prior Hospitalizations / Dates: 2 at [facility] 2022, multiple admissions at other hospitals including [facility]      Childhood History:  Born and raised in [facility].      Suicide Attempts:  History of cutting     Alcohol:  denies     Substance use: UDS + for benzodiazepines. Patient denies.     Legal:  delinquent child support    Support system:  Friends     Spiritual:  God      Sexual:  , .  2 children ages 7 and 8 who are in custody of mother in law.         Education:   12th grade educated, unemployed.      Access to firearms:  denies       PLAN:       Patient to remain hospitalized this date.      Treatment team will focus efforts on stabilizing patient's acute symptoms while providing education on healthy coping and " crisis management to reduce hospitalizations.   Patient requires daily psychiatrist evaluation and 24/7 nursing supervision to promote patient  safety.     Therapist will offer 1-4 individual sessions, family education, and appropriate referral.     Therapist recommends continued assessment. Patient seeking sober living and has signed for [facility]. If they do not accept females, we will ask who they recommend.  Patient reports that she does not use drugs currently and would not meet criteria for rehab.

## 2022-09-09 NOTE — PROGRESS NOTES
1400: Navigator is helping Primary Therapist with discharge planning for patient. Navigator attempted to reach Lai with [facility]. No answer at this time.     [facility] - [phone]

## 2022-09-09 NOTE — PLAN OF CARE
Problem: Adult Behavioral Health Plan of Care  Goal: Plan of Care Review  Outcome: Ongoing, Progressing  Flowsheets (Taken 9/9/2022 1345)  Consent Given to Review Plan with: declines  Progress: no change  Plan of Care Reviewed With: patient  Patient Agreement with Plan of Care: agrees  Outcome Evaluation: New admit. Completed social history and integrated summary  Goal: Patient-Specific Goal (Individualization)  Outcome: Ongoing, Progressing  Flowsheets  Taken 9/9/2022 1345 by Lily Chacon  Patient-Specific Goals (Include Timeframe): Patient will deny SI/HI prior to dischage. Patient will identify 1 healthy coping skill for stress prior to discharge. Patient will consent to appropriate aftercare plan prior to discharge.  Individualized Care Needs: Therapist will offer 1-4 individual sessions, family education, aftercare planning  Taken 9/9/2022 1338 by Lily Chacon  Patient Personal Strengths:   expressive of emotions   expressive of needs   motivated for recovery   motivated for treatment   positive educational history   realistic evaluation of current/future capabilities   resilient   resourceful   spiritual/Sabianist support  Patient Vulnerabilities:   adverse childhood experience(s)   housing insecurity   limited support system   substance abuse/addiction   traumatic event   occupational insecurity   lacks insight into illness   family/relationship conflict   legal concerns   history of unsuccessful treatment   food insecurity  Taken 9/8/2022 1930 by Gaby Gonzalez RN  Anxieties, Fears or Concerns: none reported  Goal: Optimized Coping Skills in Response to Life Stressors  Outcome: Ongoing, Progressing  Intervention: Promote Effective Coping Strategies  Flowsheets (Taken 9/9/2022 1345)  Supportive Measures:   active listening utilized   counseling provided  Goal: Develops/Participates in Therapeutic [place] to Support Successful Transition  Outcome: Ongoing,  Progressing  Intervention: Foster Therapeutic [place]  Flowsheets (Taken 9/9/2022 1120 by Delores Rojas, RN)  Trust Relationship/Rapport:   thoughts/feelings acknowledged   reassurance provided   empathic listening provided  Intervention: Mutually Develop Transition Plan  Flowsheets  Taken 9/9/2022 1345  Outpatient/Agency/Support Group Needs:   outpatient medication management   outpatient psychiatric care (specify)   outpatient counseling   outpatient substance abuse treatment (specify)  Transition Support:   community resources reviewed   crisis management plan verbalized   follow-up care coordinated   crisis management plan promoted   follow-up care discussed  Anticipated Discharge Disposition: (sober living) other (see comments)  Taken 9/9/2022 1342  Discharge Coordination/Progress: Patient has [employer] insurance for discharge planning and signed consent for [facility].  Concerns Comments: NA  Transportation Anticipated: public transportation  Transportation Concerns: no car  Current Discharge Risk:   substance use/abuse   psychiatric illness  Concerns to be Addressed:   mental health   coping/stress   compliance issue   cognitive/perceptual   discharge planning   home safety   substance/tobacco abuse/use   suicidal   medication  Readmission Within the Last 30 Days: current reason for admission unrelated to previous admission  Patient/Family Anticipated Services at Transition:   outpatient care   mental health services     Patient's Choice of Community Agency(s): [facility]  Patient/Family Anticipates Transition to: (sober living) other (see comments)  Offered/Gave Vendor List: no   Goal Outcome Evaluation:  Plan of Care Reviewed With: patient  Patient Agreement with Plan of Care: agrees  Consent Given to Review Plan with: declines  Progress: no change  Outcome Evaluation: New admit. Completed social history and integrated summary

## 2022-09-09 NOTE — PLAN OF CARE
Goal Outcome Evaluation:  Plan of Care Reviewed With: patient  Patient Agreement with Plan of Care: agrees     Progress: improving  Outcome Evaluation: pt. verbalzies slept 6 hours she denies depression denies anxiety denies s/i denies h/i denies a/v/hdeneis w/c for drugs or alcohol pt. staying in room came out to dayroom for lunch she makes poor eye contact guarded manner no interaction noted with peers.

## 2022-09-10 PROCEDURE — 99232 SBSQ HOSP IP/OBS MODERATE 35: CPT | Performed by: PSYCHIATRY & NEUROLOGY

## 2022-09-10 RX ADMIN — HYDROXYZINE HYDROCHLORIDE 50 MG: 50 TABLET, FILM COATED ORAL at 14:52

## 2022-09-10 RX ADMIN — Medication 1 PATCH: at 08:19

## 2022-09-10 RX ADMIN — OLANZAPINE 5 MG: 5 TABLET, FILM COATED ORAL at 20:21

## 2022-09-10 RX ADMIN — FLUOXETINE HYDROCHLORIDE 40 MG: 20 CAPSULE ORAL at 08:19

## 2022-09-10 RX ADMIN — HYDROXYZINE HYDROCHLORIDE 50 MG: 50 TABLET, FILM COATED ORAL at 20:21

## 2022-09-10 NOTE — PROGRESS NOTES
"INPATIENT PSYCHIATRIC PROGRESS NOTE    Name:  Anali Das  :  1995  MRN:  [id number]  Visit Number:  [id number]  Length of stay:  2    SUBJECTIVE    CC/Focus of Exam: depression     INTERVAL HISTORY:  The patient states she is feeling better and is not experiencing depression or anxiety. Also reports no suicidal thoughts.   Depression rating 0/10  Anxiety rating 0/10  Sleep: fair  Withdrawal sx: denies  Cravin/10    Review of Systems   Constitutional: Negative.    Respiratory: Negative.    Cardiovascular: Negative.    Gastrointestinal: Negative.        OBJECTIVE    Temp:  [97.3 °F (36.3 °C)-98 °F (36.7 °C)] 97.3 °F (36.3 °C)  Heart Rate:  [80-84] 84  Resp:  [18] 18  BP: (115-117)/(73-77) 115/73    MENTAL STATUS EXAM:  Appearance:Casually dressed, good hygeine.   Cooperation:Cooperative  Psychomotor: No psychomotor agitation/retardation, No EPS, No motor tics  Speech-normal rate, amount.  Mood \"better\"   Affect-congruent, appropriate, stable  Thought Content-goal directed, no delusional material present  Thought process-linear, organized.  Suicidality: No SI  Homicidality: No HI  Perception: No AH/VH  Insight-fair   Judgement-fair    Lab Results (last 24 hours)     ** No results found for the last 24 hours. **             Imaging Results (Last 24 Hours)     ** No results found for the last 24 hours. **             ECG/EMG Results (most recent)     Procedure Component Value Units Date/Time    ECG 12 Lead [[id number]] Collected: 22     Updated: 22 2307     QT Interval 402 ms      QTC Interval 430 ms     Narrative:      Test Reason : Baseline Cardiac Status  Blood Pressure :   */*   mmHG  Vent. Rate :  69 BPM     Atrial Rate :  69 BPM     P-R Int : 214 ms          QRS Dur : 116 ms      QT Int : 402 ms       P-R-T Axes :  53  59  65 degrees     QTc Int : 430 ms    Sinus rhythm with 1st degree AV block  Otherwise normal ECG  When compared with ECG of 08-SEP-2022 22:33, (Unconfirmed)  No " significant change was found    Referred By:            Confirmed By:            ALLERGIES: Augmentin [amoxicillin-pot clavulanate], Latex, Phenergan [promethazine], Seroquel [quetiapine], and Trazodone      Current Facility-Administered Medications:   •  aluminum-magnesium hydroxide-simethicone (MAALOX MAX) 400-400-40 MG/5ML suspension 15 mL, 15 mL, Oral, Q6H PRN, Augie Philippe MD  •  benzonatate (TESSALON) capsule 100 mg, 100 mg, Oral, TID PRN, Augie Philippe MD  •  benztropine (COGENTIN) tablet 2 mg, 2 mg, Oral, Once PRN **OR** benztropine (COGENTIN) injection 1 mg, 1 mg, Intramuscular, Once PRN, Augie Philippe MD  •  famotidine (PEPCID) tablet 20 mg, 20 mg, Oral, BID PRN, Augie Philippe MD  •  FLUoxetine (PROzac) capsule 40 mg, 40 mg, Oral, Daily, Augie Philippe MD, 40 mg at 09/10/22 0819  •  hydrOXYzine (ATARAX) tablet 25 mg, 25 mg, Oral, TID PRN, Augie Philippe MD  •  hydrOXYzine (ATARAX) tablet 50 mg, 50 mg, Oral, Q6H PRN, Augie Philippe MD, 50 mg at 09/10/22 1452  •  ibuprofen (ADVIL,MOTRIN) tablet 400 mg, 400 mg, Oral, Q6H PRN, Augie Philippe MD  •  loperamide (IMODIUM) capsule 2 mg, 2 mg, Oral, Q2H PRN, Augie Philippe MD  •  magnesium hydroxide (MILK OF MAGNESIA) suspension 10 mL, 10 mL, Oral, Daily PRN, Augie Philippe MD  •  nicotine (NICODERM CQ) 21 MG/24HR patch 1 patch, 1 patch, Transdermal, Q24H, Augie Philippe MD, 1 patch at 09/10/22 0819  •  OLANZapine (zyPREXA) tablet 5 mg, 5 mg, Oral, Nightly, Francis Aldrich MD, 5 mg at 09/09/22 2027  •  ondansetron (ZOFRAN) tablet 4 mg, 4 mg, Oral, Q6H PRN, Augie Philippe MD  •  sodium chloride nasal spray 2 spray, 2 spray, Each Nare, PRN, Augie Philippe MD    ASSESSMENT & PLAN:    Suicidal Ideation  -SI with plan  -Admit for crisis stabilization  -SP3     Major depressive disorder, severe, recurrent, with psychosis  -Continue fluoxetine 40 mg daily  -Began olanzapine 5 mg nightly on 9/9/22.  We will titrate and switch to  Lybalvi prior to discharge  -We will establish outpatient psychiatric care following hospitalization.  Patient may be a good candidate for case management given her psychiatric and social situation     Methamphetamine use disorder, severe, dependence  -Admission UDS negative  -Patient denies use in the last 2 months since completing rehab at [facility]  -We will consider sober living options     Brain tumor  -Per chart review  -Previously followed with a neurologist in [place].  We will ascertain follow-up care    Special precautions: Special Precautions Level 3 (q15 min checks) .    Behavioral Health Treatment Plan and Problem List: I have reviewed and approved the Behavioral Health Treatment Plan and Problem list.  The patient has had a chance to review and agrees with the treatment plan.     Clinician:  Jaya Diaz MD  09/10/22  15:28 EDT

## 2022-09-10 NOTE — PLAN OF CARE
Goal Outcome Evaluation:  Plan of Care Reviewed With: patient  Patient Agreement with Plan of Care: agrees        Pt denies all. She is calm and cooperative with staff, med compliant.

## 2022-09-11 PROCEDURE — 99232 SBSQ HOSP IP/OBS MODERATE 35: CPT | Performed by: PSYCHIATRY & NEUROLOGY

## 2022-09-11 RX ADMIN — HYDROXYZINE HYDROCHLORIDE 50 MG: 50 TABLET, FILM COATED ORAL at 15:25

## 2022-09-11 RX ADMIN — Medication 1 PATCH: at 09:41

## 2022-09-11 RX ADMIN — OLANZAPINE 5 MG: 5 TABLET, FILM COATED ORAL at 20:04

## 2022-09-11 RX ADMIN — FLUOXETINE HYDROCHLORIDE 40 MG: 20 CAPSULE ORAL at 09:41

## 2022-09-11 NOTE — PROGRESS NOTES
"INPATIENT PSYCHIATRIC PROGRESS NOTE    Name:  Anali Das  :  1995  MRN:  [id number]  Visit Number:  [id number]  Length of stay:  3    SUBJECTIVE    CC/Focus of Exam: depression     INTERVAL HISTORY:  The patient states she is feeling good and reports no acute problems. She stays mostly in her room and doesn't interact much with staff or peers.    Depression rating 0/10  Anxiety rating 0/10  Sleep: fair  Withdrawal sx: denies  Cravin/10    Review of Systems   Constitutional: Negative.    Respiratory: Negative.    Cardiovascular: Negative.    Gastrointestinal: Negative.        OBJECTIVE    Temp:  [97.4 °F (36.3 °C)-97.9 °F (36.6 °C)] 97.4 °F (36.3 °C)  Heart Rate:  [70-84] 70  Resp:  [18] 18  BP: (124-127)/(82-83) 124/83    MENTAL STATUS EXAM:  Appearance:Casually dressed, good hygeine.   Cooperation:Cooperative  Psychomotor: No psychomotor agitation/retardation, No EPS, No motor tics  Speech-normal rate, amount.  Mood \"better\"   Affect-congruent, appropriate, stable  Thought Content-goal directed, no delusional material present  Thought process-linear, organized.  Suicidality: No SI  Homicidality: No HI  Perception: No AH/VH  Insight-fair   Judgement-fair    Lab Results (last 24 hours)     ** No results found for the last 24 hours. **             Imaging Results (Last 24 Hours)     ** No results found for the last 24 hours. **             ECG/EMG Results (most recent)     Procedure Component Value Units Date/Time    ECG 12 Lead [[id number]] Collected: 22     Updated: 22     QT Interval 402 ms      QTC Interval 430 ms     Narrative:      Test Reason : Baseline Cardiac Status  Blood Pressure :   */*   mmHG  Vent. Rate :  69 BPM     Atrial Rate :  69 BPM     P-R Int : 214 ms          QRS Dur : 116 ms      QT Int : 402 ms       P-R-T Axes :  53  59  65 degrees     QTc Int : 430 ms    Sinus rhythm with 1st degree AV block  Otherwise normal ECG  When compared with ECG of 08-SEP-2022 " 22:33, (Unconfirmed)  No significant change was found    Referred By:            Confirmed By:            ALLERGIES: Augmentin [amoxicillin-pot clavulanate], Latex, Phenergan [promethazine], Seroquel [quetiapine], and Trazodone      Current Facility-Administered Medications:   •  aluminum-magnesium hydroxide-simethicone (MAALOX MAX) 400-400-40 MG/5ML suspension 15 mL, 15 mL, Oral, Q6H PRN, Augie Philippe MD  •  benzonatate (TESSALON) capsule 100 mg, 100 mg, Oral, TID PRN, Augie Philippe MD  •  benztropine (COGENTIN) tablet 2 mg, 2 mg, Oral, Once PRN **OR** benztropine (COGENTIN) injection 1 mg, 1 mg, Intramuscular, Once PRN, Augie Philippe MD  •  famotidine (PEPCID) tablet 20 mg, 20 mg, Oral, BID PRN, Augie Philippe MD  •  FLUoxetine (PROzac) capsule 40 mg, 40 mg, Oral, Daily, Augie Philippe MD, 40 mg at 09/11/22 0941  •  hydrOXYzine (ATARAX) tablet 25 mg, 25 mg, Oral, TID PRN, Augie Philippe MD  •  hydrOXYzine (ATARAX) tablet 50 mg, 50 mg, Oral, Q6H PRN, Augie Philippe MD, 50 mg at 09/11/22 1525  •  ibuprofen (ADVIL,MOTRIN) tablet 400 mg, 400 mg, Oral, Q6H PRN, Augie Philippe MD  •  loperamide (IMODIUM) capsule 2 mg, 2 mg, Oral, Q2H PRN, Augie Philippe MD  •  magnesium hydroxide (MILK OF MAGNESIA) suspension 10 mL, 10 mL, Oral, Daily PRN, Augie Philippe MD  •  nicotine (NICODERM CQ) 21 MG/24HR patch 1 patch, 1 patch, Transdermal, Q24H, Augie Philippe MD, 1 patch at 09/11/22 0941  •  OLANZapine (zyPREXA) tablet 5 mg, 5 mg, Oral, Nightly, Aldrich, Francis A, MD, 5 mg at 09/10/22 2021  •  ondansetron (ZOFRAN) tablet 4 mg, 4 mg, Oral, Q6H PRN, Augie Philippe MD  •  sodium chloride nasal spray 2 spray, 2 spray, Each Nare, PRN, Augie Philippe MD    ASSESSMENT & PLAN:    Suicidal Ideation  -SI with plan  -Admit for crisis stabilization  -SP3     Major depressive disorder, severe, recurrent, with psychosis  -Continue fluoxetine 40 mg daily  -Began olanzapine 5 mg nightly on 9/9/22.  We  will titrate and switch to Lybalvi prior to discharge  -We will establish outpatient psychiatric care following hospitalization.  Patient may be a good candidate for case management given her psychiatric and social situation     Methamphetamine use disorder, severe, dependence  -Admission UDS negative  -Patient denies use in the last 2 months since completing rehab at [facility]  -We will consider sober living options     Brain tumor  -Per chart review  -Previously followed with a neurologist in [place].  We will ascertain follow-up care    Special precautions: Special Precautions Level 3 (q15 min checks) .    Behavioral Health Treatment Plan and Problem List: I have reviewed and approved the Behavioral Health Treatment Plan and Problem list.  The patient has had a chance to review and agrees with the treatment plan.     Clinician:  Jaya Diaz MD  09/11/22  19:53 EDT

## 2022-09-11 NOTE — PLAN OF CARE
Problem: Adult Behavioral Health Plan of Care  Goal: Plan of Care Review  Outcome: Ongoing, Progressing  Flowsheets  Taken 9/11/2022 1512  Progress: no change  Outcome Evaluation: PATIENT DENIES ANY PROBLEMS THIS SHIFT. PATIENT STILL HAS A BLUNTED AFFECT AND INYERACTS VERY LITTLE WITH OTHERS. PATIENT REMAINS IN HER ROOM MOST OF THE TIME THIS SHIFT.  Taken 9/11/2022 0715  Plan of Care Reviewed With: patient  Patient Agreement with Plan of Care: agrees   Goal Outcome Evaluation:  Plan of Care Reviewed With: patient  Patient Agreement with Plan of Care: agrees     Progress: no change  Outcome Evaluation: PATIENT DENIES ANY PROBLEMS THIS SHIFT. PATIENT STILL HAS A BLUNTED AFFECT AND INYERACTS VERY LITTLE WITH OTHERS. PATIENT REMAINS IN HER ROOM MOST OF THE TIME THIS SHIFT.

## 2022-09-12 LAB
QT INTERVAL: 402 MS
QTC INTERVAL: 430 MS

## 2022-09-12 PROCEDURE — 99232 SBSQ HOSP IP/OBS MODERATE 35: CPT | Performed by: PSYCHIATRY & NEUROLOGY

## 2022-09-12 RX ORDER — OLANZAPINE 10 MG/1
10 TABLET ORAL NIGHTLY
Status: DISCONTINUED | OUTPATIENT
Start: 2022-09-12 | End: 2022-09-13 | Stop reason: HOSPADM

## 2022-09-12 RX ADMIN — HYDROXYZINE HYDROCHLORIDE 50 MG: 50 TABLET, FILM COATED ORAL at 20:31

## 2022-09-12 RX ADMIN — NICOTINE POLACRILEX 4 MG: 2 GUM, CHEWING BUCCAL at 15:48

## 2022-09-12 RX ADMIN — Medication 1 PATCH: at 08:27

## 2022-09-12 RX ADMIN — FLUOXETINE HYDROCHLORIDE 40 MG: 20 CAPSULE ORAL at 08:27

## 2022-09-12 RX ADMIN — IBUPROFEN 400 MG: 400 TABLET, FILM COATED ORAL at 20:31

## 2022-09-12 RX ADMIN — OLANZAPINE 10 MG: 10 TABLET, FILM COATED ORAL at 20:31

## 2022-09-12 NOTE — NURSING NOTE
Patient came outside room cursing and yelling.  Then, patient went back into continuing to curse and hit window with right hand and then wall.  Attempts to redirect by writer with patient not acknowledging attempts.  Lead Nurse came to room and spoke to patient about disturbing milieu of unit.  Lead Nurse was able to redirect patient.  Patient calm and sitting on bed.  Right hand assessed.  Redness on top of hand.  Patient able to move fingers.  Patient denies pain in hand.  Continue to monitor.

## 2022-09-12 NOTE — DISCHARGE PLACEMENT REQUEST
"Karen Das (26 y.o. Female)             Date of Birth   1995    Social Security Number       Address   [place] [address]    Home Phone   [phone]    MRN   [id number]       Episcopal   None    Marital Status   Single                            Admission Date   22    Admission Type   Emergency    Admitting Provider   Augie Philippe MD    Attending Provider   Augie Philippe MD    Department, Room/Bed   [facility] ADULT PSYCHIATRIC, 1022/2S       Discharge Date       Discharge Disposition       Discharge Destination                               Attending Provider: Augie Philippe MD    Allergies: Augmentin [Amoxicillin-pot Clavulanate], Latex, Phenergan [Promethazine], Seroquel [Quetiapine], Trazodone    Isolation: None   Infection: COVID (History) (09/10/22)   Code Status: CPR   Advance Care Planning Activity    Ht: 162.6 cm (64\")   Wt: 81.1 kg (178 lb 12.8 oz)    Admission Cmt: None   Principal Problem: None                Active Insurance as of 2022     Primary Coverage     Payor Plan Insurance Group Employer/Plan Group    [employer] [facility] [employer] [place] [employer]     Payor Plan Address Payor Plan Phone Number Payor Plan Fax Number Effective Dates    [address]   10/1/2020 - None Entered    [facility] [address]       Subscriber Name Subscriber Birth Date Member ID       KAREN DAS 1995 [id number]                 Emergency Contacts          No emergency contacts on file.               History & Physical      Francis Aldrich MD at 22 0765                INITIAL PSYCHIATRIC HISTORY & PHYSICAL    Patient Identification:  Name:  Karen Das  Age:  26 y.o.  Sex:  female  :  1995  MRN:  [id number]   Visit Number:  [id number]  Primary Care Physician:  Provider, No Known    SUBJECTIVE    CC/Focus of Exam: SI with a plan    HPI: Karen Das is a 26 y.o. female who was admitted on 2022 with complaints of depression & " SI with a plan to use a  knife.    Patient reports worsening depression, with symptoms of low mood, low energy, low motivation, poor concentration, high anxiety, anhedonia, hopelessness, worthlessness, insomnia, and SI.  Symptoms are severe, persistent, present multiple settings, worse in the last month, worse by interpersonal stressors, improved by nothing.  Patient disorganized at times during the assessment, exhibiting thought blocking, response lag, poor concentration.    Significant stressors include separation from her children, lack of transportation to get/maintain employment, child support debts, mental health.    PAST PSYCHIATRIC HX:  Dx: depression, substance abuse  IP: Multiple previous psychiatric hospitalizations, last at [facility] from 7/14/2022 through 7/18/2022.  Other admissions at [facility]  OP: denies; had been scheduled with Sabiha  Current meds: Fluoxetine 40 mg daily,  hydroxyzine 25 mg 3 times daily as needed  Previous meds: Risperidone, doxepin, aripiprazole 10 mg daily  SH/SI/SA: hx of cutting/intermittent/reports attempting one time prior to this admission  Trauma: mother passed away 8-9 years ago, lost kids soon after they were born    SUBSTANCE USE HX:  History of methamphetamine abuse. Reports recently completing rehab at [facility]. Prior to that, left [facility] at some point in treatment.  Admission UDS +bzo; pt denies recent use    SOCIAL HX:  Born and raised in [place], [place].  Stays between family and friends.  She is a high school graduate.   She is  but  from her  for several years now. She has 2 children, ages 7 and 8, that were removed from her custody shortly after their birth due to her drug use.  The children are in the custody of her mother-in-law.  Family contact: Morenita Carrizales: [phone] (grandmother)  History of legal problems related to failure to pay child support  Currently  unemployed, reports she has no transportation to keep a job. No income.    FAMILY HX:  None known to patient    Past Medical History:   Diagnosis Date   • Anxiety    • Brain tumor (HCC)    • Depression    • Substance abuse (HCC)    • Suicidal intent    • Suicide attempt (HCC)        Past Surgical History:   Procedure Laterality Date   • TONSILLECTOMY         Medications Prior to Admission   Medication Sig Dispense Refill Last Dose   • FLUoxetine (PROzac) 40 MG capsule Take 1 capsule by mouth Daily. Indications: Depression 30 capsule 0 9/8/2022 at Unknown time   • hydrOXYzine pamoate (VISTARIL) 25 MG capsule Take 1 capsule by mouth 3 (Three) Times a Day As Needed for Anxiety. Indications: Feeling Anxious 30 capsule 0 9/7/2022 at Unknown time         ALLERGIES:  Augmentin [amoxicillin-pot clavulanate], Latex, Phenergan [promethazine], Seroquel [quetiapine], and Trazodone    Temp:  [98.1 °F (36.7 °C)-98.8 °F (37.1 °C)] 98.8 °F (37.1 °C)  Heart Rate:  [60-78] 64  Resp:  [16-18] 18  BP: (106-122)/(74-78) 122/76    REVIEW OF SYSTEMS:  Review of Systems   Psychiatric/Behavioral: Positive for dysphoric mood, hallucinations, self-injury, sleep disturbance and suicidal ideas. The patient is nervous/anxious.    All other systems reviewed and are negative.       OBJECTIVE    PHYSICAL EXAM:  Physical Exam  Vitals and nursing note reviewed.   Constitutional:       Appearance: She is well-developed.   HENT:      Head: Normocephalic and atraumatic.      Right Ear: External ear normal.      Left Ear: External ear normal.      Nose: Nose normal.   Eyes:      Pupils: Pupils are equal, round, and reactive to light.   Pulmonary:      Effort: Pulmonary effort is normal. No respiratory distress.      Breath sounds: Normal breath sounds.   Abdominal:      General: There is no distension.      Palpations: Abdomen is soft.   Musculoskeletal:         General: No deformity. Normal range of motion.      Cervical back: Normal range of motion and  neck supple.   Skin:     General: Skin is warm.      Findings: No rash.   Neurological:      Mental Status: She is alert and oriented to person, place, and time.      Coordination: Coordination normal.         MENTAL STATUS EXAM:   Hygiene:   fair  Cooperation:  Guarded  Eye Contact:  Downcast  Psychomotor Behavior:  Restless  Affect:  Restricted  Hopelessness: 9  Speech:  Monotone  Thought Process: Disorganized  Thought Content:  Mood congruent  Suicidal:  Suicidal Ideation, Suicidal plan and Death wish  Homicidal:  None  Hallucinations:  Not demonstrated today  Delusion:  None  Memory:  Deficits  Orientation:  Person, Place and Situation  Reliability:  poor  Insight:  Poor  Judgment:  Poor  Impulse Control:  Poor      Imaging Results (Last 24 Hours)     ** No results found for the last 24 hours. **           Lab Results   Component Value Date    GLUCOSE 94 09/08/2022    BUN 16 09/08/2022    CREATININE 0.76 09/08/2022    EGFRIFNONA >60 07/08/2022    EGFRIFAFRI >60 07/08/2022    BCR 21.1 09/08/2022    CO2 22.2 09/08/2022    CALCIUM 9.2 09/08/2022    ALBUMIN 4.36 09/08/2022    AST 28 09/08/2022    ALT 40 (H) 09/08/2022       Lab Results   Component Value Date    WBC 7.26 09/08/2022    HGB 14.1 09/08/2022    HCT 41.5 09/08/2022    MCV 91.4 09/08/2022     09/08/2022       ECG/EMG Results (most recent)     Procedure Component Value Units Date/Time    ECG 12 Lead [[id number]] Collected: 09/08/22 2233     Updated: 09/08/22 2307     QT Interval 402 ms      QTC Interval 430 ms     Narrative:      Test Reason : Baseline Cardiac Status  Blood Pressure :   */*   mmHG  Vent. Rate :  69 BPM     Atrial Rate :  69 BPM     P-R Int : 214 ms          QRS Dur : 116 ms      QT Int : 402 ms       P-R-T Axes :  53  59  65 degrees     QTc Int : 430 ms    Sinus rhythm with 1st degree AV block  Otherwise normal ECG  When compared with ECG of 08-SEP-2022 22:33, (Unconfirmed)  No significant change was found    Referred By:             Confirmed By:            Brief Urine Lab Results  (Last result in the past 365 days)      Color   Clarity   Blood   Leuk Est   Nitrite   Protein   CREAT   Urine HCG        09/08/22 1808 Dark Yellow   Cloudy   Negative   Negative   Negative   Trace                 Last Urine Toxicity     LAST URINE TOXICITY RESULTS Latest Ref Rng & Units 9/8/2022 7/21/2022    AMPHETAMINES SCREEN, URINE Negative Negative -    BARBITURATES SCREEN Negative Negative Positive(A)    BENZODIAZEPINE SCREEN, URINE Negative Positive(A) Negative    BUPRENORPHINEUR Negative Negative -    COCAINE SCREEN, URINE Negative Negative Negative    FENTANYL URINE QT Negative - -    HYDROMORPHONE <5 ng/mL - -    METHADONE SCREEN, URINE Negative Negative Negative    METHAMPHETAMINEUR Negative Negative -          Chart, notes, vitals, labs personally reviewed.  Outside [facility] report requested, reviewed, no controlled meds filled in [place] over the last year  UDS results:  + Benzo  EKG tracing personally reviewed, interpreted as sinus rhythm, no acute changes, QTc interval 430  Consulted with patient's therapist regarding clinical history and treatment plan    ASSESSMENT & PLAN:    Suicidal Ideation  -SI with plan  -Admit for crisis stabilization  -SP3    Major depressive disorder, severe, recurrent, with psychosis  -Continue fluoxetine 40 mg daily  -Begin olanzapine 5 mg nightly.  We will titrate and switch to Lybalvi prior to discharge  -We will establish outpatient psychiatric care following hospitalization.  Patient may be a good candidate for case management given her psychiatric and social situation    Methamphetamine use disorder, severe, dependence  -Admission UDS negative  -Patient denies use in the last 2 months since completing rehab at [facility]  -We will consider sober living options    Brain tumor  -Per chart review  -Previously followed with a neurologist in [place].  We will ascertain follow-up care    The patient has been admitted for  "safety and stabilization.  Patient will be monitored for suicidality daily and maintained on Special Precautions Level 3 (q15 min checks) .  The patient will have individual and group therapy with a master's level therapist. A master treatment plan will be developed and agreed upon by the patient and his/her treatment team.  The patient's estimated length of stay in the hospital is 5-7 days.       Electronically signed by Francis Aldrich MD at 22 0809          Physician Progress Notes (last 24 hours)      Jaya Diaz MD at 22 195          INPATIENT PSYCHIATRIC PROGRESS NOTE    Name:  Anali Das  :  1995  MRN:  [id number]  Visit Number:  [id number]  Length of stay:  3    SUBJECTIVE    CC/Focus of Exam: depression     INTERVAL HISTORY:  The patient states she is feeling good and reports no acute problems. She stays mostly in her room and doesn't interact much with staff or peers.    Depression rating 0/10  Anxiety rating 0/10  Sleep: fair  Withdrawal sx: denies  Cravin/10    Review of Systems   Constitutional: Negative.    Respiratory: Negative.    Cardiovascular: Negative.    Gastrointestinal: Negative.        OBJECTIVE    Temp:  [97.4 °F (36.3 °C)-97.9 °F (36.6 °C)] 97.4 °F (36.3 °C)  Heart Rate:  [70-84] 70  Resp:  [18] 18  BP: (124-127)/(82-83) 124/83    MENTAL STATUS EXAM:  Appearance:Casually dressed, good hygeine.   Cooperation:Cooperative  Psychomotor: No psychomotor agitation/retardation, No EPS, No motor tics  Speech-normal rate, amount.  Mood \"better\"   Affect-congruent, appropriate, stable  Thought Content-goal directed, no delusional material present  Thought process-linear, organized.  Suicidality: No SI  Homicidality: No HI  Perception: No AH/VH  Insight-fair   Judgement-fair    Lab Results (last 24 hours)     ** No results found for the last 24 hours. **             Imaging Results (Last 24 Hours)     ** No results found for the last 24 hours. **             ECG/EMG " Results (most recent)     Procedure Component Value Units Date/Time    ECG 12 Lead [[id number]] Collected: 09/08/22 2233     Updated: 09/08/22 2307     QT Interval 402 ms      QTC Interval 430 ms     Narrative:      Test Reason : Baseline Cardiac Status  Blood Pressure :   */*   mmHG  Vent. Rate :  69 BPM     Atrial Rate :  69 BPM     P-R Int : 214 ms          QRS Dur : 116 ms      QT Int : 402 ms       P-R-T Axes :  53  59  65 degrees     QTc Int : 430 ms    Sinus rhythm with 1st degree AV block  Otherwise normal ECG  When compared with ECG of 08-SEP-2022 22:33, (Unconfirmed)  No significant change was found    Referred By:            Confirmed By:            ALLERGIES: Augmentin [amoxicillin-pot clavulanate], Latex, Phenergan [promethazine], Seroquel [quetiapine], and Trazodone      Current Facility-Administered Medications:   •  aluminum-magnesium hydroxide-simethicone (MAALOX MAX) 400-400-40 MG/5ML suspension 15 mL, 15 mL, Oral, Q6H PRN, Augie Philippe MD  •  benzonatate (TESSALON) capsule 100 mg, 100 mg, Oral, TID PRN, Augie Philippe MD  •  benztropine (COGENTIN) tablet 2 mg, 2 mg, Oral, Once PRN **OR** benztropine (COGENTIN) injection 1 mg, 1 mg, Intramuscular, Once PRN, Augie Philippe MD  •  famotidine (PEPCID) tablet 20 mg, 20 mg, Oral, BID PRN, Augie Philippe MD  •  FLUoxetine (PROzac) capsule 40 mg, 40 mg, Oral, Daily, Augie Philippe MD, 40 mg at 09/11/22 0941  •  hydrOXYzine (ATARAX) tablet 25 mg, 25 mg, Oral, TID PRN, Augie Philippe MD  •  hydrOXYzine (ATARAX) tablet 50 mg, 50 mg, Oral, Q6H PRN, Augie Philippe MD, 50 mg at 09/11/22 1525  •  ibuprofen (ADVIL,MOTRIN) tablet 400 mg, 400 mg, Oral, Q6H PRN, Augie Philippe MD  •  loperamide (IMODIUM) capsule 2 mg, 2 mg, Oral, Q2H PRN, Augie Philippe MD  •  magnesium hydroxide (MILK OF MAGNESIA) suspension 10 mL, 10 mL, Oral, Daily PRN, Augie Philippe MD  •  nicotine (NICODERM CQ) 21 MG/24HR patch 1 patch, 1 patch, Transdermal,  Q24H, Augie Philippe MD, 1 patch at 09/11/22 0941  •  OLANZapine (zyPREXA) tablet 5 mg, 5 mg, Oral, Nightly, Francis Aldrich MD, 5 mg at 09/10/22 2021  •  ondansetron (ZOFRAN) tablet 4 mg, 4 mg, Oral, Q6H PRN, Augie Philippe MD  •  sodium chloride nasal spray 2 spray, 2 spray, Each Nare, PRN, Augie Philippe MD    ASSESSMENT & PLAN:    Suicidal Ideation  -SI with plan  -Admit for crisis stabilization  -SP3     Major depressive disorder, severe, recurrent, with psychosis  -Continue fluoxetine 40 mg daily  -Began olanzapine 5 mg nightly on 9/9/22.  We will titrate and switch to Lybalvi prior to discharge  -We will establish outpatient psychiatric care following hospitalization.  Patient may be a good candidate for case management given her psychiatric and social situation     Methamphetamine use disorder, severe, dependence  -Admission UDS negative  -Patient denies use in the last 2 months since completing rehab at [facility]  -We will consider sober living options     Brain tumor  -Per chart review  -Previously followed with a neurologist in [place].  We will ascertain follow-up care    Special precautions: Special Precautions Level 3 (q15 min checks) .    Behavioral Health Treatment Plan and Problem List: I have reviewed and approved the Behavioral Health Treatment Plan and Problem list.  The patient has had a chance to review and agrees with the treatment plan.     Clinician:  Jaya Diaz MD  09/11/22  19:53 EDT    Electronically signed by Jaya Diaz MD at 09/11/22 1954

## 2022-09-12 NOTE — PROGRESS NOTES
1540:     Patient completed phone screening with [facility], but did not meet criteria for admission due to last use being 2 weeks ago.     Patient contacted Zuleyma at [facility] to do assessment for their sober living facility at [address] [place] [address].   She was accepted there Tuesday or Wednesday but will need transportation. Zuleyma reports that she will send bed letter to navigator Suzanne.

## 2022-09-12 NOTE — PLAN OF CARE
Goal Outcome Evaluation:  Plan of Care Reviewed With: patient  Patient Agreement with Plan of Care: agrees     Progress: no change  Outcome Evaluation: PATIENT DENIES ANY PROBLEMS THIS SHIFT. PATIENTS CONDITION APPEARS UNCHANGED. PATIENT IS AOX3 AND COOPERATIVE WITH NO S/S OF ACUTE DISTRESS OR OUTBURSTS OF ANGER NOTED.

## 2022-09-12 NOTE — PLAN OF CARE
Problem: Adult Behavioral Health Plan of Care  Goal: Optimized Coping Skills in Response to Life Stressors  Outcome: Ongoing, Progressing  Intervention: Promote Effective Coping Strategies  Flowsheets (Taken 9/12/2022 1037)  Supportive Measures:   active listening utilized   counseling provided  Goal: Develops/Participates in Therapeutic Collins to Support Successful Transition  Outcome: Ongoing, Progressing  Intervention: Foster Therapeutic Collins  Flowsheets (Taken 9/12/2022 0813 by Kevin Cole, RN)  Trust Relationship/Rapport:   care explained   choices provided   emotional support provided   empathic listening provided   questions answered   questions encouraged   reassurance provided   thoughts/feelings acknowledged  Intervention: Mutually Develop Transition Plan  Flowsheets (Taken 9/9/2022 1345)  Transition Support:   community resources reviewed   crisis management plan verbalized   follow-up care coordinated   crisis management plan promoted   follow-up care discussed    1035:     DATA:      Therapist met individually with patient this date to introduce role and to discuss hospitalization expectations. Patient agreeable. Reviewed medical record and staffed case with treatment team this date. No major issues identified.       Clinical Maneuvering/Intervention:     Therapist assisted patient in processing above session content; acknowledged and normalized patient’s thoughts, feelings, and concerns.  Discussed the therapist/patient relationship and explain the parameters and limitations of relative confidentiality.  Also discussed the importance of active participation, and honesty to the treatment process.  Encouraged the patient to discuss/vent their feelings, frustrations, and fears concerning their ongoing medical issues and validated their feelings.    Concern was voiced regarding substance use and educated patient on risks associated with use. Patient was advised to abstain from use and educated  on community resources that can help with sobriety and recovery.        Allowed patient to freely discuss issues without interruption or judgment. Provided safe, confidential environment to facilitate the development of positive therapeutic relationship and encourage open, honest communication.      Therapist addressed discharge safety planning this date. Assisted patient in identifying risk factors which would indicate the need for higher level of care after discharge;  including thoughts to harm self or others and/or self-harming behavior. Encouraged patient to call 911, or present to the nearest emergency room should any of these events occur. Discussed crisis intervention services and means to access.  Encouraged securing any objects of harm.       ASSESSMENT:      The patient was seen 1-1 in the office. Patient noted to have very poor eye contact, disheveled appearance. Patient denies SI/HI/AVH.  Patient rates depression/anxiety at 0/10.  Patient reports good sleep and appetite. Patient reports that she would like nicotine gum, craving nicotine.  Patient is future oriented; reports that she just wants to go to a good place and get back on her feet.  Patient signed for [facility] and [facility] this date.         PLAN:       Patient to remain hospitalized this date.      Treatment team will focus efforts on stabilizing patient's acute symptoms while providing education on healthy coping and crisis management to reduce hospitalizations.   Patient requires daily psychiatrist evaluation and 24/7 nursing supervision to promote patient  safety.     Therapist will offer 1-4 individual sessions, family education, and appropriate referral.     Therapist recommends residential or sober living. Patient signed consent for [facility] and [facility] this date.

## 2022-09-12 NOTE — PROGRESS NOTES
"INPATIENT PSYCHIATRIC PROGRESS NOTE    Name:  Anali Das  :  1995  MRN:  [id number]  Visit Number:  [id number]  Length of stay:  4    SUBJECTIVE    CC/Focus of Exam: depression, SI    INTERVAL HISTORY:  Anali had several instances of mood lability, agitation, irritability over the weekend, marked by disruptions in the day room, punching a wall, cursing loudly.  Thought process still appears disorganized, with response lag and thought blocking.  Patient continues to isolate the majority of the time with intermittent episodes of irritability.  We will adjust medication today.    Depression rating 0/10  Anxiety rating 5/10  Sleep: fair  Withdrawal sx: denies  Cravin/10    Review of Systems   Constitutional: Negative.    Respiratory: Negative.    Cardiovascular: Negative.    Gastrointestinal: Negative.    Musculoskeletal: Negative.    Psychiatric/Behavioral: Positive for agitation, dysphoric mood and hallucinations. The patient is nervous/anxious and is hyperactive.        OBJECTIVE    Temp:  [97.3 °F (36.3 °C)-97.5 °F (36.4 °C)] 97.5 °F (36.4 °C)  Heart Rate:  [72-89] 72  Resp:  [18] 18  BP: (122-137)/(81-82) 122/81    MENTAL STATUS EXAM:  Appearance:Casually dressed, good hygeine.   Cooperation: Guarded  Psychomotor: + Psychomotor agitation/retardation, No EPS, No motor tics  Speech-delayed rate, amount.  Mood \"fine\"   Affect-congruent, irritable, labile  Thought Content-goal directed, + delusional material present  Thought process-disorganized  Suicidality: No SI  Homicidality: No HI  Perception: Possible AH/VH  Insight-poor  Judgment-poor    Lab Results (last 24 hours)     ** No results found for the last 24 hours. **             Imaging Results (Last 24 Hours)     ** No results found for the last 24 hours. **             ECG/EMG Results (most recent)     Procedure Component Value Units Date/Time    ECG 12 Lead [[id number]] Collected: 22     Updated: 22 1108     QT Interval 402 ms  "     QTC Interval 430 ms     Narrative:      Test Reason : Baseline Cardiac Status  Blood Pressure :   */*   mmHG  Vent. Rate :  69 BPM     Atrial Rate :  69 BPM     P-R Int : 214 ms          QRS Dur : 116 ms      QT Int : 402 ms       P-R-T Axes :  53  59  65 degrees     QTc Int : 430 ms    Sinus rhythm with 1st degree AV block  Otherwise normal ECG  When compared with ECG of 08-SEP-2022 22:33, (Unconfirmed)  No significant change was found  Confirmed by Maxime York ([id number]) on 9/12/2022 11:01:20 AM    Referred By:            Confirmed By: Maxime York           ALLERGIES: Augmentin [amoxicillin-pot clavulanate], Latex, Phenergan [promethazine], Seroquel [quetiapine], and Trazodone      Current Facility-Administered Medications:   •  aluminum-magnesium hydroxide-simethicone (MAALOX MAX) 400-400-40 MG/5ML suspension 15 mL, 15 mL, Oral, Q6H PRN, Augie Philippe MD  •  benzonatate (TESSALON) capsule 100 mg, 100 mg, Oral, TID PRN, Augie Philippe MD  •  benztropine (COGENTIN) tablet 2 mg, 2 mg, Oral, Once PRN **OR** benztropine (COGENTIN) injection 1 mg, 1 mg, Intramuscular, Once PRN, Augie Philippe MD  •  famotidine (PEPCID) tablet 20 mg, 20 mg, Oral, BID PRN, Augie Philippe MD  •  FLUoxetine (PROzac) capsule 40 mg, 40 mg, Oral, Daily, Augie Philippe MD, 40 mg at 09/12/22 0827  •  hydrOXYzine (ATARAX) tablet 25 mg, 25 mg, Oral, TID PRN, Augie Philippe MD  •  hydrOXYzine (ATARAX) tablet 50 mg, 50 mg, Oral, Q6H PRN, Augie Philippe MD, 50 mg at 09/11/22 1525  •  ibuprofen (ADVIL,MOTRIN) tablet 400 mg, 400 mg, Oral, Q6H PRN, Augie Philippe MD  •  loperamide (IMODIUM) capsule 2 mg, 2 mg, Oral, Q2H PRN, Augie Philippe MD  •  magnesium hydroxide (MILK OF MAGNESIA) suspension 10 mL, 10 mL, Oral, Daily PRN, Augie Philippe MD  •  nicotine polacrilex (NICORETTE) gum 4 mg, 4 mg, Mouth/Throat, Q4H PRN, Francis Aldrich MD  •  OLANZapine (zyPREXA) tablet 10 mg, 10 mg, Oral, Nightly, Bishop  Francis PAYTON MD  •  ondansetron (ZOFRAN) tablet 4 mg, 4 mg, Oral, Q6H PRN, Augie Philippe MD  •  sodium chloride nasal spray 2 spray, 2 spray, Each Nare, PRN, Augie Philippe MD    ASSESSMENT & PLAN:    Suicidal Ideation  -SI with plan  -Admit for crisis stabilization  -SP3     Major depressive disorder, severe, recurrent, with psychosis  -Continue fluoxetine 40 mg daily  -Increase olanzapine to 10 mg nightly.  We will titrate and switch to Lybalvi prior to discharge  -We will establish outpatient psychiatric care following hospitalization.  Patient may be a good candidate for case management given her psychiatric and social situation     Methamphetamine use disorder, severe, dependence  -Admission UDS negative  -Patient denies use in the last 2 months since completing rehab at [facility]  -We will consider sober living options     Brain tumor  -Per chart review  -Previously followed with a neurologist in [place].  We will ascertain follow-up care    Special precautions: Special Precautions Level 3 (q15 min checks) .    Behavioral Health Treatment Plan and Problem List: I have reviewed and approved the Behavioral Health Treatment Plan and Problem list.  The patient has had a chance to review and agrees with the treatment plan.     Clinician:  Francis Aldrich MD  09/12/22  11:27 EDT

## 2022-09-12 NOTE — PLAN OF CARE
Goal Outcome Evaluation:  Plan of Care Reviewed With: patient  Patient Agreement with Plan of Care: agrees        Outcome Evaluation: At the beginning of the shift, patient came out of room cursing and yelling.  Patient then went back to room cursing and hit window and then the wall.  After attempts to redirect patient, lead nurse was able to redirect patient.  Patient cooperative for the rest of shift.

## 2022-09-12 NOTE — PROGRESS NOTES
Navigator is helping Primary Therapist with the following referrals:     [facility] - [phone]  -No female beds. 9/12    [facility] - [phone]  -They have a bed available. Patient needs to call today at 1:00pm to complete phone screening.   -Attempted to reach [facility] so patient could complete phone screening. No answer 9/12  -Transferred call so patient could complete phone screening. 9/12    [facility] - [phone]  -Sent 9/12  -Patient completed screening but does not meet criteria. 9/12

## 2022-09-13 VITALS
WEIGHT: 178.8 LBS | HEIGHT: 64 IN | DIASTOLIC BLOOD PRESSURE: 59 MMHG | HEART RATE: 67 BPM | OXYGEN SATURATION: 97 % | SYSTOLIC BLOOD PRESSURE: 95 MMHG | TEMPERATURE: 97.6 F | BODY MASS INDEX: 30.52 KG/M2 | RESPIRATION RATE: 18 BRPM

## 2022-09-13 PROBLEM — R45.851 SUICIDAL IDEATION: Status: RESOLVED | Noted: 2022-09-08 | Resolved: 2022-09-13

## 2022-09-13 PROBLEM — F15.20 METHAMPHETAMINE USE DISORDER, SEVERE, DEPENDENCE: Status: ACTIVE | Noted: 2022-09-13

## 2022-09-13 PROCEDURE — 99239 HOSP IP/OBS DSCHRG MGMT >30: CPT | Performed by: PSYCHIATRY & NEUROLOGY

## 2022-09-13 RX ORDER — FLUOXETINE HYDROCHLORIDE 40 MG/1
40 CAPSULE ORAL DAILY
Qty: 30 CAPSULE | Refills: 0 | Status: SHIPPED | OUTPATIENT
Start: 2022-09-13 | End: 2022-10-04 | Stop reason: HOSPADM

## 2022-09-13 RX ORDER — HYDROXYZINE PAMOATE 25 MG/1
25 CAPSULE ORAL 3 TIMES DAILY PRN
Qty: 30 CAPSULE | Refills: 0 | Status: SHIPPED | OUTPATIENT
Start: 2022-09-13 | End: 2022-10-04 | Stop reason: HOSPADM

## 2022-09-13 RX ADMIN — FLUOXETINE HYDROCHLORIDE 40 MG: 20 CAPSULE ORAL at 10:07

## 2022-09-13 NOTE — DISCHARGE SUMMARY
"      PSYCHIATRIC DISCHARGE SUMMARY     Patient Identification:  Name:  Anali Das  Age:  27 y.o.  Sex:  female  :  1995  MRN:  [id number]  Visit Number:  [id number]    Date of Admission:2022   Date of Discharge:  2022    Discharge Diagnosis:  Active Problems:    Major depressive disorder, recurrent episode, severe, with psychosis (HCC)    Methamphetamine use disorder, severe, dependence (HCC)      Admission Diagnosis:  Suicidal ideation [R45.851]     Hospital Course  Patient is a 27 y.o. female with a history of depression and reported brain tumor who presented with mood disturbance and SI with a plan.  Admitted for crisis stabilization.  Multiple previous psychiatric hospitalizations.  Admission labs with UDS positive for benzodiazepines.  Continued fluoxetine 40 mg daily.  Started olanzapine, increased to 10 mg nightly and switched to Lybalvi for better metabolic profile.  Patient reported improvement of mood and SI over the course of her stay.  However, she continued to experience intermittent episodes of anxiety, disorganization, thought blocking; some concern for developing psychotic illness.  Patient exhibited no behavior concerning for harm to herself or others.  Patient accepted at local sober living facility.  Outpatient care, including neurology, was established.    On the day of discharge, patient denied SI, HI or AVH. Patient was stable and appropriate by the conclusion of this admission, denying significant symptoms of mood, psychotic or thought disorder. Patient showed improvement of presenting symptoms and was deemed appropriate for discharge today.    Mental Status Exam upon discharge:   Mood \"better\"   Affect-congruent, appropriate, stable  Thought Content-goal directed, no delusional material present  Thought process-linear, organized.  Suicidality: No SI  Homicidality: No HI  Perception: No /    Procedures Performed         Consults:   Consults     No orders found from " 8/10/2022 to 9/9/2022.          Pertinent Test Results:   Lab Results (last 7 days)     ** No results found for the last 168 hours. **          Condition on Discharge:  improved    Vital Signs  Temp:  [97.6 °F (36.4 °C)-97.9 °F (36.6 °C)] 97.6 °F (36.4 °C)  Heart Rate:  [67-94] 67  Resp:  [18] 18  BP: ()/(59-76) 95/59    Discharge Disposition:  Rehab Facility or Unit (DC - External)    Discharge Medications:     Discharge Medications      New Medications      Instructions Start Date   OLANZapine-Samidorphan 10-10 MG tablet   Take 1 tablet by mouth Every Night.         Continue These Medications      Instructions Start Date   FLUoxetine 40 MG capsule  Commonly known as: PROzac   Take 1 capsule by mouth Daily.      hydrOXYzine pamoate 25 MG capsule  Commonly known as: VISTARIL   Take 1 capsule by mouth 3 (Three) Times a Day As Needed for Anxiety.             Discharge Diet: Normal  Diet Instructions    REGULAR DIET           Activity at Discharge: Normal  Activity Instructions    AS TOLERATED           Follow-up Appointments  No future appointments.      Test Results Pending at Discharge  None     Time: I spent greater than 30 minutes on this discharge activity which included: face-to-face encounter with the patient, reviewing the data in the system, coordination of the care with the nursing staff as well as consultants, documentation, and entering orders.      Clinician:   Francis Aldrich MD  09/13/22  09:06 EDT

## 2022-09-13 NOTE — CASE MANAGEMENT/SOCIAL WORK
..Bridge Session  Date: 9/13/2022   Time: 1025    Data:  Reason for Inpatient Admission: SI with plan    Follow up: [facility]  [address] [place] [address] [id number] [phone]    9/13/2022      Coping Skills to Utilize: patient reports she enjoys listening to music, was encouraged to engage in physical activity and engaging her support system    Crisis Safety Plan:  • Support System to utilize and contact numbers: patients grandmother Morenita [phone]    • Educated on crisis hotline numbers (yes/no): Yes    • Was the Patient made aware of contact information for the following: community mental health centers, crisis stabilization programs, residential programs, , etc (yes/no): Yes    Will transportation be a barrier (yes/no): No  • If so, explain solution(s) to resolve barrier: n/a    • How and where will the patient obtain prescribed medications: patients medications were filled today by [facility] and brought to patient.  The cost of the medication was covered by insurance.    Assessment: Patient is denying suicidal ideation today and denying homicidal ideation today.  Patient reports decreased anxiety and depression today.  Patient reports feeling better and ready to attend sober living.    Plan:    Discussed the importance of follow up treatment for continuity of care. The Patient was able to verbalize understanding and commitment to the individualized aftercare and crisis safety plan.      Jelena Beckman LCSW

## 2022-09-13 NOTE — PROGRESS NOTES
0856:    SAFETY/MENTAL HEALTH PLAN    *Assisted patient in identifying risk factors which would indicate the need for higher level of care including thoughts to harm self or others and/or self-harming behavior and encouraged patient to call 911, or present to the nearest emergency room should any of these events occur. Discussed crisis intervention services and means to access.  Patient adamantly and convincingly denies current suicidal or homicidal ideation or perceptual disturbance. Patient denies acute symptoms such as depression/anxiety/avh.  She is medication compliant and denies side affects. We have coordinated with [facility] in [facility] and patient is accepted today.      1. Recognizing warning signs: Warning signs that a crisis may be developing such as thoughts, images, mood, situation, behaviors:    Worse hallucinations     2. Internal coping strategies: Things that the patient can do to take their mind off problems without contacting another person such as relaxation techniques, physical activity, etc:    Music       3. Socialization strategies for distraction and support: People and social settings that provide distraction or support - names or places, telephone:     4. Social contact for assistance in resolving crisis: People the patient can ask for help - names and telephone:    GrandmotherMorenita [phone]      5. Professionals or agencies contacts to help resolve crisis: Professionals or agencies the patient can contact during a crisis - clinician name/location/phone/emergency contact number, local urgent care services with address/phone, National Suicide Prevention Lifeline (714), Emergency contact 911:      988, 911, nearest ED        6. Means restriction: Ways to make the environment safe    Patient is going to sober living and will not have access to firearms or weapons. She is agreeable to call 911 or go to the nearest ED if her symptoms worsen.  Patient signed consent to involve  her grandmother Morenita at [phone]; therapist attempted 3 phone calls, no answer. No voicemail.

## 2022-09-13 NOTE — PLAN OF CARE
Goal Outcome Evaluation:         Pt stated her appetite and sleep was good. Pt rated and anxiety and depression a 10/10. Pt stated she felt hopeless. Pt stated she had no si/hi or avh. Pt was out in the dayroom for snack and phone time. Pt was in her room talking to someone who wasn't there even tho she denied hallucinations. Pt remained in her room and had no other issues or concerns.

## 2022-09-27 ENCOUNTER — HOSPITAL ENCOUNTER (EMERGENCY)
Facility: HOSPITAL | Age: 27
Discharge: PSYCHIATRIC OR SUBSTANCE USE FACILITY | End: 2022-09-27
Attending: STUDENT IN AN ORGANIZED HEALTH CARE EDUCATION/TRAINING PROGRAM | Admitting: STUDENT IN AN ORGANIZED HEALTH CARE EDUCATION/TRAINING PROGRAM

## 2022-09-27 ENCOUNTER — HOSPITAL ENCOUNTER (INPATIENT)
Facility: HOSPITAL | Age: 27
LOS: 7 days | Discharge: NURSING, REHAB OR LONG-TERM CARE | End: 2022-10-04
Attending: PSYCHIATRY & NEUROLOGY | Admitting: PSYCHIATRY & NEUROLOGY

## 2022-09-27 VITALS
TEMPERATURE: 98.1 F | HEIGHT: 64 IN | OXYGEN SATURATION: 99 % | BODY MASS INDEX: 30.73 KG/M2 | SYSTOLIC BLOOD PRESSURE: 111 MMHG | HEART RATE: 70 BPM | DIASTOLIC BLOOD PRESSURE: 75 MMHG | RESPIRATION RATE: 18 BRPM | WEIGHT: 180 LBS

## 2022-09-27 DIAGNOSIS — F33.8 OTHER RECURRENT DEPRESSIVE DISORDERS: ICD-10-CM

## 2022-09-27 DIAGNOSIS — R45.89 SUICIDAL BEHAVIOR WITHOUT ATTEMPTED SELF-INJURY: Primary | ICD-10-CM

## 2022-09-27 PROBLEM — F32.9 MDD (MAJOR DEPRESSIVE DISORDER): Status: ACTIVE | Noted: 2022-09-27

## 2022-09-27 LAB
ALBUMIN SERPL-MCNC: 4.19 G/DL (ref 3.5–5.2)
ALBUMIN/GLOB SERPL: 1.5 G/DL
ALP SERPL-CCNC: 101 U/L (ref 39–117)
ALT SERPL W P-5'-P-CCNC: 45 U/L (ref 1–33)
AMPHET+METHAMPHET UR QL: NEGATIVE
AMPHETAMINES UR QL: NEGATIVE
ANION GAP SERPL CALCULATED.3IONS-SCNC: 9.1 MMOL/L (ref 5–15)
AST SERPL-CCNC: 32 U/L (ref 1–32)
B-HCG UR QL: NEGATIVE
BACTERIA UR QL AUTO: ABNORMAL /HPF
BARBITURATES UR QL SCN: NEGATIVE
BASOPHILS # BLD AUTO: 0.05 10*3/MM3 (ref 0–0.2)
BASOPHILS NFR BLD AUTO: 0.6 % (ref 0–1.5)
BENZODIAZ UR QL SCN: NEGATIVE
BILIRUB SERPL-MCNC: 0.2 MG/DL (ref 0–1.2)
BILIRUB UR QL STRIP: NEGATIVE
BUN SERPL-MCNC: 12 MG/DL (ref 6–20)
BUN/CREAT SERPL: 15.4 (ref 7–25)
BUPRENORPHINE SERPL-MCNC: NEGATIVE NG/ML
CALCIUM SPEC-SCNC: 9.5 MG/DL (ref 8.6–10.5)
CANNABINOIDS SERPL QL: NEGATIVE
CHLORIDE SERPL-SCNC: 106 MMOL/L (ref 98–107)
CLARITY UR: ABNORMAL
CO2 SERPL-SCNC: 24.9 MMOL/L (ref 22–29)
COCAINE UR QL: NEGATIVE
COLOR UR: YELLOW
CREAT SERPL-MCNC: 0.78 MG/DL (ref 0.57–1)
DEPRECATED RDW RBC AUTO: 40.5 FL (ref 37–54)
EGFRCR SERPLBLD CKD-EPI 2021: 106.9 ML/MIN/1.73
EOSINOPHIL # BLD AUTO: 0.19 10*3/MM3 (ref 0–0.4)
EOSINOPHIL NFR BLD AUTO: 2.4 % (ref 0.3–6.2)
ERYTHROCYTE [DISTWIDTH] IN BLOOD BY AUTOMATED COUNT: 12.2 % (ref 12.3–15.4)
ETHANOL BLD-MCNC: <10 MG/DL (ref 0–10)
ETHANOL UR QL: <0.01 %
FLUAV SUBTYP SPEC NAA+PROBE: NOT DETECTED
FLUBV RNA ISLT QL NAA+PROBE: NOT DETECTED
GLOBULIN UR ELPH-MCNC: 2.8 GM/DL
GLUCOSE SERPL-MCNC: 99 MG/DL (ref 65–99)
GLUCOSE UR STRIP-MCNC: NEGATIVE MG/DL
HCT VFR BLD AUTO: 41.2 % (ref 34–46.6)
HGB BLD-MCNC: 13.7 G/DL (ref 12–15.9)
HGB UR QL STRIP.AUTO: NEGATIVE
HYALINE CASTS UR QL AUTO: ABNORMAL /LPF
IMM GRANULOCYTES # BLD AUTO: 0.04 10*3/MM3 (ref 0–0.05)
IMM GRANULOCYTES NFR BLD AUTO: 0.5 % (ref 0–0.5)
KETONES UR QL STRIP: NEGATIVE
LEUKOCYTE ESTERASE UR QL STRIP.AUTO: ABNORMAL
LYMPHOCYTES # BLD AUTO: 1.65 10*3/MM3 (ref 0.7–3.1)
LYMPHOCYTES NFR BLD AUTO: 21.2 % (ref 19.6–45.3)
MAGNESIUM SERPL-MCNC: 2.1 MG/DL (ref 1.6–2.6)
MCH RBC QN AUTO: 30.4 PG (ref 26.6–33)
MCHC RBC AUTO-ENTMCNC: 33.3 G/DL (ref 31.5–35.7)
MCV RBC AUTO: 91.4 FL (ref 79–97)
METHADONE UR QL SCN: NEGATIVE
MONOCYTES # BLD AUTO: 0.44 10*3/MM3 (ref 0.1–0.9)
MONOCYTES NFR BLD AUTO: 5.7 % (ref 5–12)
NEUTROPHILS NFR BLD AUTO: 5.4 10*3/MM3 (ref 1.7–7)
NEUTROPHILS NFR BLD AUTO: 69.6 % (ref 42.7–76)
NITRITE UR QL STRIP: NEGATIVE
NRBC BLD AUTO-RTO: 0 /100 WBC (ref 0–0.2)
OPIATES UR QL: NEGATIVE
OXYCODONE UR QL SCN: NEGATIVE
PCP UR QL SCN: NEGATIVE
PH UR STRIP.AUTO: 7.5 [PH] (ref 5–8)
PLATELET # BLD AUTO: 191 10*3/MM3 (ref 140–450)
PMV BLD AUTO: 10.3 FL (ref 6–12)
POTASSIUM SERPL-SCNC: 4.1 MMOL/L (ref 3.5–5.2)
PROPOXYPH UR QL: NEGATIVE
PROT SERPL-MCNC: 7 G/DL (ref 6–8.5)
PROT UR QL STRIP: ABNORMAL
RBC # BLD AUTO: 4.51 10*6/MM3 (ref 3.77–5.28)
RBC # UR STRIP: ABNORMAL /HPF
REF LAB TEST METHOD: ABNORMAL
SARS-COV-2 RNA PNL SPEC NAA+PROBE: NOT DETECTED
SODIUM SERPL-SCNC: 140 MMOL/L (ref 136–145)
SP GR UR STRIP: 1.02 (ref 1–1.03)
SQUAMOUS #/AREA URNS HPF: ABNORMAL /HPF
TRICYCLICS UR QL SCN: NEGATIVE
UROBILINOGEN UR QL STRIP: ABNORMAL
WBC # UR STRIP: ABNORMAL /HPF
WBC NRBC COR # BLD: 7.77 10*3/MM3 (ref 3.4–10.8)

## 2022-09-27 PROCEDURE — 80306 DRUG TEST PRSMV INSTRMNT: CPT | Performed by: STUDENT IN AN ORGANIZED HEALTH CARE EDUCATION/TRAINING PROGRAM

## 2022-09-27 PROCEDURE — 87636 SARSCOV2 & INF A&B AMP PRB: CPT | Performed by: STUDENT IN AN ORGANIZED HEALTH CARE EDUCATION/TRAINING PROGRAM

## 2022-09-27 PROCEDURE — 36415 COLL VENOUS BLD VENIPUNCTURE: CPT

## 2022-09-27 PROCEDURE — 82077 ASSAY SPEC XCP UR&BREATH IA: CPT | Performed by: STUDENT IN AN ORGANIZED HEALTH CARE EDUCATION/TRAINING PROGRAM

## 2022-09-27 PROCEDURE — C9803 HOPD COVID-19 SPEC COLLECT: HCPCS

## 2022-09-27 PROCEDURE — 81025 URINE PREGNANCY TEST: CPT | Performed by: STUDENT IN AN ORGANIZED HEALTH CARE EDUCATION/TRAINING PROGRAM

## 2022-09-27 PROCEDURE — 93005 ELECTROCARDIOGRAM TRACING: CPT | Performed by: PSYCHIATRY & NEUROLOGY

## 2022-09-27 PROCEDURE — 99284 EMERGENCY DEPT VISIT MOD MDM: CPT

## 2022-09-27 PROCEDURE — 85025 COMPLETE CBC W/AUTO DIFF WBC: CPT | Performed by: STUDENT IN AN ORGANIZED HEALTH CARE EDUCATION/TRAINING PROGRAM

## 2022-09-27 PROCEDURE — 80053 COMPREHEN METABOLIC PANEL: CPT | Performed by: STUDENT IN AN ORGANIZED HEALTH CARE EDUCATION/TRAINING PROGRAM

## 2022-09-27 PROCEDURE — 81001 URINALYSIS AUTO W/SCOPE: CPT | Performed by: STUDENT IN AN ORGANIZED HEALTH CARE EDUCATION/TRAINING PROGRAM

## 2022-09-27 PROCEDURE — 83735 ASSAY OF MAGNESIUM: CPT | Performed by: STUDENT IN AN ORGANIZED HEALTH CARE EDUCATION/TRAINING PROGRAM

## 2022-09-27 RX ORDER — ECHINACEA PURPUREA EXTRACT 125 MG
2 TABLET ORAL AS NEEDED
Status: DISCONTINUED | OUTPATIENT
Start: 2022-09-27 | End: 2022-10-04 | Stop reason: HOSPADM

## 2022-09-27 RX ORDER — ONDANSETRON 4 MG/1
4 TABLET, FILM COATED ORAL EVERY 6 HOURS PRN
Status: DISCONTINUED | OUTPATIENT
Start: 2022-09-27 | End: 2022-10-04 | Stop reason: HOSPADM

## 2022-09-27 RX ORDER — BENZONATATE 100 MG/1
100 CAPSULE ORAL 3 TIMES DAILY PRN
Status: DISCONTINUED | OUTPATIENT
Start: 2022-09-27 | End: 2022-10-04 | Stop reason: HOSPADM

## 2022-09-27 RX ORDER — BENZTROPINE MESYLATE 1 MG/1
2 TABLET ORAL ONCE AS NEEDED
Status: DISCONTINUED | OUTPATIENT
Start: 2022-09-27 | End: 2022-10-04 | Stop reason: HOSPADM

## 2022-09-27 RX ORDER — FAMOTIDINE 20 MG/1
20 TABLET, FILM COATED ORAL 2 TIMES DAILY PRN
Status: DISCONTINUED | OUTPATIENT
Start: 2022-09-27 | End: 2022-10-04 | Stop reason: HOSPADM

## 2022-09-27 RX ORDER — IBUPROFEN 400 MG/1
400 TABLET ORAL EVERY 6 HOURS PRN
Status: DISCONTINUED | OUTPATIENT
Start: 2022-09-27 | End: 2022-10-04 | Stop reason: HOSPADM

## 2022-09-27 RX ORDER — HYDROXYZINE 50 MG/1
50 TABLET, FILM COATED ORAL EVERY 6 HOURS PRN
Status: DISCONTINUED | OUTPATIENT
Start: 2022-09-27 | End: 2022-10-04 | Stop reason: HOSPADM

## 2022-09-27 RX ORDER — NICOTINE 21 MG/24HR
1 PATCH, TRANSDERMAL 24 HOURS TRANSDERMAL
Status: DISCONTINUED | OUTPATIENT
Start: 2022-09-27 | End: 2022-10-04 | Stop reason: HOSPADM

## 2022-09-27 RX ORDER — HYDROXYZINE HYDROCHLORIDE 25 MG/1
25 TABLET, FILM COATED ORAL 3 TIMES DAILY PRN
Status: CANCELLED | OUTPATIENT
Start: 2022-09-27

## 2022-09-27 RX ORDER — ALUMINA, MAGNESIA, AND SIMETHICONE 2400; 2400; 240 MG/30ML; MG/30ML; MG/30ML
15 SUSPENSION ORAL EVERY 6 HOURS PRN
Status: DISCONTINUED | OUTPATIENT
Start: 2022-09-27 | End: 2022-10-04 | Stop reason: HOSPADM

## 2022-09-27 RX ORDER — BENZTROPINE MESYLATE 1 MG/ML
1 INJECTION INTRAMUSCULAR; INTRAVENOUS ONCE AS NEEDED
Status: DISCONTINUED | OUTPATIENT
Start: 2022-09-27 | End: 2022-10-04 | Stop reason: HOSPADM

## 2022-09-27 RX ORDER — FLUOXETINE HYDROCHLORIDE 20 MG/1
40 CAPSULE ORAL DAILY
Status: CANCELLED | OUTPATIENT
Start: 2022-09-27

## 2022-09-27 RX ORDER — LOPERAMIDE HYDROCHLORIDE 2 MG/1
2 CAPSULE ORAL
Status: DISCONTINUED | OUTPATIENT
Start: 2022-09-27 | End: 2022-10-04 | Stop reason: HOSPADM

## 2022-09-27 RX ADMIN — NICOTINE TRANSDERMAL SYSTEM 1 PATCH: 21 PATCH, EXTENDED RELEASE TRANSDERMAL at 16:30

## 2022-09-27 RX ADMIN — HYDROXYZINE HYDROCHLORIDE 50 MG: 50 TABLET, FILM COATED ORAL at 22:46

## 2022-09-28 PROBLEM — F33.8 OTHER RECURRENT DEPRESSIVE DISORDERS: Status: ACTIVE | Noted: 2022-07-13

## 2022-09-28 LAB
QT INTERVAL: 426 MS
QTC INTERVAL: 428 MS

## 2022-09-28 PROCEDURE — 99223 1ST HOSP IP/OBS HIGH 75: CPT | Performed by: PSYCHIATRY & NEUROLOGY

## 2022-09-29 PROCEDURE — 99232 SBSQ HOSP IP/OBS MODERATE 35: CPT | Performed by: PSYCHIATRY & NEUROLOGY

## 2022-09-29 RX ORDER — MIRTAZAPINE 15 MG/1
15 TABLET, FILM COATED ORAL NIGHTLY
Status: DISCONTINUED | OUTPATIENT
Start: 2022-09-29 | End: 2022-10-04 | Stop reason: HOSPADM

## 2022-09-29 RX ADMIN — MIRTAZAPINE 15 MG: 15 TABLET, FILM COATED ORAL at 20:31

## 2022-09-30 PROCEDURE — 99232 SBSQ HOSP IP/OBS MODERATE 35: CPT | Performed by: PSYCHIATRY & NEUROLOGY

## 2022-09-30 RX ADMIN — HYDROXYZINE HYDROCHLORIDE 50 MG: 50 TABLET, FILM COATED ORAL at 20:08

## 2022-09-30 RX ADMIN — NICOTINE TRANSDERMAL SYSTEM 1 PATCH: 21 PATCH, EXTENDED RELEASE TRANSDERMAL at 15:54

## 2022-09-30 RX ADMIN — MIRTAZAPINE 15 MG: 15 TABLET, FILM COATED ORAL at 20:08

## 2022-10-01 PROCEDURE — 99232 SBSQ HOSP IP/OBS MODERATE 35: CPT | Performed by: PSYCHIATRY & NEUROLOGY

## 2022-10-01 RX ADMIN — MIRTAZAPINE 15 MG: 15 TABLET, FILM COATED ORAL at 20:16

## 2022-10-01 RX ADMIN — NICOTINE TRANSDERMAL SYSTEM 1 PATCH: 21 PATCH, EXTENDED RELEASE TRANSDERMAL at 08:31

## 2022-10-01 RX ADMIN — HYDROXYZINE HYDROCHLORIDE 50 MG: 50 TABLET, FILM COATED ORAL at 20:16

## 2022-10-01 NOTE — PROGRESS NOTES
" Inpatient Psych Progress Note     Clinician: Augie Philippe MD  Admission Date: 9/27/2022  09:04 EDT 10/01/22    Behavioral Health Treatment Plan and Problem List: I have reviewed and approved the Behavioral Health Treatment Plan and Problem list.    Allergies  Allergies   Allergen Reactions   • Augmentin [Amoxicillin-Pot Clavulanate] Anaphylaxis and Hives   • Latex Anaphylaxis and Hives   • Phenergan [Promethazine] Anaphylaxis and Hives   • Seroquel [Quetiapine] Anaphylaxis and Hives   • Trazodone Unknown (See Comments)     Seizures        Hospital Day: 4 days      Assessment completed within view of staff    History  CC/clinical focus: depression/ substance absuse    Interval HPI: Patient seen and evaluated by me.  Chart reviewed.   Patient rates  level of depression (subjectively) at a  10 /10.  Anxiety   10/10.  Patient tolerating meds okay.  Denies side effects.  Med Compliant.  C/o vaginal discharge  ROS otherwise as below.      Interval Review of Systems:   General ROS: negative for - fever or malaise  Endocrine ROS: negative for - palpitations  Respiratory ROS: no cough, shortness of breath, or wheezing  Cardiovascular ROS: no chest pain or dyspnea on exertion  Gastrointestinal ROS: no abdominal pain,no black or bloody stools    /63 (BP Location: Right arm, Patient Position: Lying)   Pulse 75   Temp 97.1 °F (36.2 °C) (Temporal)   Resp 18   Ht 162.6 cm (64\")   Wt 85.8 kg (189 lb 3.2 oz)   LMP 09/20/2022 Comment: last period in August 2022  SpO2 98%   BMI 32.48 kg/m²     Mental Status Exam  Mood: depressed  Affect: mood-congruent   Thought Processes: linear  Thought Content: normal  Hallucinations: no  Suicidal Thoughts: denies  Suicidal Plan/Intent: denies  Hopelesness:Moderate  Homicidal Thoughts:  denies      Medical Decision Making:   Labs:     Lab Results (last 24 hours)     ** No results found for the last 24 hours. **            Radiology:     Imaging Results (Last 24 Hours)     ** No " results found for the last 24 hours. **            EKG:     ECG/EMG Results (most recent)     Procedure Component Value Units Date/Time    ECG 12 Lead [[id number]] Collected: 09/28/22 0725     Updated: 09/28/22 1909     QT Interval 426 ms      QTC Interval 428 ms     Narrative:      Test Reason : Baseline Cardiac Status  Blood Pressure :   */*   mmHG  Vent. Rate :  61 BPM     Atrial Rate :  61 BPM     P-R Int : 190 ms          QRS Dur : 116 ms      QT Int : 426 ms       P-R-T Axes :  65  60  60 degrees     QTc Int : 428 ms    Normal sinus rhythm  Incomplete right bundle branch block  Borderline ECG  Confirmed by Chayo Farmer ([id number]) on 9/28/2022 7:09:43 PM    Referred By:            Confirmed By: Chayo Farmer           Medications:  influenza vaccine, 0.5 mL, Intramuscular, Once  mirtazapine, 15 mg, Oral, Nightly  nicotine, 1 patch, Transdermal, Q24H           All medications reviewed.      Assessment and Plan:    Other recurrent depressive disorders (HCC)  Will initiate antidepressant medication with Remeron, will provide for supportive individual and group psychotherapeutic effort       Methamphetamine use disorder, severe, dependence (HCC)  Patient has longstanding substance abuse history, is homeless, recommending a residential chemical dependency program best option perhaps [facility].       Vaginal Discharge  - Consult Gynecology       Continue hospitalization for safety and stabilization.  Continue current level of Special Precautions (q15 minute checks).

## 2022-10-01 NOTE — PLAN OF CARE
Goal Outcome Evaluation:  Plan of Care Reviewed With: patient  Patient Agreement with Plan of Care: agrees     Progress: no change  Outcome Evaluation: Pt reports Anxiety 10, Depression 10. Pt withdrawn and not interacting with other patients. Pt voiced no complaints at this time. Pt denies SI/HI at this time. Pt encouraged to spend time in Dayroom with others.

## 2022-10-01 NOTE — PLAN OF CARE
Goal Outcome Evaluation:  Plan of Care Reviewed With: patient  Patient Agreement with Plan of Care: agrees     Progress: no change  Outcome Evaluation: Patient has isolated self in room today and has avoided social interaction. Rates anxiety and depression both a 10. Denies SI/HI or AVH. Patient has an OBGYN consult for vaginal discharge to be done on Monday morning.

## 2022-10-02 PROCEDURE — 99232 SBSQ HOSP IP/OBS MODERATE 35: CPT | Performed by: PSYCHIATRY & NEUROLOGY

## 2022-10-02 RX ADMIN — HYDROXYZINE HYDROCHLORIDE 50 MG: 50 TABLET, FILM COATED ORAL at 12:59

## 2022-10-02 RX ADMIN — HYDROXYZINE HYDROCHLORIDE 50 MG: 50 TABLET, FILM COATED ORAL at 20:21

## 2022-10-02 RX ADMIN — NICOTINE TRANSDERMAL SYSTEM 1 PATCH: 21 PATCH, EXTENDED RELEASE TRANSDERMAL at 10:30

## 2022-10-02 RX ADMIN — MIRTAZAPINE 15 MG: 15 TABLET, FILM COATED ORAL at 20:21

## 2022-10-02 NOTE — PLAN OF CARE
Goal Outcome Evaluation:  Plan of Care Reviewed With: patient  Patient Agreement with Plan of Care: agrees        Outcome Evaluation: Patient reports anxiety and depression at 10.  Denies SI/HI or AVH.  Patient cooperative this shift.

## 2022-10-02 NOTE — ED PROVIDER NOTES
Subjective   History of Present Illness  Patient is a 27-year-old female with significant past medical history positive for anxiety, depression, substance abuse, suicidal thoughts presenting to the ER for psychiatric evaluation.Patient reports that she has [facility] in [place] for several weeks and today she went to them and told them that she feels all her meds must not be working because she is very depressed and wants to overdose. They drove her straight over to be admitted. She states that she was just here and feels that she is no better and needs new meds. Anxiety and depression 10/10. Reports no current drug use. Vague with details of past use. Reports active SI with plan to overdose. States that she is trying to get her kids back and doing rehab to get off meth and feels alone and misses her kids. Feels she is not strong enough to do this.     History provided by:  Patient   used: No        Review of Systems   Constitutional: Negative.  Negative for fever.   HENT: Negative.    Respiratory: Negative.    Cardiovascular: Negative.  Negative for chest pain.   Gastrointestinal: Negative.  Negative for abdominal pain.   Endocrine: Negative.    Genitourinary: Negative.  Negative for dysuria.   Skin: Negative.    Neurological: Negative.    Psychiatric/Behavioral: Positive for suicidal ideas.   All other systems reviewed and are negative.      Past Medical History:   Diagnosis Date   • Anxiety    • Brain tumor (HCC)    • Depression    • Substance abuse (HCC)    • Suicidal intent    • Suicide attempt (HCC)        Allergies   Allergen Reactions   • Augmentin [Amoxicillin-Pot Clavulanate] Anaphylaxis and Hives   • Latex Anaphylaxis and Hives   • Phenergan [Promethazine] Anaphylaxis and Hives   • Seroquel [Quetiapine] Anaphylaxis and Hives   • Trazodone Unknown (See Comments)     Seizures        Past Surgical History:   Procedure Laterality Date   • TONSILLECTOMY          History reviewed. No pertinent family history.    Social History     Socioeconomic History   • Marital status: Single   • Number of children: 2   • Highest education level: GED or equivalent   Tobacco Use   • Smoking status: Current Every Day Smoker     Packs/day: 1.00     Years: 16.00     Pack years: 16.00     Types: Cigarettes   • Smokeless tobacco: Never Used   • Tobacco comment: began smoking at the age of 10   Vaping Use   • Vaping Use: Every day   • Substances: Nicotine, Flavoring   • Devices: Disposable   Substance and Sexual Activity   • Alcohol use: Not Currently   • Drug use: Yes     Types: Methamphetamines     Comment: suboxone   • Sexual activity: Defer     Partners: Male           Objective   Physical Exam  Vitals and nursing note reviewed.   Constitutional:       General: She is not in acute distress.     Appearance: She is well-developed. She is not diaphoretic.   HENT:      Head: Normocephalic and atraumatic.      Right Ear: External ear normal.      Left Ear: External ear normal.      Nose: Nose normal.   Eyes:      Conjunctiva/sclera: Conjunctivae normal.      Pupils: Pupils are equal, round, and reactive to light.   Neck:      Vascular: No JVD.      Trachea: No tracheal deviation.   Cardiovascular:      Rate and Rhythm: Normal rate and regular rhythm.      Heart sounds: Normal heart sounds. No murmur heard.  Pulmonary:      Effort: Pulmonary effort is normal. No respiratory distress.      Breath sounds: Normal breath sounds. No wheezing.   Abdominal:      General: Bowel sounds are normal.      Palpations: Abdomen is soft.      Tenderness: There is no abdominal tenderness.   Musculoskeletal:         General: No deformity. Normal range of motion.      Cervical back: Normal range of motion and neck supple.   Skin:     General: Skin is warm and dry.      Coloration: Skin is not pale.      Findings: No erythema or rash.   Neurological:      Mental Status: She is alert and oriented to person,  place, and time.      Cranial Nerves: No cranial nerve deficit.   Psychiatric:         Mood and Affect: Mood is anxious. Affect is flat.         Thought Content: Thought content includes suicidal ideation. Thought content includes suicidal plan.         Procedures           ED Course                                           MDM    Final diagnoses:   Suicidal behavior without attempted self-injury       ED Disposition  ED Disposition     ED Disposition   DC/Transfer to Behavioral [facility]   --    Comment   --             No follow-up provider specified.       Medication List      No changes were made to your prescriptions during this visit.          Letha Ordoñez, APRN  10/02/22 0059

## 2022-10-02 NOTE — PROGRESS NOTES
" Inpatient Psych Progress Note     Clinician: Augie Philippe MD  Admission Date: 9/27/2022  09:42 EDT 10/02/22    Behavioral Health Treatment Plan and Problem List: I have reviewed and approved the Behavioral Health Treatment Plan and Problem list.    Allergies  Allergies   Allergen Reactions   • Augmentin [Amoxicillin-Pot Clavulanate] Anaphylaxis and Hives   • Latex Anaphylaxis and Hives   • Phenergan [Promethazine] Anaphylaxis and Hives   • Seroquel [Quetiapine] Anaphylaxis and Hives   • Trazodone Unknown (See Comments)     Seizures        Hospital Day: 5 days      Assessment completed within view of staff    History  CC/clinical focus: depression/ substance absuse    Interval HPI: Patient seen and evaluated by me.  Chart reviewed. Continues complaining of severe symptoms of depression, rating depression at 10 out of 10.  Med Compliant.  ROS otherwise as below.      Interval Review of Systems:   General ROS: negative for - fever or malaise. C/o vaginal discharge  Endocrine ROS: negative for - palpitations  Respiratory ROS: no cough, shortness of breath, or wheezing  Cardiovascular ROS: no chest pain or dyspnea on exertion  Gastrointestinal ROS: no abdominal pain,no black or bloody stools    BP 96/59 (BP Location: Right arm, Patient Position: Lying)   Pulse 62   Temp 97.8 °F (36.6 °C) (Temporal)   Resp 18   Ht 162.6 cm (64\")   Wt 85.8 kg (189 lb 3.2 oz)   LMP 09/20/2022 Comment: last period in August 2022  SpO2 97%   BMI 32.48 kg/m²     Mental Status Exam  Mood: depressed  Affect: mood-congruent   Thought Processes: linear  Thought Content: negativistic  Hallucinations: no  Suicidal Thoughts: denies  Suicidal Plan/Intent: denies  Hopelesness:Moderate  Homicidal Thoughts:  denies      Medical Decision Making:   Labs:     Lab Results (last 24 hours)     ** No results found for the last 24 hours. **            Radiology:     Imaging Results (Last 24 Hours)     ** No results found for the last 24 hours. **    "         EKG:     ECG/EMG Results (most recent)     Procedure Component Value Units Date/Time    ECG 12 Lead [[id number]] Collected: 09/28/22 0725     Updated: 09/28/22 1909     QT Interval 426 ms      QTC Interval 428 ms     Narrative:      Test Reason : Baseline Cardiac Status  Blood Pressure :   */*   mmHG  Vent. Rate :  61 BPM     Atrial Rate :  61 BPM     P-R Int : 190 ms          QRS Dur : 116 ms      QT Int : 426 ms       P-R-T Axes :  65  60  60 degrees     QTc Int : 428 ms    Normal sinus rhythm  Incomplete right bundle branch block  Borderline ECG  Confirmed by Chayo Farmer ([id number]) on 9/28/2022 7:09:43 PM    Referred By:            Confirmed By: Chayo Farmer           Medications:  influenza vaccine, 0.5 mL, Intramuscular, Once  mirtazapine, 15 mg, Oral, Nightly  nicotine, 1 patch, Transdermal, Q24H           All medications reviewed.      Assessment and Plan:     Other recurrent depressive disorders (HCC)  Will initiate antidepressant medication with Remeron, will provide for supportive individual and group psychotherapeutic effort       Methamphetamine use disorder, severe, dependence (HCC)  Patient has longstanding substance abuse history, is homeless, recommending a residential chemical dependency program best option perhaps returning to the [facility].        Vaginal Discharge  - Consult Gynecology       Continue hospitalization for safety and stabilization.  Continue current level of Special Precautions (q15 minute checks).

## 2022-10-02 NOTE — PLAN OF CARE
Goal Outcome Evaluation:  Plan of Care Reviewed With: patient  Patient Agreement with Plan of Care: agrees     Progress: improving  Outcome Evaluation: Patient calm and cooperative. Rates depression a10. Denies any anxiety. Patient out of room more today, watching television. Denies SI/HI or AVH. Patient has an OB/GYN consult that needs to be completed on Monday.

## 2022-10-03 PROCEDURE — 99232 SBSQ HOSP IP/OBS MODERATE 35: CPT | Performed by: PSYCHIATRY & NEUROLOGY

## 2022-10-03 RX ADMIN — MIRTAZAPINE 15 MG: 15 TABLET, FILM COATED ORAL at 20:05

## 2022-10-03 RX ADMIN — NICOTINE TRANSDERMAL SYSTEM 1 PATCH: 21 PATCH, EXTENDED RELEASE TRANSDERMAL at 08:08

## 2022-10-03 NOTE — PLAN OF CARE
Goal Outcome Evaluation:  Plan of Care Reviewed With: patient  Patient Agreement with Plan of Care: agrees     Progress: improving  Outcome Evaluation: Pt stated her appetite and sleep are good. pt rated anxiety and depression a 10/10. Pt stated she felt helpless. Pt stated she had no si/hi or avh. Pt was calm and cooperative this shift with no other issues or concerns.

## 2022-10-03 NOTE — PROGRESS NOTES
"INPATIENT PSYCHIATRIC PROGRESS NOTE    Name:  Anali Das  :  1995  MRN:  [id number]  Visit Number:  [id number]  Length of stay:  6    Behavioral Health Treatment Plan and Problem List: I have reviewed and approved the Behavioral Health Treatment Plan and Problem list.    SUBJECTIVE    CC/ Focus of exam: depression/ substance abuse.     Patient's subjective status: \"doing OK\"    INTERVAL HISTORY: The patient remains depressed with a sense of hopelessness but says no SI/SP. Current state of mind has been long standing, not an acute mood change. Her chronic dysphoria primarily associated with her life as its been and the lack of a sustained recovery effort. No psychotic symptoms. Primary drug of choice Meth \"that speeds me up but not going back to that\".     Patient wants to return to the [facility] and can do so when accepted back there. She says she will participate in their program.     Depression rating \"still about a 10\".   Anxiety rating 10/10  Sleep: slept well  Withdrawal sx: none  Craving: says not     ROS: No cardiovascular, GI, or Neurological complaints.       OBJECTIVE    Vitals:    10/03/22 0730   BP: 103/64   Pulse: 73   Resp: 18   Temp: 98.3 °F (36.8 °C)   SpO2: 96%      Temp:  [98.3 °F (36.8 °C)] 98.3 °F (36.8 °C)  Heart Rate:  [73] 73  Resp:  [18] 18  BP: (103)/(64) 103/64    MENTAL STATUS EXAM:         Psychomotor: No psychomotor agitation/retardation, No EPS, No motor tics  Speech-normal rate, amount.  Mood/Affect:  Depressed  Thought Processes:  coherent  Thought Content:  mood congruent  Hallucination(s): none  Hopelessness: Yes  Optimistic:No  Suicidal Thoughts:   No  Suicidal Plan/Intent:  No  Homicidal Thoughts:  absent  Orientation: oriented x 3  Memory: recent intact    Lab Results (last 24 hours)     ** No results found for the last 24 hours. **           Imaging Results (Last 24 Hours)     ** No results found for the last 24 hours. **           Lab and x-ray studies " reviewed.    ECG/EMG Results (most recent)     Procedure Component Value Units Date/Time    ECG 12 Lead [[id number]] Collected: 09/28/22 0725     Updated: 09/28/22 1909     QT Interval 426 ms      QTC Interval 428 ms     Narrative:      Test Reason : Baseline Cardiac Status  Blood Pressure :   */*   mmHG  Vent. Rate :  61 BPM     Atrial Rate :  61 BPM     P-R Int : 190 ms          QRS Dur : 116 ms      QT Int : 426 ms       P-R-T Axes :  65  60  60 degrees     QTc Int : 428 ms    Normal sinus rhythm  Incomplete right bundle branch block  Borderline ECG  Confirmed by Chayo Farmer ([id number]) on 9/28/2022 7:09:43 PM    Referred By:            Confirmed By: Chayo Farmer           ALLERGIES: Augmentin [amoxicillin-pot clavulanate], Latex, Phenergan [promethazine], Seroquel [quetiapine], and Trazodone    Medications:     influenza vaccine, 0.5 mL, Intramuscular, Once  mirtazapine, 15 mg, Oral, Nightly  nicotine, 1 patch, Transdermal, Q24H       ASSESSMENT & PLAN      Other recurrent depressive disorders (HCC)  Will initiate antidepressant medication with Remeron, will provide for supportive individual and group psychotherapeutic effort       Methamphetamine use disorder, severe, dependence (HCC)  Patient has longstanding substance abuse history, is homeless, recommending a residential chemical dependency program best option perhaps returning to the [facility].        Vaginal Discharge  - Consult Gynecology       Special precautions: Special Precautions Level 3 (q15 min checks)     Behavioral Health Treatment Plan and Problem List: I have reviewed and approved the Behavioral Health Treatment Plan and Problem list.    I spent a total of 26 minutes in direct patient care including  17 minutes face to face with the patient assessment, coordination of care, and counseling the patient on the current and follow-up treatment plans regarding her status and situation.  Patient had no additional questions.     C.  Kale Philippe MD    Clinician:  Houston Philippe MD  10/03/22  09:18 EDT    Dictated utilizing Dragon dictation

## 2022-10-03 NOTE — PLAN OF CARE
Problem: Adult Behavioral Health Plan of Care  Goal: Develops/Participates in [facility] [place] to Support Successful Transition  Intervention: Mutually Develop Transition Plan  Recent Flowsheet Documentation  Taken 10/3/2022 1637 by Jelena Beckman LCSW  Transportation Anticipated: agency  Transportation Concerns: no car  Current Discharge Risk:   psychiatric illness   substance use/abuse  Concerns to be Addressed:   coping/stress   mental health   cognitive/perceptual   substance/tobacco abuse/use  Readmission Within the Last 30 Days: previous discharge plan unsuccessful  Patient/Family Anticipated Services at Transition: other (see comments)  Patient's Choice of Community Agency(s): [employer]  Patient/Family Anticipates Transition to: other (see comments)  Offered/Gave Vendor List: no    Patient is planned to attend [facility] tomorrow 10/04/2022 and her  time is scheduled for noon.  Patient remains largely isolated to her room.  She denies suicidal ideation and denies homicidal ideation.  She reports ongoing depression and anxiety.  She continues to express a desire to remain clean and sober.  She is planned to attend [employer] [facility] and upon completion return to [facility].

## 2022-10-03 NOTE — PLAN OF CARE
Goal Outcome Evaluation:  Plan of Care Reviewed With: patient  Patient Agreement with Plan of Care: agrees     Progress: no change  Outcome Evaluation: Patient continues to be quiet and withdrawn to room. Rates anxiety and depression both a 4. Denies SI/HI or AVH. Patient was encouraged to come out of room and interact with others.

## 2022-10-03 NOTE — NURSING NOTE
On-call OB/GYN doctor, Domingo Esparza, was contacted in regards to OB/GYN consult for patient's complaints of vaginal discharge. Dr. Domingo Esparza was added to the patient's treatment team at this time.

## 2022-10-04 VITALS
WEIGHT: 189.2 LBS | HEIGHT: 64 IN | TEMPERATURE: 97.9 F | HEART RATE: 76 BPM | DIASTOLIC BLOOD PRESSURE: 60 MMHG | OXYGEN SATURATION: 97 % | RESPIRATION RATE: 18 BRPM | BODY MASS INDEX: 32.3 KG/M2 | SYSTOLIC BLOOD PRESSURE: 126 MMHG

## 2022-10-04 PROCEDURE — 99239 HOSP IP/OBS DSCHRG MGMT >30: CPT | Performed by: PSYCHIATRY & NEUROLOGY

## 2022-10-04 RX ORDER — FLUCONAZOLE 150 MG/1
150 TABLET ORAL DAILY
Qty: 4 TABLET | Refills: 0 | Status: SHIPPED | OUTPATIENT
Start: 2022-10-04 | End: 2022-10-08

## 2022-10-04 RX ORDER — MIRTAZAPINE 30 MG/1
30 TABLET, FILM COATED ORAL NIGHTLY
Qty: 30 TABLET | Refills: 0 | Status: SHIPPED | OUTPATIENT
Start: 2022-10-04 | End: 2023-10-04

## 2022-10-04 RX ORDER — METRONIDAZOLE 250 MG/1
500 TABLET ORAL EVERY 12 HOURS SCHEDULED
Status: DISCONTINUED | OUTPATIENT
Start: 2022-10-04 | End: 2022-10-04 | Stop reason: HOSPADM

## 2022-10-04 RX ORDER — FLUCONAZOLE 100 MG/1
150 TABLET ORAL DAILY
Status: DISCONTINUED | OUTPATIENT
Start: 2022-10-04 | End: 2022-10-04 | Stop reason: HOSPADM

## 2022-10-04 RX ORDER — METRONIDAZOLE 500 MG/1
500 TABLET ORAL EVERY 12 HOURS SCHEDULED
Qty: 14 TABLET | Refills: 0 | Status: SHIPPED | OUTPATIENT
Start: 2022-10-04 | End: 2022-10-11

## 2022-10-04 RX ADMIN — METRONIDAZOLE 500 MG: 250 TABLET ORAL at 11:21

## 2022-10-04 RX ADMIN — NICOTINE TRANSDERMAL SYSTEM 1 PATCH: 21 PATCH, EXTENDED RELEASE TRANSDERMAL at 09:15

## 2022-10-04 RX ADMIN — FLUCONAZOLE 150 MG: 100 TABLET ORAL at 11:21

## 2022-10-04 NOTE — PROGRESS NOTES
PROGRESS NOTE         Patient Identification:  Name:  Anali Das  Age:  27 y.o.  Sex:  female  :  1995  MRN:  [id number]  Visit Number:  [id number]  Primary Care Provider:  Provider, No Known      ----------------------------------------------------------------------------------------------------------------------  Subjective       Chief Complaints:    No chief complaint on file.        Interval History:   Pt inpatient psych for depression. Pt c/o vaginal discharge. States discharge is itching, burning, and has an odor with green color.         Review of Systems:    Constitutional: no fever, chills and night sweats. No appetite change or unexpected weight change. No fatigue.  Eyes: no eye drainage, itching or redness.  HEENT: no mouth sores, dysphagia or nose bleed.  Respiratory: no for shortness of breath, cough or production of sputum.  Cardiovascular: no chest pain, no palpitations, no orthopnea.  Gastrointestinal: no nausea, vomiting or diarrhea. No abdominal pain, hematemesis or rectal bleeding.  Genitourinary: no dysuria or polyuria. Vaginal Discharge present.  Hematologic/lymphatic: no lymph node abnormalities, no easy bruising or easy bleeding.  Musculoskeletal: no muscle or joint pain.  Skin: No rash and no itching.  Neurological: no loss of consciousness, no seizure, no headache.  Behavioral/Psych: no depression or suicidal ideation.  Endocrine: no hot flashes.  Immunologic: negative.  ----------------------------------------------------------------------------------------------------------------------      Objective       [facility] Meds:  fluconazole, 150 mg, Oral, Q24H  influenza vaccine, 0.5 mL, Intramuscular, Once  metroNIDAZOLE, 500 mg, Oral, Q12H  mirtazapine, 15 mg, Oral, Nightly  nicotine, 1 patch, Transdermal, Q24H         ----------------------------------------------------------------------------------------------------------------------    Vital Signs:  Temp:   [97.8 °F (36.6 °C)-97.9 °F (36.6 °C)] 97.9 °F (36.6 °C)  Heart Rate:  [76-81] 76  Resp:  [18] 18  BP: (120-126)/(60-76) 126/60  No data found.  SpO2 Percentage    10/03/22 0730 10/03/22 2000 10/04/22 0800   SpO2: 96% 97% 97%     SpO2:  [97 %] 97 %  on   ;   Device (Oxygen Therapy): room air    Body mass index is 32.48 kg/m².  Wt Readings from Last 3 Encounters:   09/27/22 85.8 kg (189 lb 3.2 oz)   09/27/22 81.6 kg (180 lb)   09/08/22 81.1 kg (178 lb 12.8 oz)      No intake or output data in the 24 hours ending 10/04/22 0954  Diet Regular  ----------------------------------------------------------------------------------------------------------------------    Physical exam:    Constitutional:  Well-developed and well-nourished.  No respiratory distress.      HENT:  Head:  Normocephalic and atraumatic.  Mouth:  Moist mucous membranes.    Eyes:  Conjunctivae and EOM are normal.  No scleral icterus.    Neck:  Neck supple.  No JVD present.    Cardiovascular:  Normal rate, regular rhythm and normal heart sounds with no murmur. No edema.  Pulmonary/Chest:  No respiratory distress, no wheezes, no crackles, with normal breath sounds and good air movement.  Abdominal:  Soft.  Bowel sounds are normal.  No distension and no tenderness. Vaginal discharge.  Musculoskeletal:  No edema, no tenderness, and no deformity.  No red or swollen joints anywhere.    Neurological:  Alert and oriented to person, place, and time. No facial droop.  No slurred speech.   Skin:  Skin is warm and dry. No rash noted. No pallor.       ----------------------------------------------------------------------------------------------------------------------            Results from last 7 days   Lab Units 09/27/22  1511   WBC 10*3/mm3 7.77   HEMOGLOBIN g/dL 13.7   HEMATOCRIT % 41.2   MCV fL 91.4   MCHC g/dL 33.3   PLATELETS 10*3/mm3 191     Results from last 7 days   Lab Units 09/27/22  1511   SODIUM mmol/L 140   POTASSIUM mmol/L 4.1   MAGNESIUM mg/dL 2.1    CHLORIDE mmol/L 106   CO2 mmol/L 24.9   BUN mg/dL 12   CREATININE mg/dL 0.78   CALCIUM mg/dL 9.5   GLUCOSE mg/dL 99   ALBUMIN g/dL 4.19   BILIRUBIN mg/dL 0.2   ALK PHOS U/L 101   AST (SGOT) U/L 32   ALT (SGPT) U/L 45*   Estimated Creatinine Clearance: 114.8 mL/min (by C-G formula based on SCr of 0.78 mg/dL).  No results found for: AMMONIA    No results found for: HGBA1C, POCGLU  Lab Results   Component Value Date    HGBA1C 3.9 07/08/2022     Lab Results   Component Value Date    TSH 5.460 (H) 06/27/2022    TSH 5.460 (H) 06/27/2022    FREET4 1.0 07/08/2022       No results found for: BLOODCX  No results found for: URINECX  No results found for: WOUNDCX  No results found for: STOOLCX  No results found for: RESPCX  Pain Management Panel     Pain Management Panel Latest Ref Rng & Units 9/27/2022 9/8/2022    AMPHETAMINES SCREEN, URINE Negative Negative Negative    BARBITURATES SCREEN Negative Negative Negative    BENZODIAZEPINE SCREEN, URINE Negative Negative Positive(A)    BUPRENORPHINEUR Negative Negative Negative    COCAINE SCREEN, URINE Negative Negative Negative    FENTANYL URINE QT Negative - -    HYDROMORPHONE <5 ng/mL - -    METHADONE SCREEN, URINE Negative Negative Negative    METHAMPHETAMINEUR Negative Negative Negative          I have personally reviewed the above laboratory results.   ----------------------------------------------------------------------------------------------------------------------  Imaging Results (Last 24 Hours)     ** No results found for the last 24 hours. **        I have personally reviewed the above radiology results.   ----------------------------------------------------------------------------------------------------------------------    Assessment/Plan       [unfilled]    ASSESSMENT & PLAN:    BV and yeast: Treat vaginal discharge with oral Flagyl and Diflucan. Plan f/u [facility] outpatient at [facility].         Code Status:   Code Status and Medical  Interventions:   Ordered at: 09/27/22 1621     Code Status (Patient has no pulse and is not breathing):    CPR (Attempt to Resuscitate)     Medical Interventions (Patient has pulse or is breathing):    Full Support     Release to patient:    Routine Release       JOSE F Delatorre  10/04/22  09:54 EDT

## 2022-10-04 NOTE — PLAN OF CARE
Goal Outcome Evaluation:  Plan of Care Reviewed With: patient  Patient Agreement with Plan of Care: agrees     Progress: improving  Outcome Evaluation: Pt stated she was eatign and sleeping good. Pt rated anxiety and depression a 10/10. pt stated she felt hopeless and helpless. Pt had no si/hi or avh.

## 2022-10-04 NOTE — DISCHARGE SUMMARY
Date of Discharge:  10/4/2022    Discharge Diagnosis:Active Problems:    Other recurrent depressive disorders (HCC)    Methamphetamine use disorder, severe, dependence (HCC)        Presenting Problem/History of Present Illness:Patient was admitted to the hospital on September 27 having presented *depression voicing suicidal intent**, voluntarily admitted for safety evaluation and treatment, see admission note for details.         Hospital Course:    Patient was admitted for safety and stabilization and was placed on standard precautions.  Routine labs were checked.  Patient was assigned a master's level therapist and provided with an opportunity to participate in group and individual therapy on the unit.  Patient seen on a daily basis for evaluation and supportive therapy.  Patient presented stating that she was displeased with current medication that included the Lybalvi and Prozac thus medication format changed to mirtazapine and, in the absence of psychotic symptoms, no SGA.  Patient's depression improved slowly and at discharge patient free of any thoughts of harming self or others, free of any psychotic thought content.  Patient remained dysphoric tending to isolate on the unit but was anxious to return to the clean living program. This program required that before the patient return to their program that she be admitted at [facility].  See below for details and medication.    Consults:   Consults     Date and Time Order Name Status Description    10/1/2022  9:10 AM Inpatient Obstetrics / Gynecology Consult            Labs:  Lab Results (all)     None          Imaging:  Imaging Results (All)     None          Condition on Discharge:  improved    Prognosis: fair    Vital Signs  Temp:  [97.8 °F (36.6 °C)-97.9 °F (36.6 °C)] 97.9 °F (36.6 °C)  Heart Rate:  [76-81] 76  Resp:  [18] 18  BP: (120-126)/(60-76) 126/60    Discharge Disposition  Rehab Facility or Unit (DC - External)    Discharge Medications      Discharge Medications      New Medications      Instructions Start Date   fluconazole 150 MG tablet  Commonly known as: DIFLUCAN   150 mg, Oral, Daily      metroNIDAZOLE 500 MG tablet  Commonly known as: FLAGYL   500 mg, Oral, Every 12 Hours Scheduled      mirtazapine 30 MG tablet  Commonly known as: Remeron   30 mg, Oral, Nightly         Stop These Medications    FLUoxetine 40 MG capsule  Commonly known as: PROzac     hydrOXYzine pamoate 25 MG capsule  Commonly known as: VISTARIL     Lybalvi 10-10 MG tablet  Generic drug: OLANZapine-Samidorphan            Discharge Diet: Regular    Activity at Discharge: No restrictions    Follow-up Appointments:    Patient to be transferred to [facility] program with a planned subsequent referral back to [facility] recovery residential program      Houston Philippe MD  10/04/22  10:08 EDT  Time spent with the discharge process >30 minutes.     Dictated utilizing Dragon dictation

## 2022-10-05 NOTE — CASE MANAGEMENT/SOCIAL WORK
..Bridge Session  Date: 10/04/2022   Time: 1050    Data:  Reason for Inpatient Admission: Depression/substance abuse    Follow up: [facility]  [address] [place] [address]  [phone]    October 4 2022        [facility] - [phone] or [phone]          [place] [address] [facility] · [phone]    OCT 11TH, 2022 TUESDAY @ 2PM  ARRIVE 15 MINUTES EARLY       Coping Skills to Utilize: Patient encouraged to engage in physical activity, negative thought redirection, mindfulness and engaging her support system    Crisis Safety Plan:  • Support System to utilize and contact numbers: staff at [facility]    • Educated on crisis hotline numbers (yes/no): Yes    • Was the Patient made aware of contact information for the following: community mental health centers, crisis stabilization programs, residential programs, , etc (yes/no): Yes    Will transportation be a barrier (yes/no): No  • If so, explain solution(s) to resolve barrier: n/a    • How and where will the patient obtain prescribed medications: patients medications were filled today by [facility] and brought to patient.  The cost of the medication was covered by insurance    Assessment: patient denies suicidal ideation and denies homicidal ideation.  Patient reports feeling better and that she is ready to attend [facility] today.    Plan:    Discussed the importance of follow up treatment for continuity of care. The Patient was able to verbalize understanding and commitment to the individualized aftercare and crisis safety plan.      Jelena Beckman LCSW

## 2023-12-21 ENCOUNTER — APPOINTMENT (OUTPATIENT)
Dept: GENERAL RADIOLOGY | Facility: HOSPITAL | Age: 28
DRG: 885 | End: 2023-12-21
Payer: COMMERCIAL

## 2023-12-21 ENCOUNTER — HOSPITAL ENCOUNTER (EMERGENCY)
Facility: HOSPITAL | Age: 28
Discharge: STILL ADMITTED OR ADMINISTRATIVE | DRG: 885 | End: 2023-12-21
Attending: STUDENT IN AN ORGANIZED HEALTH CARE EDUCATION/TRAINING PROGRAM
Payer: COMMERCIAL

## 2023-12-21 ENCOUNTER — HOSPITAL ENCOUNTER (INPATIENT)
Facility: HOSPITAL | Age: 28
LOS: 4 days | Discharge: HOME | DRG: 885 | End: 2023-12-25
Attending: PSYCHIATRY & NEUROLOGY | Admitting: PSYCHIATRY & NEUROLOGY
Payer: COMMERCIAL

## 2023-12-21 VITALS
HEIGHT: 64 IN | RESPIRATION RATE: 16 BRPM | HEART RATE: 86 BPM | TEMPERATURE: 98.1 F | OXYGEN SATURATION: 100 % | BODY MASS INDEX: 29.19 KG/M2 | DIASTOLIC BLOOD PRESSURE: 68 MMHG | WEIGHT: 171 LBS | SYSTOLIC BLOOD PRESSURE: 129 MMHG

## 2023-12-21 DIAGNOSIS — R45.851 SUICIDAL IDEATIONS: Primary | ICD-10-CM

## 2023-12-21 LAB
ALBUMIN SERPL-MCNC: 3.8 G/DL (ref 3.5–5.2)
ALBUMIN/GLOB SERPL: 1.2 G/DL
ALP SERPL-CCNC: 99 U/L (ref 39–117)
ALT SERPL W P-5'-P-CCNC: 24 U/L (ref 1–33)
AMPHET+METHAMPHET UR QL: NEGATIVE
AMPHETAMINES UR QL: NEGATIVE
ANION GAP SERPL CALCULATED.3IONS-SCNC: 8.3 MMOL/L (ref 5–15)
AST SERPL-CCNC: 27 U/L (ref 1–32)
BACTERIA UR QL AUTO: ABNORMAL /HPF
BARBITURATES UR QL SCN: NEGATIVE
BASOPHILS # BLD AUTO: 0.04 10*3/MM3 (ref 0–0.2)
BASOPHILS NFR BLD AUTO: 0.7 % (ref 0–1.5)
BENZODIAZ UR QL SCN: NEGATIVE
BILIRUB SERPL-MCNC: 0.2 MG/DL (ref 0–1.2)
BILIRUB UR QL STRIP: NEGATIVE
BUN SERPL-MCNC: 6 MG/DL (ref 6–20)
BUN/CREAT SERPL: 6.9 (ref 7–25)
BUPRENORPHINE SERPL-MCNC: NEGATIVE NG/ML
CALCIUM SPEC-SCNC: 8.6 MG/DL (ref 8.6–10.5)
CANNABINOIDS SERPL QL: NEGATIVE
CHLORIDE SERPL-SCNC: 105 MMOL/L (ref 98–107)
CLARITY UR: ABNORMAL
CO2 SERPL-SCNC: 24.7 MMOL/L (ref 22–29)
COCAINE UR QL: NEGATIVE
COLOR UR: ABNORMAL
CREAT SERPL-MCNC: 0.87 MG/DL (ref 0.57–1)
DEPRECATED RDW RBC AUTO: 43.1 FL (ref 37–54)
EGFRCR SERPLBLD CKD-EPI 2021: 93.2 ML/MIN/1.73
EOSINOPHIL # BLD AUTO: 0.24 10*3/MM3 (ref 0–0.4)
EOSINOPHIL NFR BLD AUTO: 4.3 % (ref 0.3–6.2)
ERYTHROCYTE [DISTWIDTH] IN BLOOD BY AUTOMATED COUNT: 13.8 % (ref 12.3–15.4)
ETHANOL BLD-MCNC: <10 MG/DL (ref 0–10)
ETHANOL UR QL: <0.01 %
FENTANYL UR-MCNC: NEGATIVE NG/ML
GLOBULIN UR ELPH-MCNC: 3.2 GM/DL
GLUCOSE SERPL-MCNC: 94 MG/DL (ref 65–99)
GLUCOSE UR STRIP-MCNC: NEGATIVE MG/DL
HCG SERPL QL: NEGATIVE
HCT VFR BLD AUTO: 38.1 % (ref 34–46.6)
HGB BLD-MCNC: 12.3 G/DL (ref 12–15.9)
HGB UR QL STRIP.AUTO: NEGATIVE
HOLD SPECIMEN: NORMAL
HYALINE CASTS UR QL AUTO: ABNORMAL /LPF
IMM GRANULOCYTES # BLD AUTO: 0.03 10*3/MM3 (ref 0–0.05)
IMM GRANULOCYTES NFR BLD AUTO: 0.5 % (ref 0–0.5)
KETONES UR QL STRIP: NEGATIVE
LEUKOCYTE ESTERASE UR QL STRIP.AUTO: ABNORMAL
LYMPHOCYTES # BLD AUTO: 1.21 10*3/MM3 (ref 0.7–3.1)
LYMPHOCYTES NFR BLD AUTO: 21.6 % (ref 19.6–45.3)
MAGNESIUM SERPL-MCNC: 2.1 MG/DL (ref 1.6–2.6)
MCH RBC QN AUTO: 27.6 PG (ref 26.6–33)
MCHC RBC AUTO-ENTMCNC: 32.3 G/DL (ref 31.5–35.7)
MCV RBC AUTO: 85.4 FL (ref 79–97)
METHADONE UR QL SCN: NEGATIVE
MONOCYTES # BLD AUTO: 0.35 10*3/MM3 (ref 0.1–0.9)
MONOCYTES NFR BLD AUTO: 6.3 % (ref 5–12)
NEUTROPHILS NFR BLD AUTO: 3.72 10*3/MM3 (ref 1.7–7)
NEUTROPHILS NFR BLD AUTO: 66.6 % (ref 42.7–76)
NITRITE UR QL STRIP: NEGATIVE
NRBC BLD AUTO-RTO: 0 /100 WBC (ref 0–0.2)
OPIATES UR QL: NEGATIVE
OXYCODONE UR QL SCN: NEGATIVE
PCP UR QL SCN: NEGATIVE
PH UR STRIP.AUTO: 5.5 [PH] (ref 5–8)
PLATELET # BLD AUTO: 199 10*3/MM3 (ref 140–450)
PMV BLD AUTO: 9.6 FL (ref 6–12)
POTASSIUM SERPL-SCNC: 3.7 MMOL/L (ref 3.5–5.2)
PROT SERPL-MCNC: 7 G/DL (ref 6–8.5)
PROT UR QL STRIP: ABNORMAL
RBC # BLD AUTO: 4.46 10*6/MM3 (ref 3.77–5.28)
RBC # UR STRIP: ABNORMAL /HPF
REF LAB TEST METHOD: ABNORMAL
SODIUM SERPL-SCNC: 138 MMOL/L (ref 136–145)
SP GR UR STRIP: 1.03 (ref 1–1.03)
SQUAMOUS #/AREA URNS HPF: ABNORMAL /HPF
TRICYCLICS UR QL SCN: NEGATIVE
UROBILINOGEN UR QL STRIP: ABNORMAL
WBC # UR STRIP: ABNORMAL /HPF
WBC NRBC COR # BLD AUTO: 5.59 10*3/MM3 (ref 3.4–10.8)

## 2023-12-21 PROCEDURE — 85025 COMPLETE CBC W/AUTO DIFF WBC: CPT | Performed by: STUDENT IN AN ORGANIZED HEALTH CARE EDUCATION/TRAINING PROGRAM

## 2023-12-21 PROCEDURE — 99285 EMERGENCY DEPT VISIT HI MDM: CPT

## 2023-12-21 PROCEDURE — 36415 COLL VENOUS BLD VENIPUNCTURE: CPT

## 2023-12-21 PROCEDURE — 81001 URINALYSIS AUTO W/SCOPE: CPT | Performed by: STUDENT IN AN ORGANIZED HEALTH CARE EDUCATION/TRAINING PROGRAM

## 2023-12-21 PROCEDURE — 80053 COMPREHEN METABOLIC PANEL: CPT | Performed by: STUDENT IN AN ORGANIZED HEALTH CARE EDUCATION/TRAINING PROGRAM

## 2023-12-21 PROCEDURE — 83735 ASSAY OF MAGNESIUM: CPT | Performed by: STUDENT IN AN ORGANIZED HEALTH CARE EDUCATION/TRAINING PROGRAM

## 2023-12-21 PROCEDURE — 82077 ASSAY SPEC XCP UR&BREATH IA: CPT | Performed by: STUDENT IN AN ORGANIZED HEALTH CARE EDUCATION/TRAINING PROGRAM

## 2023-12-21 PROCEDURE — 84703 CHORIONIC GONADOTROPIN ASSAY: CPT | Performed by: STUDENT IN AN ORGANIZED HEALTH CARE EDUCATION/TRAINING PROGRAM

## 2023-12-21 PROCEDURE — 73080 X-RAY EXAM OF ELBOW: CPT

## 2023-12-21 PROCEDURE — 73080 X-RAY EXAM OF ELBOW: CPT | Performed by: RADIOLOGY

## 2023-12-21 PROCEDURE — 80307 DRUG TEST PRSMV CHEM ANLYZR: CPT | Performed by: STUDENT IN AN ORGANIZED HEALTH CARE EDUCATION/TRAINING PROGRAM

## 2023-12-22 PROBLEM — R45.851 SUICIDAL IDEATION: Status: ACTIVE | Noted: 2023-12-22

## 2023-12-22 LAB
HAV IGM SERPL QL IA: ABNORMAL
HBV CORE IGM SERPL QL IA: ABNORMAL
HBV SURFACE AG SERPL QL IA: ABNORMAL
HCV AB SER DONR QL: REACTIVE
QT INTERVAL: 406 MS
QTC INTERVAL: 415 MS

## 2023-12-22 PROCEDURE — 99223 1ST HOSP IP/OBS HIGH 75: CPT | Performed by: PSYCHIATRY & NEUROLOGY

## 2023-12-22 PROCEDURE — 93010 ELECTROCARDIOGRAM REPORT: CPT | Performed by: INTERNAL MEDICINE

## 2023-12-22 PROCEDURE — 80074 ACUTE HEPATITIS PANEL: CPT | Performed by: PSYCHIATRY & NEUROLOGY

## 2023-12-22 PROCEDURE — 93005 ELECTROCARDIOGRAM TRACING: CPT | Performed by: PSYCHIATRY & NEUROLOGY

## 2023-12-22 RX ORDER — LOPERAMIDE HYDROCHLORIDE 2 MG/1
2 CAPSULE ORAL
Status: DISCONTINUED | OUTPATIENT
Start: 2023-12-22 | End: 2023-12-25 | Stop reason: HOSPADM

## 2023-12-22 RX ORDER — FAMOTIDINE 20 MG/1
20 TABLET, FILM COATED ORAL 2 TIMES DAILY PRN
Status: DISCONTINUED | OUTPATIENT
Start: 2023-12-22 | End: 2023-12-25 | Stop reason: HOSPADM

## 2023-12-22 RX ORDER — ACETAMINOPHEN 325 MG/1
650 TABLET ORAL EVERY 6 HOURS PRN
Status: DISCONTINUED | OUTPATIENT
Start: 2023-12-22 | End: 2023-12-25 | Stop reason: HOSPADM

## 2023-12-22 RX ORDER — ECHINACEA PURPUREA EXTRACT 125 MG
2 TABLET ORAL AS NEEDED
Status: DISCONTINUED | OUTPATIENT
Start: 2023-12-22 | End: 2023-12-25 | Stop reason: HOSPADM

## 2023-12-22 RX ORDER — BENZTROPINE MESYLATE 1 MG/1
2 TABLET ORAL ONCE AS NEEDED
Status: DISCONTINUED | OUTPATIENT
Start: 2023-12-22 | End: 2023-12-25 | Stop reason: HOSPADM

## 2023-12-22 RX ORDER — VENLAFAXINE HYDROCHLORIDE 75 MG/1
75 CAPSULE, EXTENDED RELEASE ORAL DAILY
Status: ON HOLD | COMMUNITY
End: 2023-12-22

## 2023-12-22 RX ORDER — BENZONATATE 100 MG/1
100 CAPSULE ORAL 3 TIMES DAILY PRN
Status: DISCONTINUED | OUTPATIENT
Start: 2023-12-22 | End: 2023-12-25 | Stop reason: HOSPADM

## 2023-12-22 RX ORDER — IBUPROFEN 400 MG/1
400 TABLET ORAL EVERY 6 HOURS PRN
Status: DISCONTINUED | OUTPATIENT
Start: 2023-12-22 | End: 2023-12-25 | Stop reason: HOSPADM

## 2023-12-22 RX ORDER — BENZTROPINE MESYLATE 1 MG/ML
1 INJECTION INTRAMUSCULAR; INTRAVENOUS ONCE AS NEEDED
Status: DISCONTINUED | OUTPATIENT
Start: 2023-12-22 | End: 2023-12-25 | Stop reason: HOSPADM

## 2023-12-22 RX ORDER — NITROFURANTOIN 25; 75 MG/1; MG/1
100 CAPSULE ORAL EVERY 12 HOURS SCHEDULED
Status: DISCONTINUED | OUTPATIENT
Start: 2023-12-22 | End: 2023-12-25 | Stop reason: HOSPADM

## 2023-12-22 RX ORDER — CETIRIZINE HYDROCHLORIDE 10 MG/1
10 TABLET ORAL DAILY
Status: ON HOLD | COMMUNITY
End: 2023-12-22

## 2023-12-22 RX ORDER — ONDANSETRON 4 MG/1
4 TABLET, FILM COATED ORAL EVERY 6 HOURS PRN
Status: DISCONTINUED | OUTPATIENT
Start: 2023-12-22 | End: 2023-12-25 | Stop reason: HOSPADM

## 2023-12-22 RX ORDER — ESCITALOPRAM OXALATE 10 MG/1
10 TABLET ORAL DAILY
Status: ON HOLD | COMMUNITY
End: 2023-12-22

## 2023-12-22 RX ORDER — HYDROXYZINE 50 MG/1
50 TABLET, FILM COATED ORAL EVERY 6 HOURS PRN
Status: DISCONTINUED | OUTPATIENT
Start: 2023-12-22 | End: 2023-12-25 | Stop reason: HOSPADM

## 2023-12-22 RX ORDER — ALUMINA, MAGNESIA, AND SIMETHICONE 2400; 2400; 240 MG/30ML; MG/30ML; MG/30ML
15 SUSPENSION ORAL EVERY 6 HOURS PRN
Status: DISCONTINUED | OUTPATIENT
Start: 2023-12-22 | End: 2023-12-25 | Stop reason: HOSPADM

## 2023-12-22 RX ADMIN — NITROFURANTOIN MONOHYDRATE/MACROCRYSTALS 100 MG: 75; 25 CAPSULE ORAL at 21:07

## 2023-12-22 RX ADMIN — NITROFURANTOIN MONOHYDRATE/MACROCRYSTALS 100 MG: 75; 25 CAPSULE ORAL at 10:37

## 2023-12-22 NOTE — PLAN OF CARE
Goal Outcome Evaluation:  Plan of Care Reviewed With: patient  Patient Agreement with Plan of Care: agrees     Progress: improving       Patient rates anxiety 0 and depression 7, denies thoughts of harming self and others and denies hallucinations.

## 2023-12-22 NOTE — PAYOR COMM NOTE
"Karen Das (28 y.o. Female)       Date of Birth   1995    Social Security Number       Address   [facility] [address] [place] [address]    Home Phone   [phone]    MRN   [id number]       Episcopal   None    Marital Status   Single                            Admission Date   12/21/23    Admission Type   Emergency    Admitting Provider   Avelino Philippe MD    Attending Provider   Avelino Philippe MD    Department, Room/Bed   [facility] ADULT PSYCHIATRIC, 1017/2S       Discharge Date       Discharge Disposition       Discharge Destination                                 Attending Provider: Avelino Philippe MD    Allergies: Augmentin [Amoxicillin-pot Clavulanate], Latex, Phenergan [Promethazine], Seroquel [Quetiapine], Trazodone    Isolation: None   Infection: None   Code Status: CPR    Ht: 162.6 cm (64\")   Wt: 74.8 kg (165 lb)    Admission Cmt: None   Principal Problem: None                  Active Insurance as of 12/21/2023       Primary Coverage       Payor Plan Insurance Group Employer/Plan Group    [employer] [place] [employer] [place]ICA[place]        Payor Plan Address Payor Plan Phone Number Payor Plan Fax Number Effective Dates    [address] [address] [phone]  12/21/2023 - None Entered    [facility] [id number]         Subscriber Name Subscriber Birth Date Member ID       KAREN DAS 1995 [id number]                     Emergency Contacts            No emergency contacts on file.          PLEASE ATTACH THIS INFORMATION TO REFERENCE # [id number]. RETURN FAX: [phone]     NOTIFICATION OF INPATIENT BEHAVIORAL HEALTH ADMISSION   ADMISSION DATE: 12/21/2023  FACILITY: [facility] (NPI [id number]) (TAX ID [id number])  ATTENDING MD: AVELINO PHILIPPE (NPI [id number])  DIAGNOSIS PER H&P:  Other recurrent depressive disorders (F33.8)       U.R. CONTACT:   CHARISSA HORVATH   PHONE [phone] EXT. [phone]  [phone]    OR    MARIA T WILLIAM FOR CONCURRENT REVIEW  PHONE: [phone]  [phone]     "   Augie Philippe MD   Physician  Psychiatry     H&P      Signed     Date of Service: 12/22/23 0944  Creation Time: 12/22/23 0944     Signed       Expand All Collapse All                                                            Psychiatry Inpatient Initial Eval (H+P)     Clinician: Augie Philippe MD        CC:SI     HPI:   Patient was admitted on the unit on 12/21/2023 and was evaluated on 12/22/23   28 y.o. female presented to ER c/o worsening depression and SI. She denies drug use recently. She has been off her psychiatric medications for months.    Patient endorses depressed mood, sx of anhedonia, low energy, fatigue, decreased motivation. Onset: months ago.    Screening questions reveal a h/o prior episodes of depression.  No history of caleb or hypomania based on extensive screening questions posed by me today.         Available medical/psychiatric records reviewed and incorporated into the current document.     PAST PSYCHIATRIC HX:  Dx: depression, substance abuse  IP: Multiple previous psychiatric hospitalizations, last at [facility].Other admissions at [facility]   Previous meds: Risperidone, doxepin, aripiprazole 10 mg daily,  Fluoxetine 40 mg daily,  hydroxyzine 25 mg 3 times daily as needed  SH/SI/SA: hx of cutting/intermittent/reports attempting one time prior to this admission        SUBSTANCE USE HX:  History of methamphetamine abuse. Reports recently completing rehab at [facility]. Prior to that, left [facility] at some point in treatment.  Admission UDS +bzo; pt denies recent use     FAMILY HX: Patient vague about family history, no Suicides among first-degree relatives.     SOCIAL HX:  SOCIAL HX: Patient states that she was born in [place], [place]. Patient states that she was raised in [place], [place]. Patient states that she is currently homeless. Patient  but they are . Patient states that she has 2 children that are staying with the  paternal grandmother. Patient states that she is currently unemployed. Patient states that she has a highschool diploma. Patient states that she has legal issues.   Trauma: mother passed away 8-9 years ago, lost kids soon after they were born     Family History: Denies family history of mental illness           Allergies   Allergen Reactions    Augmentin [Amoxicillin-Pot Clavulanate] Anaphylaxis and Hives    Latex Anaphylaxis and Hives    Phenergan [Promethazine] Anaphylaxis and Hives    Seroquel [Quetiapine] Anaphylaxis and Hives    Trazodone Unknown (See Comments)       Seizures                     Medical History        Past Medical History:   Diagnosis Date    Anxiety      Brain tumor      Depression      Substance abuse      Suicidal intent      Suicide attempt                      Prior to Admission medications    Medication Sig Start Date End Date Taking? Authorizing Provider   cetirizine (zyrTEC) 10 MG tablet Take 1 tablet by mouth Daily.   12/22/23   Joseluis Lyle MD   escitalopram (LEXAPRO) 10 MG tablet Take 1 tablet by mouth Daily.   12/22/23   Joseluis Lyle MD   mirtazapine (Remeron) 30 MG tablet Take 1 tablet by mouth Every Night. Indications: Major Depressive Disorder 10/4/22 12/22/23   Houston Philippe MD   traZODone (DESYREL) 50 MG tablet Take 1 tablet by mouth At Night As Needed for Sleep. Indications: Trouble Sleeping 7/1/22 7/1/22   Augie Philippe MD   venlafaxine XR (EFFEXOR-XR) 75 MG 24 hr capsule Take 1 capsule by mouth Daily.   12/22/23   Joseluis Lyle MD         Temp:  [97.8 °F (36.6 °C)-98.3 °F (36.8 °C)] 98.3 °F (36.8 °C)  Heart Rate:  [66-86] 82  Resp:  [16-18] 16  BP: ()/(55-68) 93/55        Mental Status Exam  Mood: depressed  Affect: mood congruent  Thought Processes: linear  Thought Content: No delusions voiced  Hallucinations: Denies  Suicidal Thoughts: yes  Suicidal Plan/Intent: Denies  Hopelesness: Severe        Review of Systems    Constitutional:  Positive for activity change, appetite change and fatigue.   HENT: Negative.     Eyes: Negative.    Respiratory: Negative.     Cardiovascular: Negative.    Gastrointestinal: Negative.    Endocrine: Negative.    Genitourinary:  Positive for dysuria.   Musculoskeletal: Negative.    Skin: Negative.    Allergic/Immunologic: Negative.    Neurological: Negative.    Hematological: Negative.             Physical Exam:  General Appearance:    Alert, cooperative, in no acute distress   Head:    Normocephalic, without obvious abnormality, atraumatic   Eyes:            Lids and lashes normal, conjunctivae and sclerae normal, no   icterus, no pallor, corneas clear, PERRLA   Ears:    Ears appear intact with no abnormalities noted   Throat:   No oral lesions, no thrush, oral mucosa moist   Neck:   No adenopathy, supple, trachea midline, no thyromegaly, no     carotid bruit, no JVD   Back:     No kyphosis present, no scoliosis present, no skin lesions,       erythema or scars, no tenderness to percussion or                   palpation,   range of motion normal   Lungs:     Clear to auscultation,respirations regular, even and                   unlabored    Heart:    Regular rhythm and normal rate, normal S1 and S2, no            murmur, no gallop, no rub, no click   Breast Exam:    Deferred   Abdomen:     Normal bowel sounds, no masses, no organomegaly, soft, nontender, nondistended, no guarding, no rebound tenderness   Genitalia:    Deferred   Extremities:   Moves all extremities well, no edema, no cyanosis, no              redness   Pulses:   Pulses palpable and equal bilaterally   Skin:   No bleeding, bruising or rash   Lymph nodes:   No palpable adenopathy   Neurologic:   Cranial nerves 2 - 12 grossly intact, sensation intact, DTR        present and equal bilaterally      Exam completed within view of nursing staff.           Labs:  Lab Results (all)         None                Imaging:  Imaging Results (All)          None                   Diagnosis:  Suicidal Ideation   Other recurrent depressive disorders (HCC)        Hospital bed: No     Given the high risk and/or potentially life-threatening nature of patient's presenting symptoms, patient has been admitted for safety and stabilization and placed on the appropriate level of precautions.  Patient will be assigned a Master's level therapist and encouraged to participate in both individual and group therapy on the unit.  Routine labs have been ordered.     CODE STATUS: Full Code      Restart Remeron at 15mg with plan to titrate back to 30mg.  Will provide for supportive individual and group psychotherapeutic effort       Methamphetamine use disorder, severe, dependence (HCC)  Patient has longstanding substance abuse history, is homeless, consider residential chemical dependency program at time of discharge     Rule Out UTI  - UA suspicious, patient symptomatic (burning)  - Start macrobid              Further treatment and disposition planning will be developed prospectively.

## 2023-12-22 NOTE — H&P
Psychiatry Inpatient Initial Eval (H+P)    Clinician: Augie Philippe MD      CC:SI    HPI:   Patient was admitted on the unit on 12/21/2023 and was evaluated on 12/22/23   28 y.o. female presented to ER c/o worsening depression and SI. She denies drug use recently. She has been off her psychiatric medications for months.    Patient endorses depressed mood, sx of anhedonia, low energy, fatigue, decreased motivation. Onset: months ago.    Screening questions reveal a h/o prior episodes of depression.  No history of caleb or hypomania based on extensive screening questions posed by me today.       Available medical/psychiatric records reviewed and incorporated into the current document.     PAST PSYCHIATRIC HX:  Dx: depression, substance abuse  IP: Multiple previous psychiatric hospitalizations, last at [facility].Other admissions at [facility]   Previous meds: Risperidone, doxepin, aripiprazole 10 mg daily,  Fluoxetine 40 mg daily,  hydroxyzine 25 mg 3 times daily as needed  SH/SI/SA: hx of cutting/intermittent/reports attempting one time prior to this admission       SUBSTANCE USE HX:  History of methamphetamine abuse. Reports recently completing rehab at [facility]. Prior to that, [facility] at some point in treatment.  Admission UDS +bzo; pt denies recent use     FAMILY HX: Patient vague about family history, no Suicides among first-degree relatives.     SOCIAL HX:  SOCIAL HX: Patient states that she was born in [place], [place]. Patient states that she was raised in [place]. Patient states that she is currently homeless. Patient  but they are . Patient states that she has 2 children that are staying with the paternal grandmother. Patient states that she is currently unemployed. Patient states that she has a highschool diploma. Patient states that she has legal issues.   Trauma: mother passed  away 8-9 years ago, lost kids soon after they were born    Family History: Denies family history of mental illness    Allergies   Allergen Reactions    Augmentin [Amoxicillin-Pot Clavulanate] Anaphylaxis and Hives    Latex Anaphylaxis and Hives    Phenergan [Promethazine] Anaphylaxis and Hives    Seroquel [Quetiapine] Anaphylaxis and Hives    Trazodone Unknown (See Comments)     Seizures         Past Medical History:   Diagnosis Date    Anxiety     Brain tumor     Depression     Substance abuse     Suicidal intent     Suicide attempt        Prior to Admission medications    Medication Sig Start Date End Date Taking? Authorizing Provider   cetirizine (zyrTEC) 10 MG tablet Take 1 tablet by mouth Daily.  12/22/23  Joseluis Lyle MD   escitalopram (LEXAPRO) 10 MG tablet Take 1 tablet by mouth Daily.  12/22/23  Joseluis Lyle MD   mirtazapine (Remeron) 30 MG tablet Take 1 tablet by mouth Every Night. Indications: Major Depressive Disorder 10/4/22 12/22/23  Houston Philippe MD   traZODone (DESYREL) 50 MG tablet Take 1 tablet by mouth At Night As Needed for Sleep. Indications: Trouble Sleeping 7/1/22 7/1/22  Augie Philippe MD   venlafaxine XR (EFFEXOR-XR) 75 MG 24 hr capsule Take 1 capsule by mouth Daily.  12/22/23  Joseluis Lyle MD        Temp:  [97.8 °F (36.6 °C)-98.3 °F (36.8 °C)] 98.3 °F (36.8 °C)  Heart Rate:  [66-86] 82  Resp:  [16-18] 16  BP: ()/(55-68) 93/55      Mental Status Exam  Mood: depressed  Affect: mood congruent  Thought Processes: linear  Thought Content: No delusions voiced  Hallucinations: Denies  Suicidal Thoughts: yes  Suicidal Plan/Intent: Denies  Hopelesness: Severe      Review of Systems   Constitutional:  Positive for activity change, appetite change and fatigue.   HENT: Negative.     Eyes: Negative.    Respiratory: Negative.     Cardiovascular: Negative.    Gastrointestinal: Negative.    Endocrine: Negative.    Genitourinary:  Positive for dysuria.    Musculoskeletal: Negative.    Skin: Negative.    Allergic/Immunologic: Negative.    Neurological: Negative.    Hematological: Negative.           Physical Exam:  General Appearance:    Alert, cooperative, in no acute distress   Head:    Normocephalic, without obvious abnormality, atraumatic   Eyes:            Lids and lashes normal, conjunctivae and sclerae normal, no   icterus, no pallor, corneas clear, PERRLA   Ears:    Ears appear intact with no abnormalities noted   Throat:   No oral lesions, no thrush, oral mucosa moist   Neck:   No adenopathy, supple, trachea midline, no thyromegaly, no     carotid bruit, no JVD   Back:     No kyphosis present, no scoliosis present, no skin lesions,       erythema or scars, no tenderness to percussion or                   palpation,   range of motion normal   Lungs:     Clear to auscultation,respirations regular, even and                   unlabored    Heart:    Regular rhythm and normal rate, normal S1 and S2, no            murmur, no gallop, no rub, no click   Breast Exam:    Deferred   Abdomen:     Normal bowel sounds, no masses, no organomegaly, soft, nontender, nondistended, no guarding, no rebound tenderness   Genitalia:    Deferred   Extremities:   Moves all extremities well, no edema, no cyanosis, no              redness   Pulses:   Pulses palpable and equal bilaterally   Skin:   No bleeding, bruising or rash   Lymph nodes:   No palpable adenopathy   Neurologic:   Cranial nerves 2 - 12 grossly intact, sensation intact, DTR        present and equal bilaterally     Exam completed within view of nursing staff.        Labs:  Lab Results (all)       None            Imaging:  Imaging Results (All)       None              Diagnosis:  Suicidal Ideation   Other recurrent depressive disorders (HCC)       Hospital bed: No    Given the high risk and/or potentially life-threatening nature of patient's presenting symptoms, patient has been admitted for safety and stabilization and  placed on the appropriate level of precautions.  Patient will be assigned a Master's level therapist and encouraged to participate in both individual and group therapy on the unit.  Routine labs have been ordered.    CODE STATUS: Full Code     Restart Remeron at 15mg with plan to titrate back to 30mg.  Will provide for supportive individual and group psychotherapeutic effort       Methamphetamine use disorder, severe, dependence (HCC)  Patient has longstanding substance abuse history, is homeless, consider residential chemical dependency program at time of discharge    Rule Out UTI  - UA suspicious, patient symptomatic (burning)  - Start macrobid          Further treatment and disposition planning will be developed prospectively.

## 2023-12-22 NOTE — ED PROVIDER NOTES
Subjective   History of Present Illness  28-year-old female patient with a past medical history of depression, anxiety, and suicide attempts presents to the emergency room today with complaints of suicidal ideations.  Patient states she has been feeling like this for about a day now.  She also reports that she has right elbow pain.  She denies any injury.  Denies any swelling or redness.  She has no active plan of suicide.  She denies any homicidal ideations or hallucinations.  She denies any drug or alcohol abuse.    History provided by:  Patient   used: No        Review of Systems   Constitutional: Negative.    HENT: Negative.     Eyes: Negative.    Respiratory: Negative.     Cardiovascular: Negative.    Gastrointestinal: Negative.    Endocrine: Negative.    Genitourinary: Negative.    Musculoskeletal:         R elbow pain    Skin: Negative.    Allergic/Immunologic: Negative.    Neurological: Negative.    Hematological: Negative.    Psychiatric/Behavioral:  Positive for suicidal ideas.    All other systems reviewed and are negative.      Past Medical History:   Diagnosis Date    Anxiety     Brain tumor     Depression     Substance abuse     Suicidal intent     Suicide attempt        Allergies   Allergen Reactions    Augmentin [Amoxicillin-Pot Clavulanate] Anaphylaxis and Hives    Latex Anaphylaxis and Hives    Phenergan [Promethazine] Anaphylaxis and Hives    Seroquel [Quetiapine] Anaphylaxis and Hives    Trazodone Unknown (See Comments)     Seizures        Past Surgical History:   Procedure Laterality Date    TONSILLECTOMY         No family history on file.    Social History     Socioeconomic History    Marital status: Single    Number of children: 2    Highest education level: GED or equivalent   Tobacco Use    Smoking status: Every Day     Packs/day: 1.00     Years: 16.00     Additional pack years: 0.00     Total pack years: 16.00     Types: Cigarettes    Smokeless tobacco: Never    Tobacco  comments:     began smoking at the age of 10   Vaping Use    Vaping Use: Every day    Substances: Nicotine, Flavoring    Devices: Disposable   Substance and Sexual Activity    Alcohol use: Not Currently    Drug use: Yes     Types: Methamphetamines     Comment: suboxone    Sexual activity: Defer     Partners: Male           Objective   Physical Exam  Vitals and nursing note reviewed.   Constitutional:       Appearance: Normal appearance. She is normal weight.   HENT:      Head: Normocephalic and atraumatic.      Right Ear: External ear normal.      Left Ear: External ear normal.      Nose: Nose normal.      Mouth/Throat:      Mouth: Mucous membranes are moist.      Pharynx: Oropharynx is clear.   Eyes:      Extraocular Movements: Extraocular movements intact.      Conjunctiva/sclera: Conjunctivae normal.      Pupils: Pupils are equal, round, and reactive to light.   Cardiovascular:      Rate and Rhythm: Normal rate and regular rhythm.      Pulses: Normal pulses.      Heart sounds: Normal heart sounds.   Pulmonary:      Effort: Pulmonary effort is normal.      Breath sounds: Normal breath sounds.   Abdominal:      General: Abdomen is flat. Bowel sounds are normal.      Palpations: Abdomen is soft.   Musculoskeletal:         General: Normal range of motion.      Right elbow: Normal. No swelling, deformity, effusion or lacerations. Normal range of motion. No tenderness.      Left elbow: No swelling, deformity, effusion or lacerations. Normal range of motion. No tenderness.      Cervical back: Normal range of motion and neck supple.   Skin:     General: Skin is warm and dry.      Capillary Refill: Capillary refill takes less than 2 seconds.   Neurological:      General: No focal deficit present.      Mental Status: She is alert and oriented to person, place, and time. Mental status is at baseline.   Psychiatric:         Attention and Perception: Attention and perception normal.         Mood and Affect: Mood normal.          Speech: Speech normal.         Behavior: Behavior normal.         Thought Content: Thought content includes suicidal ideation. Thought content does not include suicidal plan.         Cognition and Memory: Cognition and memory normal.         Judgment: Judgment normal.         Procedures           ED Course  ED Course as of 12/22/23 0002   Thu Dec 21, 2023   2359 XR Elbow 3+ View Right [ML]      ED Course User Index  [ML] Lily Song PA                                   XR Elbow 3+ View Right    Result Date: 12/21/2023  Impression:  No acute abnormality  This report was finalized on 12/21/2023 11:49 PM by Jethro Garcia MD.               Medical Decision Making  28-year-old female patient with a past medical history of depression, anxiety, and suicide attempts presents to the emergency room today with complaints of suicidal ideations.  Patient states she has been feeling like this for about a day now.  She also reports that she has right elbow pain.  She denies any injury.  Denies any swelling or redness.  She has no active plan of suicide.  She denies any homicidal ideations or hallucinations.  She denies any drug or alcohol abuse.  ED stay has been uncomplicated.  She will be admitted to the psychiatric unit.    Problems Addressed:  Suicidal ideations: complicated acute illness or injury    Amount and/or Complexity of Data Reviewed  Labs: ordered.  Radiology: ordered. Decision-making details documented in ED Course.        Final diagnoses:   Suicidal ideations       ED Disposition  ED Disposition       ED Disposition   DC/Transfer to Behavioral Health Condition   --    Comment   --               No follow-up provider specified.       Medication List      No changes were made to your prescriptions during this visit.            Lily Song PA  12/22/23 0002

## 2023-12-22 NOTE — NURSING NOTE
Spoke to Dr. Philippe discussed labs, assessment, and vitals. New orders to admit pt AE1 routine orders SP3.   RBVOx2   ED provider notified.

## 2023-12-22 NOTE — PLAN OF CARE
Goal Outcome Evaluation:  Plan of Care Reviewed With: patient  Patient Agreement with Plan of Care: agrees  Consent Given to Review Plan with: Declined involvement.  Progress: improving  Outcome Evaluation: Therapist met with Patient to review care plan, social history, aftercare recommendations and disposition plans; Patient agreeable.    Problem: Adult Behavioral Health Plan of Care  Goal: Plan of Care Review  Outcome: Ongoing, Progressing  Flowsheets (Taken 12/22/2023 1055)  Consent Given to Review Plan with: Declined involvement.  Progress: improving  Plan of Care Reviewed With: patient  Patient Agreement with Plan of Care: agrees  Outcome Evaluation:   Therapist met with Patient to review care plan, social history, aftercare recommendations and disposition plans   Patient agreeable.     Problem: Adult Behavioral Health Plan of Care  Goal: Patient-Specific Goal (Individualization)  Outcome: Ongoing, Progressing  Flowsheets  Taken 12/22/2023 1055  Patient-Specific Goals (Include Timeframe): Patient will identify 2-3 healthy coping skills, complete safety plan, complete aftercare plan and deny SI/HI prior to discharge.  Individualized Care Needs: Therapist will offer 1-4 therapy sessions, safety planning, aftercare planning, family education, daily groups and brief CBT/MI interventions.  Anxieties, Fears or Concerns: None voiced.  Taken 12/22/2023 1049  Patient Personal Strengths:   expressive of emotions   expressive of needs   motivated for treatment   motivated for recovery   tolerant   resilient   resourceful   self-awareness   self-reliant  Patient Vulnerabilities:   lacks insight into illness   poor impulse control   family/relationship conflict   history of unsuccessful treatment   substance abuse/addiction   limited support system   housing insecurity   occupational insecurity     Problem: Adult Behavioral Health Plan of Care  Goal: Optimized Coping Skills in Response to Life Stressors  Outcome: Ongoing,  Progressing  Flowsheets (Taken 12/22/2023 1055)  Optimized Coping Skills in Response to Life Stressors: making progress toward outcome  Intervention: Promote Effective Coping Strategies  Flowsheets (Taken 12/22/2023 1055)  Supportive Measures:   active listening utilized   decision-making supported   positive reinforcement provided   verbalization of feelings encouraged     Problem: Adult Behavioral Health Plan of Care  Goal: Develops/Participates in Therapeutic Chloride to Support Successful Transition  Outcome: Ongoing, Progressing  Flowsheets (Taken 12/22/2023 1055)  Develops/Participates in Therapeutic Chloride to Support Successful Transition: making progress toward outcome  Intervention: Foster Therapeutic Chloride  Flowsheets (Taken 12/22/2023 1055)  Trust Relationship/Rapport:   care explained   questions answered   choices provided   questions encouraged   emotional support provided   reassurance provided   empathic listening provided   thoughts/feelings acknowledged  Intervention: Mutually Develop Transition Plan  Flowsheets (Taken 12/22/2023 1055)  Outpatient/Agency/Support Group Needs:   intensive outpatient services   outpatient medication management   outpatient counseling   residential services   outpatient psychiatric care (specify)   outpatient substance abuse treatment (specify)  Discharge Coordination/Progress:   Therapist met with Patient to complete a discharge needs assessment   Patient agreeable.  Transition Support:   community resources reviewed   follow-up care coordinated   follow-up care discussed   crisis management plan promoted   crisis management plan verbalized  Transportation Anticipated: family or friend will provide  Anticipated Discharge Disposition:   residential substance use unit   homeless  Transportation Concerns: no car  Current Discharge Risk:   substance use/abuse   psychiatric illness   homeless  Concerns to be Addressed:   mental health   suicidal   medication    coping/stress   adjustment to diagnosis/illness   substance/tobacco abuse/use   employment/school   homelessness  Readmission Within the Last 30 Days: no previous admission in last 30 days  Patient/Family Anticipated Services at Transition:   outpatient care   mental health services  Patient/Family Anticipates Transition to: (Sober living facility) other (see comments)    DATA: Therapist met individually with Patient this date for initial evaluation.  Introduced self as Therapist and the role of a positive therapeutic relationship; Patient agreeable.      Therapist encouraged Patient to speak openly and honestly about any issues or stressors during treatment stay. Therapist explained how open communication is significant to providing most effective care.      Therapist completed psychosocial assessment, integrated summary, reviewed care plans, disposition planning and discussed hospitalization expectations and treatment goals this date.     Therapist provided education regarding different levels of care and is recommending sob living for most appropriate aftercare. Patient agreeable. Patient signed consent for [facility].     Therapist is recommending family involvement prior to discharge and it's importance. Patient declined involvement.    Therapist attempted contact with the sober living facilities, no answer at this time.     1507:    Spoke with the [facility] who instructs Patient to contact them and complete a screening. Provided Patient with the number to call.     CLINICAL MANUVERING/INTERVENTIONS:  Assisted Patient in processing session content; acknowledged and normalized Patient’s thoughts, feelings, and concerns by utilizing a person-centered approach in efforts to build appropriate rapport and a positive therapeutic relationship with open and honest communication. Allowed Patient to ventilate regarding current stressors and triggers for negative emotions  and thoughts in a safe nonjudgmental environment with unconditional positive regard, active listening skills, and empathy.     ASSESSMENT: Anali Das is a 28-year-old  female who presented to the ED reporting SI. Patient was calm and cooperative but very restricted and vague about stressors. She states she wants to go to rehab but has not used anything in over 2 years. Patient did not want to state her drug of choice. She states she is homeless and has been staying with friends.     PLAN: Patient will receive 24/7 nursing monitoring and daily psychiatrist evaluation by a multidisciplinary team.    Patient will continue stabilization at this time.     Patient is agreeable for outpatient services with     Public assistance with transportation may be needed.

## 2023-12-22 NOTE — PLAN OF CARE
Goal Outcome Evaluation:  Plan of Care Reviewed With: patient  Patient Agreement with Plan of Care: agrees     Progress: no change  Outcome Evaluation: Pt admitted to unit, oriented to staff and unit rules

## 2023-12-22 NOTE — NURSING NOTE
Pt assessment complete. Pt states she came to intake because she is depressed. Pt states she has SI thoughts sometimes but has no plan. Pt was a poor historian, was brief on all questions, and didn't have much to say. Said she is homeless and maybe that's why she's depressed, she just don't know.  Pt denies HI/AVH.  Pt rates depression 10  Pt rates anxiety 8  Pt denies etoh and drugs

## 2023-12-22 NOTE — NURSING NOTE
Pt reports having SI without a plan. Denies HI/AVH. Rates anxiety 10/10, depression 8/10. Denies drug and alcohol use. Pt reports being homeless and would like to go to a rehab.

## 2023-12-23 LAB
BACTERIA UR QL AUTO: ABNORMAL /HPF
BILIRUB UR QL STRIP: NEGATIVE
C TRACH RRNA CVX QL NAA+PROBE: NOT DETECTED
CLARITY UR: ABNORMAL
COD CRY URNS QL: ABNORMAL /HPF
COLOR UR: ABNORMAL
GLUCOSE UR STRIP-MCNC: NEGATIVE MG/DL
HGB UR QL STRIP.AUTO: NEGATIVE
HYALINE CASTS UR QL AUTO: ABNORMAL /LPF
KETONES UR QL STRIP: NEGATIVE
LEUKOCYTE ESTERASE UR QL STRIP.AUTO: ABNORMAL
N GONORRHOEA RRNA SPEC QL NAA+PROBE: NOT DETECTED
NITRITE UR QL STRIP: NEGATIVE
PH UR STRIP.AUTO: 6 [PH] (ref 5–8)
PROT UR QL STRIP: NEGATIVE
RBC # UR STRIP: ABNORMAL /HPF
REF LAB TEST METHOD: ABNORMAL
SP GR UR STRIP: 1.03 (ref 1–1.03)
SQUAMOUS #/AREA URNS HPF: ABNORMAL /HPF
TRICHOMONAS VAGINALIS PCR: NOT DETECTED
UROBILINOGEN UR QL STRIP: ABNORMAL
WBC # UR STRIP: ABNORMAL /HPF

## 2023-12-23 PROCEDURE — 81001 URINALYSIS AUTO W/SCOPE: CPT | Performed by: PSYCHIATRY & NEUROLOGY

## 2023-12-23 PROCEDURE — 99232 SBSQ HOSP IP/OBS MODERATE 35: CPT | Performed by: PSYCHIATRY & NEUROLOGY

## 2023-12-23 PROCEDURE — 87491 CHLMYD TRACH DNA AMP PROBE: CPT | Performed by: PSYCHIATRY & NEUROLOGY

## 2023-12-23 PROCEDURE — 87591 N.GONORRHOEAE DNA AMP PROB: CPT | Performed by: PSYCHIATRY & NEUROLOGY

## 2023-12-23 PROCEDURE — 87661 TRICHOMONAS VAGINALIS AMPLIF: CPT | Performed by: PSYCHIATRY & NEUROLOGY

## 2023-12-23 RX ORDER — FLUOXETINE HYDROCHLORIDE 20 MG/1
20 CAPSULE ORAL DAILY
Status: DISCONTINUED | OUTPATIENT
Start: 2023-12-23 | End: 2023-12-25 | Stop reason: HOSPADM

## 2023-12-23 RX ORDER — OLANZAPINE 5 MG/1
5 TABLET ORAL NIGHTLY
Status: DISCONTINUED | OUTPATIENT
Start: 2023-12-23 | End: 2023-12-25 | Stop reason: HOSPADM

## 2023-12-23 RX ADMIN — OLANZAPINE 5 MG: 5 TABLET, FILM COATED ORAL at 20:33

## 2023-12-23 RX ADMIN — FLUOXETINE HYDROCHLORIDE 20 MG: 20 CAPSULE ORAL at 12:39

## 2023-12-23 RX ADMIN — NITROFURANTOIN MONOHYDRATE/MACROCRYSTALS 100 MG: 75; 25 CAPSULE ORAL at 09:30

## 2023-12-23 RX ADMIN — NITROFURANTOIN MONOHYDRATE/MACROCRYSTALS 100 MG: 75; 25 CAPSULE ORAL at 20:33

## 2023-12-23 NOTE — PLAN OF CARE
Goal Outcome Evaluation:  Plan of Care Reviewed With: patient  Patient Agreement with Plan of Care: agrees         Pt rated anxiety 0/10 and depression 0/10. Pt denies SI/HI/AVH. Pt doesn't voice any concerns at this time. No acute s/s of distress noted.

## 2023-12-23 NOTE — PROGRESS NOTES
"INPATIENT PSYCHIATRIC PROGRESS NOTE    Name:  Anali Das  :  1995  MRN:  [id number]  Visit Number:  [id number]  Length of stay:  2    SUBJECTIVE    CC/Focus of Exam: SI, psychosis    INTERVAL HISTORY:  First time seeing patient.  Chart, notes, vitals, labs and EKG personally reviewed.  Hep C antibody reactive, UA with 20-50 WBC and negative nitrite    Patient aloof, disorganized, with thought blocking and response lag on exam today.  Patient struggled to provide information regarding recent disturbance and SI, which culminated in her calling EMS to come to the hospital due to suicidal thoughts.  Patient struggled to complete assessment today due to current mental status.    Depression rating 7/10  Anxiety rating 7/10  Sleep: Fair  Withdrawal sx: Denied  Cravin/10    Review of Systems   Constitutional: Negative.    Respiratory: Negative.     Cardiovascular: Negative.    Gastrointestinal: Negative.    Musculoskeletal: Negative.    Psychiatric/Behavioral:  Positive for dysphoric mood, hallucinations and sleep disturbance. The patient is nervous/anxious.        OBJECTIVE    Temp:  [97.5 °F (36.4 °C)-98.9 °F (37.2 °C)] 98.9 °F (37.2 °C)  Heart Rate:  [63-82] 72  Resp:  [16-18] 16  BP: ()/(52-58) 94/52    MENTAL STATUS EXAM:  Appearance: Casually dressed, fair hygeine.   Cooperation: Guarded  Psychomotor: Mild psychomotor retardation, No EPS, No motor tics  Speech: Decreased rate, amount.  Mood: \"Not sure\"   Affect: congruent, restricted  Thought Content: possible delusional material present  Thought process: Disorganized, thought blocking and response lag  Suicidality: No significant engagement regarding SI that led her to the hospital  Homicidality: No HI  Perception: Possible AH/VH  Insight: Poor  Judgment: fair    Lab Results (last 24 hours)       Procedure Component Value Units Date/Time    Hepatitis Panel, Acute [[id number]]  (Abnormal) Collected: 23 1139    Specimen: Blood Updated: " 12/22/23 1401     Hepatitis B Surface Ag Non-Reactive     Hep A IgM Non-Reactive     Hep B C IgM Non-Reactive     Hepatitis C Ab Reactive    Narrative:      Results may be falsely decreased if patient taking Biotin.                Imaging Results (Last 24 Hours)       ** No results found for the last 24 hours. **               ECG/EMG Results (most recent)       Procedure Component Value Units Date/Time    ECG 12 Lead Other; Baseline Cardiac Status [[id number]] Collected: 12/22/23 0052     Updated: 12/22/23 1825     QT Interval 406 ms      QTC Interval 415 ms     Narrative:      Test Reason : Other~  Blood Pressure :   */*   mmHG  Vent. Rate :  63 BPM     Atrial Rate :  63 BPM     P-R Int : 176 ms          QRS Dur :  98 ms      QT Int : 406 ms       P-R-T Axes :  87  66  56 degrees     QTc Int : 415 ms    Normal sinus rhythm  Normal ECG  When compared with ECG of 28-SEP-2022 07:25,  No significant change was found  Confirmed by Dashawn Rouse ([id number]) on 12/22/2023 6:19:02 PM    Referred By:            Confirmed By: Dashawn Rouse             ALLERGIES: Augmentin [amoxicillin-pot clavulanate], Latex, Phenergan [promethazine], Seroquel [quetiapine], and Trazodone      Current Facility-Administered Medications:     acetaminophen (TYLENOL) tablet 650 mg, 650 mg, Oral, Q6H PRN, Augie Philippe MD    aluminum-magnesium hydroxide-simethicone (MAALOX MAX) 400-400-40 MG/5ML suspension 15 mL, 15 mL, Oral, Q6H PRN, Augie Philippe MD    benzonatate (TESSALON) capsule 100 mg, 100 mg, Oral, TID PRN, Augie Philippe MD    benztropine (COGENTIN) tablet 2 mg, 2 mg, Oral, Once PRN **OR** benztropine (COGENTIN) injection 1 mg, 1 mg, Intramuscular, Once PRN, Augie Philippe MD    famotidine (PEPCID) tablet 20 mg, 20 mg, Oral, BID PRN, Augie Philippe MD    hydrOXYzine (ATARAX) tablet 50 mg, 50 mg, Oral, Q6H PRN, Augie Philippe MD    ibuprofen (ADVIL,MOTRIN) tablet 400 mg, 400 mg, Oral, Q6H PRN, Augie Philippe MD     loperamide (IMODIUM) capsule 2 mg, 2 mg, Oral, Q2H PRN, Augie Philippe MD    magnesium hydroxide (MILK OF MAGNESIA) suspension 10 mL, 10 mL, Oral, Daily PRN, Augie Philippe MD    nitrofurantoin (macrocrystal-monohydrate) (MACROBID) capsule 100 mg, 100 mg, Oral, Q12H, Augie Philippe MD, 100 mg at 12/23/23 0930    ondansetron (ZOFRAN) tablet 4 mg, 4 mg, Oral, Q6H PRN, Augie Philippe MD    sodium chloride nasal spray 2 spray, 2 spray, Each Nare, PRN, Augie Philippe MD    Reviewed chart, notes, vitals, labs and EKG personally reviewed.    ASSESSMENT & PLAN:    Suicidal Ideation  -SI with plan  -Admit for crisis stabilization  -SP3    Unspecified psychotic disorder  -Patient disorganized, restricted, aloof, with thought blocking and response lag on exam today  -Begin olanzapine 5 mg nightly  -We will establish outpatient psychiatric care following hospitalization    Other recurrent depressive disorders  -Begin fluoxetine 20 mg daily  -Will provide for supportive individual and group psychotherapeutic effort       Methamphetamine use disorder, severe, dependence (HCC)  Patient has longstanding substance abuse history, is homeless, consider residential chemical dependency program at time of discharge     Rule Out UTI  - UA suspicious, patient symptomatic (burning)  -Order culture and STI testing  - Start macrobid    Hepatitis C  -Antibody reactive  -Transaminases within normal limits  -Defer to PCP    Special precautions: Special Precautions Level 3 (q15 min checks)     Behavioral Health Treatment Plan and Problem List: I have reviewed and approved the Behavioral Health Treatment Plan and Problem list.  The patient has had a chance to review and agrees with the treatment plan.    I have reviewed the copied text and it is accurate as of 12/23/23     Clinician:  Francis Aldrich MD  12/23/23  12:26 EST

## 2023-12-23 NOTE — PLAN OF CARE
Goal Outcome Evaluation:  Plan of Care Reviewed With: patient  Patient Agreement with Plan of Care: agrees     Progress: no change  Outcome Evaluation: Patient calm and cooperative. Patient continues to be withdrawn and isolate self in room. Rates depression a 1. Denies anxiety, SI/HI or AVH.

## 2023-12-24 LAB
GEN 5 2HR TROPONIN T REFLEX: <6 NG/L
NT-PROBNP SERPL-MCNC: 96.2 PG/ML (ref 0–450)
QT INTERVAL: 384 MS
QTC INTERVAL: 442 MS
TROPONIN T DELTA: NORMAL
TROPONIN T SERPL HS-MCNC: <6 NG/L

## 2023-12-24 PROCEDURE — 83880 ASSAY OF NATRIURETIC PEPTIDE: CPT | Performed by: PSYCHIATRY & NEUROLOGY

## 2023-12-24 PROCEDURE — 93010 ELECTROCARDIOGRAM REPORT: CPT | Performed by: INTERNAL MEDICINE

## 2023-12-24 PROCEDURE — 86141 C-REACTIVE PROTEIN HS: CPT | Performed by: PSYCHIATRY & NEUROLOGY

## 2023-12-24 PROCEDURE — 93005 ELECTROCARDIOGRAM TRACING: CPT | Performed by: PSYCHIATRY & NEUROLOGY

## 2023-12-24 PROCEDURE — 84484 ASSAY OF TROPONIN QUANT: CPT | Performed by: PSYCHIATRY & NEUROLOGY

## 2023-12-24 PROCEDURE — 99232 SBSQ HOSP IP/OBS MODERATE 35: CPT | Performed by: PSYCHIATRY & NEUROLOGY

## 2023-12-24 RX ORDER — NICOTINE 21 MG/24HR
1 PATCH, TRANSDERMAL 24 HOURS TRANSDERMAL
Status: DISCONTINUED | OUTPATIENT
Start: 2023-12-24 | End: 2023-12-24

## 2023-12-24 RX ADMIN — NITROFURANTOIN MONOHYDRATE/MACROCRYSTALS 100 MG: 75; 25 CAPSULE ORAL at 09:33

## 2023-12-24 RX ADMIN — HYDROXYZINE HYDROCHLORIDE 50 MG: 50 TABLET ORAL at 21:03

## 2023-12-24 RX ADMIN — OLANZAPINE 5 MG: 5 TABLET, FILM COATED ORAL at 21:02

## 2023-12-24 RX ADMIN — BENZONATATE 100 MG: 100 CAPSULE ORAL at 12:07

## 2023-12-24 RX ADMIN — FLUOXETINE HYDROCHLORIDE 20 MG: 20 CAPSULE ORAL at 09:33

## 2023-12-24 RX ADMIN — ACETAMINOPHEN 650 MG: 325 TABLET ORAL at 16:48

## 2023-12-24 RX ADMIN — NICOTINE TRANSDERMAL SYSTEM 1 PATCH: 21 PATCH, EXTENDED RELEASE TRANSDERMAL at 12:04

## 2023-12-24 RX ADMIN — NITROFURANTOIN MONOHYDRATE/MACROCRYSTALS 100 MG: 75; 25 CAPSULE ORAL at 21:02

## 2023-12-24 NOTE — NURSING NOTE
Pt complained mid-sternal, generalized chest pain. Pt unable to give anymore descriptors of pain. Pt does not appear in acute distress, VSS, pt denies radiation of pain, pt denies shortness of breath. MD notified. Chest pain protocol orders initiated. Dr. Aldrich concerned chest pain may be related to application of nicotine patch today, as pt was here for several days without one. MD stated to remove nicotine patch. RBVOx2.

## 2023-12-24 NOTE — PLAN OF CARE
"Goal Outcome Evaluation:  Plan of Care Reviewed With: patient  Patient Agreement with Plan of Care: agrees     Progress: improving  Outcome Evaluation: Pt states sleep and appetite \"good\", but pt has not eaten meals this day. Pt denies rates anxiety and depression 0/10, denies SI/HI, and AVH. Pt did shower this evening and came out to dayroom for a while. Pt calm and cooperative with staff.         "

## 2023-12-24 NOTE — PLAN OF CARE
Goal Outcome Evaluation:  Plan of Care Reviewed With: patient  Patient Agreement with Plan of Care: agrees        Outcome Evaluation: Patient reports appetite and sleep fair. Patient rates anxiety and depression 0/10. Patient denies SI, HI, AVH and pain this shift. Patient education:Self-Management  Coping Strategies  List strategies to cope with stressors and symptoms. Be aware of them and noticing patterns is an important first step. Techniques such as calm breathing, muscle relaxation, or going for a walk, and problem solving to manage stressors that can be controlled. Finding what works for the individual is important.  Patient stays in her room most of day and night with coming out only for food.

## 2023-12-24 NOTE — PROGRESS NOTES
"INPATIENT PSYCHIATRIC PROGRESS NOTE    Name:  Anali Das  :  1995  MRN:  [id number]  Visit Number:  [id number]  Length of stay:  3    SUBJECTIVE    CC/Focus of Exam: SI, psychosis    INTERVAL HISTORY:  Patient with some improvement in mental status, thought process, engagement with conversation.  She reports mood is improving, denies hallucination.  No observed responding to internal stimuli or hallucinatory behavior.  She is still mildly aloof, disorganized, with some improvement in thought blocking and response lag on exam today.     Depression rating 5/10  Anxiety rating 5/10  Sleep: Fair  Withdrawal sx: Denied  Cravin/10    Review of Systems   Constitutional: Negative.    Respiratory: Negative.     Cardiovascular: Negative.    Gastrointestinal: Negative.    Musculoskeletal: Negative.    Psychiatric/Behavioral:  Positive for dysphoric mood and sleep disturbance. The patient is nervous/anxious.        OBJECTIVE    Temp:  [97.8 °F (36.6 °C)-98.6 °F (37 °C)] 97.8 °F (36.6 °C)  Heart Rate:  [47-67] 47  Resp:  [17-18] 18  BP: ()/(50-65) 88/50    MENTAL STATUS EXAM:  Appearance: Casually dressed, fair hygeine.   Cooperation: More cooperative  Psychomotor: Some improvement in psychomotor retardation, No EPS, No motor tics  Speech: Mildly improved rate, amount.  Mood: \"A little better today\"   Affect: congruent, restricted  Thought Content: No abject delusional material present  Thought process: More organized, some improvement in thought blocking and response lag  Suicidality: Denies SI  Homicidality: No HI  Perception: Possible AH/VH  Insight: Poor  Judgment: fair    Lab Results (last 24 hours)       Procedure Component Value Units Date/Time    Chlamydia trachomatis, Neisseria gonorrhoeae, Trichomonas vaginalis, PCR - Urine, Urine, Clean Catch [[id number]]  (Normal) Collected: 23 1830    Specimen: Urine, Clean Catch Updated: 23     Chlamydia DNA by PCR Not Detected     Neisseria " gonorrhoeae by PCR Not Detected     Trichomonas vaginalis PCR Not Detected    Urinalysis, Microscopic Only - Urine, Clean Catch [[id number]]  (Abnormal) Collected: 12/23/23 1830    Specimen: Urine, Clean Catch Updated: 12/23/23 1905     RBC, UA 0-2 /HPF      WBC, UA 3-5 /HPF      Comment: Urine culture not indicated.        Bacteria, UA 3+ /HPF      Squamous Epithelial Cells, UA 7-12 /HPF      Hyaline Casts, UA 0-2 /LPF      Calcium Oxalate Crystals, UA Small/1+ /HPF      Methodology Manual Light Microscopy    Urinalysis With Culture If Indicated - Urine, Clean Catch [[id number]]  (Abnormal) Collected: 12/23/23 1830    Specimen: Urine, Clean Catch Updated: 12/23/23 1854     Color, UA Dark Yellow     Appearance, UA Cloudy     pH, UA 6.0     Specific Gravity, UA 1.026     Glucose, UA Negative     Ketones, UA Negative     Bilirubin, UA Negative     Blood, UA Negative     Protein, UA Negative     Leuk Esterase, UA Moderate (2+)     Nitrite, UA Negative     Urobilinogen, UA 0.2 E.U./dL    Narrative:      In absence of clinical symptoms, the presence of pyuria, bacteria, and/or nitrites on the urinalysis result does not correlate with infection.               Imaging Results (Last 24 Hours)       ** No results found for the last 24 hours. **               ECG/EMG Results (most recent)       Procedure Component Value Units Date/Time    ECG 12 Lead Other; Baseline Cardiac Status [[id number]] Collected: 12/22/23 0052     Updated: 12/22/23 1825     QT Interval 406 ms      QTC Interval 415 ms     Narrative:      Test Reason : Other~  Blood Pressure :   */*   mmHG  Vent. Rate :  63 BPM     Atrial Rate :  63 BPM     P-R Int : 176 ms          QRS Dur :  98 ms      QT Int : 406 ms       P-R-T Axes :  87  66  56 degrees     QTc Int : 415 ms    Normal sinus rhythm  Normal ECG  When compared with ECG of 28-SEP-2022 07:25,  No significant change was found  Confirmed by Dashawn Rouse ([id number]) on 12/22/2023 6:19:02 PM    Referred By:             Confirmed By: Dashawn Rouse             ALLERGIES: Augmentin [amoxicillin-pot clavulanate], Latex, Phenergan [promethazine], Seroquel [quetiapine], and Trazodone      Current Facility-Administered Medications:     acetaminophen (TYLENOL) tablet 650 mg, 650 mg, Oral, Q6H PRN, Augie Philippe MD    aluminum-magnesium hydroxide-simethicone (MAALOX MAX) 400-400-40 MG/5ML suspension 15 mL, 15 mL, Oral, Q6H PRN, Augie Philippe MD    benzonatate (TESSALON) capsule 100 mg, 100 mg, Oral, TID PRN, Augie Philippe MD    benztropine (COGENTIN) tablet 2 mg, 2 mg, Oral, Once PRN **OR** benztropine (COGENTIN) injection 1 mg, 1 mg, Intramuscular, Once PRN, Augie Philippe MD    famotidine (PEPCID) tablet 20 mg, 20 mg, Oral, BID PRN, Augie Philippe MD    FLUoxetine (PROzac) capsule 20 mg, 20 mg, Oral, Daily, Francis Aldrich MD, 20 mg at 12/24/23 0933    hydrOXYzine (ATARAX) tablet 50 mg, 50 mg, Oral, Q6H PRN, Augie Philippe MD    ibuprofen (ADVIL,MOTRIN) tablet 400 mg, 400 mg, Oral, Q6H PRN, Augie Philippe MD    loperamide (IMODIUM) capsule 2 mg, 2 mg, Oral, Q2H PRN, Augie Philippe MD    magnesium hydroxide (MILK OF MAGNESIA) suspension 10 mL, 10 mL, Oral, Daily PRN, uAgie Philippe MD    nitrofurantoin (macrocrystal-monohydrate) (MACROBID) capsule 100 mg, 100 mg, Oral, Q12H, Augie Philippe MD, 100 mg at 12/24/23 0933    OLANZapine (zyPREXA) tablet 5 mg, 5 mg, Oral, Nightly, Francis Aldrich MD, 5 mg at 12/23/23 2033    ondansetron (ZOFRAN) tablet 4 mg, 4 mg, Oral, Q6H PRN, Augie Philippe MD    sodium chloride nasal spray 2 spray, 2 spray, Each Nare, PRSUZE, Augie Philippe MD    Reviewed chart, notes, vitals, labs and EKG personally reviewed.    ASSESSMENT & PLAN:    Suicidal Ideation  -SI with plan  -Admit for crisis stabilization  -SP3    Unspecified psychotic disorder  -Patient disorganized, restricted, aloof, with thought blocking and response lag on exam today  -Began olanzapine 5 mg nightly on  12/23/2023  -We will establish outpatient psychiatric care following hospitalization    Other recurrent depressive disorders  -Begin fluoxetine 20 mg daily  -Will provide for supportive individual and group psychotherapeutic effort       Methamphetamine use disorder, severe, dependence (HCC)  Patient has longstanding substance abuse history, is homeless, consider residential chemical dependency program at time of discharge     Rule Out UTI  - UA suspicious, patient symptomatic (burning)  -Appears culture was not plated   -STI testing negative  -Started macrobid on admission    Hepatitis C  -Antibody reactive  -Transaminases within normal limits  -Defer to PCP    Special precautions: Special Precautions Level 3 (q15 min checks)     Behavioral Health Treatment Plan and Problem List: I have reviewed and approved the Behavioral Health Treatment Plan and Problem list.  The patient has had a chance to review and agrees with the treatment plan.    I have reviewed the copied text and it is accurate as of 12/24/23     Clinician:  Francis Aldrich MD  12/24/23  10:20 EST

## 2023-12-24 NOTE — NURSING NOTE
Attempted contact with on call MD listed as Dr. Arenas. Dr Arenas states he and Dr. Philippe traded call and Dr. Philippe on tonight.

## 2023-12-25 VITALS
RESPIRATION RATE: 18 BRPM | OXYGEN SATURATION: 98 % | DIASTOLIC BLOOD PRESSURE: 64 MMHG | WEIGHT: 165 LBS | BODY MASS INDEX: 28.17 KG/M2 | HEART RATE: 57 BPM | TEMPERATURE: 97.8 F | HEIGHT: 64 IN | SYSTOLIC BLOOD PRESSURE: 104 MMHG

## 2023-12-25 PROBLEM — B19.20 HEPATITIS C: Status: ACTIVE | Noted: 2023-12-25

## 2023-12-25 PROBLEM — R45.851 SUICIDAL IDEATION: Status: RESOLVED | Noted: 2023-12-22 | Resolved: 2023-12-25

## 2023-12-25 LAB — CRP SERPL-MCNC: 1.07 MG/DL (ref 0.01–0.5)

## 2023-12-25 PROCEDURE — 99239 HOSP IP/OBS DSCHRG MGMT >30: CPT | Performed by: PSYCHIATRY & NEUROLOGY

## 2023-12-25 RX ORDER — NITROFURANTOIN 25; 75 MG/1; MG/1
100 CAPSULE ORAL EVERY 12 HOURS SCHEDULED
Qty: 7 CAPSULE | Refills: 0 | Status: SHIPPED | OUTPATIENT
Start: 2023-12-25 | End: 2023-12-29

## 2023-12-25 RX ORDER — FLUOXETINE HYDROCHLORIDE 20 MG/1
20 CAPSULE ORAL DAILY
Qty: 30 CAPSULE | Refills: 0 | Status: SHIPPED | OUTPATIENT
Start: 2023-12-26

## 2023-12-25 RX ORDER — OLANZAPINE 5 MG/1
5 TABLET ORAL NIGHTLY
Qty: 30 TABLET | Refills: 0 | Status: SHIPPED | OUTPATIENT
Start: 2023-12-25

## 2023-12-25 RX ADMIN — FLUOXETINE HYDROCHLORIDE 20 MG: 20 CAPSULE ORAL at 09:02

## 2023-12-25 RX ADMIN — NITROFURANTOIN MONOHYDRATE/MACROCRYSTALS 100 MG: 75; 25 CAPSULE ORAL at 09:02

## 2023-12-25 NOTE — PROGRESS NOTES
Psychiatry/Social Work    Patient Name:  Anali Das  YOB: 1995  MRN: [id number]  Admit Date:  12/21/2023    Therapist met with Patient who is requesting discharge.     Patient initially wanted to return home with family and provided the name and neighbor of a family member by the name of Rosettaveronica. Therapist spoke with Dipikacheloveronica at [phone] who states Patient can stay with her at discharge. Therapist completed safety planning and Arabella confirmed Patient has no access to any firearms in the home and states the home is safeguarded to the best possible extent.     Patient later changed her mind again and requested the [facility] be contacted. Therapist and Patient spoke with the [facility], per Lily Chamorro, who states they can accept Patient on this date and will pick her up. Made Dr. Aldrich aware.     Patient is being discharged to the [facility].     Electronically signed by:  TAYLOR Napoles  12/25/23 11:38 EST

## 2023-12-25 NOTE — DISCHARGE INSTR - APPOINTMENTS
[facility] [place] [id number]  [phone]    Admit today, December 25, 2023.

## 2023-12-25 NOTE — PROGRESS NOTES
"INPATIENT PSYCHIATRIC PROGRESS NOTE    Name:  Anali Das  :  1995  MRN:  [id number]  Visit Number:  [id number]  Length of stay:  4    SUBJECTIVE    CC/Focus of Exam: SI, psychosis    INTERVAL HISTORY:  Patient with no significant changes today. No apparent hallucinations, responding to internal stimuli, but still with mild response lag, thought blocking, paucity of thought, appearing aimless at times. Encouraged her to socialize and call family today.     Depression rating 5/10  Anxiety rating 4/10  Sleep: Fair  Withdrawal sx: Denied  Cravin/10    Review of Systems   Constitutional: Negative.    Respiratory: Negative.     Cardiovascular: Negative.    Gastrointestinal: Negative.    Musculoskeletal: Negative.    Psychiatric/Behavioral:  Positive for dysphoric mood and sleep disturbance. The patient is nervous/anxious.        OBJECTIVE    Temp:  [97.8 °F (36.6 °C)-98.2 °F (36.8 °C)] 97.8 °F (36.6 °C)  Heart Rate:  [57-92] 57  Resp:  [18] 18  BP: ()/(58-77) 104/64    MENTAL STATUS EXAM:  Appearance: Casually dressed, fair hygeine.   Cooperation: More cooperative  Psychomotor: Some improvement in psychomotor retardation, No EPS, No motor tics  Speech: Mildly improved rate, amount.  Mood: \"fine\"   Affect: congruent, restricted  Thought Content: No abject delusional material present  Thought process: More organized, continued mild thought blocking and response lag  Suicidality: Denies SI  Homicidality: No HI  Perception: Possible AH/VH  Insight: Poor  Judgment: fair    Lab Results (last 24 hours)       Procedure Component Value Units Date/Time    High Sensitivity Troponin T 2Hr [[id number]] Collected: 23    Specimen: Blood Updated: 23     HS Troponin T <6 ng/L      Troponin T Delta --     Comment: Unable to calculate.       Narrative:      High Sensitive Troponin T Reference Range:  <14.0 ng/L- Negative Female for AMI  <22.0 ng/L- Negative Male for AMI  >=14 - Abnormal Female " indicating possible myocardial injury.  >=22 - Abnormal Male indicating possible myocardial injury.   Clinicians would have to utilize clinical acumen, EKG, Troponin, and serial changes to determine if it is an Acute Myocardial Infarction or myocardial injury due to an underlying chronic condition.         High Sensitivity Troponin T [[id number]]  (Normal) Collected: 12/24/23 1724    Specimen: Blood Updated: 12/24/23 1814     HS Troponin T <6 ng/L     Narrative:      High Sensitive Troponin T Reference Range:  <14.0 ng/L- Negative Female for AMI  <22.0 ng/L- Negative Male for AMI  >=14 - Abnormal Female indicating possible myocardial injury.  >=22 - Abnormal Male indicating possible myocardial injury.   Clinicians would have to utilize clinical acumen, EKG, Troponin, and serial changes to determine if it is an Acute Myocardial Infarction or myocardial injury due to an underlying chronic condition.         BNP [[id number]]  (Normal) Collected: 12/24/23 1724    Specimen: Blood Updated: 12/24/23 1814     proBNP 96.2 pg/mL     Narrative:      This assay is used as an aid in the diagnosis of individuals suspected of having heart failure. It can be used as an aid in the diagnosis of acute decompensated heart failure (ADHF) in patients presenting with signs and symptoms of ADHF to the emergency department (ED). In addition, NT-proBNP of <300 pg/mL indicates ADHF is not likely.    Age Range Result Interpretation  NT-proBNP Concentration (pg/mL:      <50             Positive            >450                   Gray                 300-450                    Negative             <300    50-75           Positive            >900                  Gray                300-900                  Negative            <300      >75             Positive            >1800                  Gray                300-1800                  Negative            <300    High Sensitivity CRP [[id number]] Collected: 12/24/23 1724    Specimen: Blood  Updated: 12/24/23 1753               Imaging Results (Last 24 Hours)       ** No results found for the last 24 hours. **               ECG/EMG Results (most recent)       Procedure Component Value Units Date/Time    ECG 12 Lead Other; Baseline Cardiac Status [[id number]] Collected: 12/22/23 0052     Updated: 12/22/23 1825     QT Interval 406 ms      QTC Interval 415 ms     Narrative:      Test Reason : Other~  Blood Pressure :   */*   mmHG  Vent. Rate :  63 BPM     Atrial Rate :  63 BPM     P-R Int : 176 ms          QRS Dur :  98 ms      QT Int : 406 ms       P-R-T Axes :  87  66  56 degrees     QTc Int : 415 ms    Normal sinus rhythm  Normal ECG  When compared with ECG of 28-SEP-2022 07:25,  No significant change was found  Confirmed by Dashawn Rouse ([id number]) on 12/22/2023 6:19:02 PM    Referred By:            Confirmed By: Dashawn Rouse    ECG 12 Lead Chest Pain [[id number]] Collected: 12/24/23 1709     Updated: 12/24/23 2334     QT Interval 384 ms      QTC Interval 442 ms     Narrative:      Test Reason : Chest Pain  Blood Pressure :   */*   mmHG  Vent. Rate :  80 BPM     Atrial Rate :  80 BPM     P-R Int : 186 ms          QRS Dur : 106 ms      QT Int : 384 ms       P-R-T Axes :  71  63  63 degrees     QTc Int : 442 ms    Normal sinus rhythm  Incomplete right bundle branch block  Borderline ECG  When compared with ECG of 22-DEC-2023 00:52,  No significant change was found  Confirmed by Dashawn Rouse ([id number]) on 12/24/2023 11:33:56 PM    Referred By:            Confirmed By: Dashawn Rouse             ALLERGIES: Augmentin [amoxicillin-pot clavulanate], Latex, Phenergan [promethazine], Seroquel [quetiapine], and Trazodone      Current Facility-Administered Medications:     acetaminophen (TYLENOL) tablet 650 mg, 650 mg, Oral, Q6H PRN, Augie Philippe MD, 650 mg at 12/24/23 1648    aluminum-magnesium hydroxide-simethicone (MAALOX MAX) 400-400-40 MG/5ML suspension 15 mL, 15 mL, Oral, Q6H PRN, Augie Philippe MD     benzonatate (TESSALON) capsule 100 mg, 100 mg, Oral, TID PRN, Augie Philippe MD, 100 mg at 12/24/23 1207    benztropine (COGENTIN) tablet 2 mg, 2 mg, Oral, Once PRN **OR** benztropine (COGENTIN) injection 1 mg, 1 mg, Intramuscular, Once PRN, Augie Philippe MD    famotidine (PEPCID) tablet 20 mg, 20 mg, Oral, BID PRN, Augie Philippe MD    FLUoxetine (PROzac) capsule 20 mg, 20 mg, Oral, Daily, Francis Aldrich MD, 20 mg at 12/25/23 0902    hydrOXYzine (ATARAX) tablet 50 mg, 50 mg, Oral, Q6H PRN, Augie Philippe MD, 50 mg at 12/24/23 2103    ibuprofen (ADVIL,MOTRIN) tablet 400 mg, 400 mg, Oral, Q6H PRN, Augie Philippe MD    loperamide (IMODIUM) capsule 2 mg, 2 mg, Oral, Q2H PRN, Augie Philippe MD    magnesium hydroxide (MILK OF MAGNESIA) suspension 10 mL, 10 mL, Oral, Daily PRN, Augie Philippe MD    nitrofurantoin (macrocrystal-monohydrate) (MACROBID) capsule 100 mg, 100 mg, Oral, Q12H, Augie Philippe MD, 100 mg at 12/25/23 0902    OLANZapine (zyPREXA) tablet 5 mg, 5 mg, Oral, Nightly, Francis Aldrich MD, 5 mg at 12/24/23 2102    ondansetron (ZOFRAN) tablet 4 mg, 4 mg, Oral, Q6H PRN, Augie Philippe MD    sodium chloride nasal spray 2 spray, 2 spray, Each Nare, CRISTINA, Augie Philippe MD    Reviewed chart, notes, vitals, labs and EKG personally reviewed.    ASSESSMENT & PLAN:    Suicidal Ideation  -SI with plan  -Admit for crisis stabilization  -SP3    Unspecified psychotic disorder  -Patient disorganized, restricted, aloof, with thought blocking and response lag on exam today  -Began olanzapine 5 mg nightly on 12/23/2023  -We will establish outpatient psychiatric care following hospitalization    Other recurrent depressive disorders  -Began fluoxetine 20 mg daily on 12/23/23  -Will provide for supportive individual and group psychotherapeutic effort       Methamphetamine use disorder, severe, dependence (HCC)  Patient has longstanding substance abuse history, is homeless, consider residential  chemical dependency program at time of discharge     Rule Out UTI  - UA suspicious, patient symptomatic (burning)  -Appears culture was not plated   -STI testing negative  -Started macrobid on admission    Hepatitis C  -Antibody reactive  -Transaminases within normal limits  -Defer to PCP    Special precautions: Special Precautions Level 3 (q15 min checks)     Behavioral Health Treatment Plan and Problem List: I have reviewed and approved the Behavioral Health Treatment Plan and Problem list.  The patient has had a chance to review and agrees with the treatment plan.    I have reviewed the copied text and it is accurate as of 12/25/23     Clinician:  Francis Aldrich MD  12/25/23  09:36 EST

## 2023-12-25 NOTE — PLAN OF CARE
Goal Outcome Evaluation:  Plan of Care Reviewed With: patient  Patient Agreement with Plan of Care: agrees     Progress: improving  Outcome Evaluation: Patient has been calm and cooperative. She spends free time in her room. Patient denies Anxiety and depression. She denies SI, HI and AVH.

## 2023-12-26 NOTE — PROGRESS NOTES
Behavioral Health Discharge Summary             Please fax within 24 hours of discharge to [facility] at: [phone]      Member Name: Anali Das Member ID: [id number]   Authorization Number: [id number] Phone: [phone]   Member Address: Homeless    Discharge Date: 12/25/2023 Level of Care at Discharge:    Facility: [facility] Staff Completing Form: Katrina GLOVER   If the member is being discharged directly to a residential or extended care program, please specify the type below.   __Private Child-Caring Facility (PCC) Residential/Group Home   __Private Child-Caring Facility (PCC) Therapeutic Foster Care   __Residential Treatment Facility (RTF)   __Psychiatric Residential Treatment Facility (PRTF I or II)   __Long-Term Acute Inpatient Hospital Services or Extended Care Unit (ECU)   __Other (please specify):    Brief discharge summary of treatment received (for follow up by the case management team): D/C clinical with list of medications and follow up appts given to patient upon discharge.     BRIEF SUMMARY OF RECOMMENDATIONS FOR ONGOING TREATMENT     Discharged to where: [facility]    Discharge diagnoses: F 33.3   Axis I:    Axis II:    Axis III:    Axis IV:    Axis V:    Does the member understand his/her DX?  Yes          Medication     Dose     Schedule Supply/  Quantity  Given at Discharge RX Provided  Yes/No  If Rx Provided, Quantity RX Prior Auth Required  Yes/No Prior Auth  Completed   Prozac 20 mg  1 tab po Daily         Zyprexa  5 mg  1 tab PO every night                                                                               Does the member understand the reason for taking these medications? Yes                                                           FOLLOW-UP APPOINTMENTS   Please schedule within 7 days of discharge and provide appointment details for all referred services.    PCP/Other Providers Involved in Treatment:     Appointment Type:  [facility]   Provider Name: Martín Oliver  Provider Phone: [phone] Appointment Date: 12/25/2023 Appointment Time:  Admit     Assessment   (new to OP services)        Case Management    Is the member already enrolled in case management?  Yes/No  If yes, date the CM was notified:    If no, was the CM referral offered?  Yes/No  Accepted? Yes/No    Is the Release of Information in the chart? Yes/No:      Medication Management (for member discharged with psychiatric medications):      A&D Treatment (for member with substance abuse/   dependence in the past year):      Medical Condition (for member with a medical condition):    Other recommended treatment:    Do you have any concerns about the discharge plan?  No    If yes, explain:    Was the member involved in the discharge planning?  Yes    If no, explain:    Was a copy of the discharge plan provided to the member?  Yes    If no, explain:

## 2023-12-26 NOTE — DISCHARGE SUMMARY
"      PSYCHIATRIC DISCHARGE SUMMARY     Patient Identification:  Name:  Anali Das  Age:  28 y.o.  Sex:  female  :  1995  MRN:  [id number]  Visit Number:  [id number]    Date of Admission:2023   Date of Discharge: 2023     Discharge Diagnosis:  Active Problems:    Major depressive disorder, recurrent episode, severe, with psychosis    Methamphetamine use disorder, severe, dependence    Hepatitis C      Admission Diagnosis:  Suicidal ideation [R45.851]     Hospital Course  Patient is a 28 y.o. female presented with depression, SI and concern for psychosis.  Admitted for crisis stabilization.  Admission labs with hep C antibody positive and UA with 20-50 WBC and negative nitrite.  Patient exhibited depressive and psychotic symptoms, primarily disorganization, paucity of thought, response lag and thought blocking.  Patient was started on fluoxetine 20 mg daily and olanzapine 5 mg nightly.  Symptoms improved to the degree that patient denied further suicidality and exhibited no acutely concerning psychotic symptoms that would suggest danger to herself or others.  Patient voiced improvement, denied SI and readiness to return home for the holiday.  Family was contacted and were agreeable for patient to return home.  Treatment and safe discharge planning completed.    By the conclusion of this hospitalization, patient is exhibiting no acutely concerning symptoms of mood, psychotic or thought disorder that would necessitate further inpatient care. Patient is also denying SI, HI, and AVH. Patient has shown improvement of presenting symptoms, exhibited no behavior concerning for harm to self or others, and is considered appropriate for discharge to a lower level of care today. Treatment and safe discharge planning completed. Outpatient care ascertained.     Mental Status Exam upon discharge:   Mood \"better\"   Affect-congruent, appropriate, stable  Thought Content-goal directed, no delusional material " present  Thought process-linear, organized.  Suicidality: No SI  Homicidality: No HI  Perception: No AH/VH    Procedures Performed         Consults:   Consults       No orders found from 11/22/2023 to 12/22/2023.            Pertinent Test Results:   Lab Results (last 7 days)       Procedure Component Value Units Date/Time    High Sensitivity Troponin T 2Hr [[id number]] Collected: 12/24/23 1933    Specimen: Blood Updated: 12/24/23 2010     HS Troponin T <6 ng/L      Troponin T Delta --     Comment: Unable to calculate.       Narrative:      High Sensitive Troponin T Reference Range:  <14.0 ng/L- Negative Female for AMI  <22.0 ng/L- Negative Male for AMI  >=14 - Abnormal Female indicating possible myocardial injury.  >=22 - Abnormal Male indicating possible myocardial injury.   Clinicians would have to utilize clinical acumen, EKG, Troponin, and serial changes to determine if it is an Acute Myocardial Infarction or myocardial injury due to an underlying chronic condition.         High Sensitivity Troponin T [[id number]]  (Normal) Collected: 12/24/23 1724    Specimen: Blood Updated: 12/24/23 1814     HS Troponin T <6 ng/L     Narrative:      High Sensitive Troponin T Reference Range:  <14.0 ng/L- Negative Female for AMI  <22.0 ng/L- Negative Male for AMI  >=14 - Abnormal Female indicating possible myocardial injury.  >=22 - Abnormal Male indicating possible myocardial injury.   Clinicians would have to utilize clinical acumen, EKG, Troponin, and serial changes to determine if it is an Acute Myocardial Infarction or myocardial injury due to an underlying chronic condition.         BNP [[id number]]  (Normal) Collected: 12/24/23 1724    Specimen: Blood Updated: 12/24/23 1814     proBNP 96.2 pg/mL     Narrative:      This assay is used as an aid in the diagnosis of individuals suspected of having heart failure. It can be used as an aid in the diagnosis of acute decompensated heart failure (ADHF) in patients presenting with  signs and symptoms of ADHF to the emergency department (ED). In addition, NT-proBNP of <300 pg/mL indicates ADHF is not likely.    Age Range Result Interpretation  NT-proBNP Concentration (pg/mL:      <50             Positive            >450                   Gray                 300-450                    Negative             <300    50-75           Positive            >900                  Gray                300-900                  Negative            <300      >75             Positive            >1800                  Gray                300-1800                  Negative            <300    Chlamydia trachomatis, Neisseria gonorrhoeae, Trichomonas vaginalis, PCR - Urine, Urine, Clean Catch [[id number]]  (Normal) Collected: 12/23/23 1830    Specimen: Urine, Clean Catch Updated: 12/23/23 2011     Chlamydia DNA by PCR Not Detected     Neisseria gonorrhoeae by PCR Not Detected     Trichomonas vaginalis PCR Not Detected    Urinalysis, Microscopic Only - Urine, Clean Catch [[id number]]  (Abnormal) Collected: 12/23/23 1830    Specimen: Urine, Clean Catch Updated: 12/23/23 1905     RBC, UA 0-2 /HPF      WBC, UA 3-5 /HPF      Comment: Urine culture not indicated.        Bacteria, UA 3+ /HPF      Squamous Epithelial Cells, UA 7-12 /HPF      Hyaline Casts, UA 0-2 /LPF      Calcium Oxalate Crystals, UA Small/1+ /HPF      Methodology Manual Light Microscopy    Urinalysis With Culture If Indicated - Urine, Clean Catch [[id number]]  (Abnormal) Collected: 12/23/23 1830    Specimen: Urine, Clean Catch Updated: 12/23/23 1854     Color, UA Dark Yellow     Appearance, UA Cloudy     pH, UA 6.0     Specific Gravity, UA 1.026     Glucose, UA Negative     Ketones, UA Negative     Bilirubin, UA Negative     Blood, UA Negative     Protein, UA Negative     Leuk Esterase, UA Moderate (2+)     Nitrite, UA Negative     Urobilinogen, UA 0.2 E.U./dL    Narrative:      In absence of clinical symptoms, the presence of pyuria, bacteria, and/or  nitrites on the urinalysis result does not correlate with infection.    Hepatitis Panel, Acute [[id number]]  (Abnormal) Collected: 12/22/23 1139    Specimen: Blood Updated: 12/22/23 1401     Hepatitis B Surface Ag Non-Reactive     Hep A IgM Non-Reactive     Hep B C IgM Non-Reactive     Hepatitis C Ab Reactive    Narrative:      Results may be falsely decreased if patient taking Biotin.             Condition on Discharge:  improved    Vital Signs       Discharge Disposition:  Home or Self Care    Discharge Medications:     Discharge Medications        New Medications        Instructions Start Date   FLUoxetine 20 MG capsule  Commonly known as: PROzac   Take 1 capsule by mouth daily. Indications: Major Depressive Disorder      nitrofurantoin (macrocrystal-monohydrate) 100 MG capsule  Commonly known as: MACROBID   100 mg, Oral, Every 12 Hours Scheduled      OLANZapine 5 MG tablet  Commonly known as: zyPREXA   5 mg, Oral, Nightly               Discharge Diet: Normal  Diet Instructions    Resume regular diet.           Activity at Discharge: Normal  Activity Instructions    Activity as tolerated.           Follow-up Appointments  No future appointments.      Test Results Pending at Discharge  None     Time: I spent greater than 30 minutes on this discharge activity which included: face-to-face encounter with the patient, reviewing the data in the system, coordination of the care with the nursing staff as well as consultants, documentation, and entering orders.      Clinician:   Francis Aldrich MD  12/26/23  16:12 EST

## 2023-12-27 ENCOUNTER — HOSPITAL ENCOUNTER (EMERGENCY)
Facility: HOSPITAL | Age: 28
Discharge: HOME | End: 2023-12-27
Attending: STUDENT IN AN ORGANIZED HEALTH CARE EDUCATION/TRAINING PROGRAM | Admitting: STUDENT IN AN ORGANIZED HEALTH CARE EDUCATION/TRAINING PROGRAM
Payer: COMMERCIAL

## 2023-12-27 VITALS
SYSTOLIC BLOOD PRESSURE: 115 MMHG | WEIGHT: 169 LBS | DIASTOLIC BLOOD PRESSURE: 75 MMHG | BODY MASS INDEX: 28.85 KG/M2 | HEIGHT: 64 IN | RESPIRATION RATE: 18 BRPM | TEMPERATURE: 97 F | OXYGEN SATURATION: 99 % | HEART RATE: 75 BPM

## 2023-12-27 DIAGNOSIS — Z13.9 ENCOUNTER FOR MEDICAL SCREENING EXAMINATION: Primary | ICD-10-CM

## 2023-12-27 LAB
ALBUMIN SERPL-MCNC: 3.8 G/DL (ref 3.5–5.2)
ALBUMIN/GLOB SERPL: 1.1 G/DL
ALP SERPL-CCNC: 96 U/L (ref 39–117)
ALT SERPL W P-5'-P-CCNC: 20 U/L (ref 1–33)
AMPHET+METHAMPHET UR QL: NEGATIVE
AMPHETAMINES UR QL: NEGATIVE
ANION GAP SERPL CALCULATED.3IONS-SCNC: 9.2 MMOL/L (ref 5–15)
AST SERPL-CCNC: 23 U/L (ref 1–32)
BACTERIA UR QL AUTO: ABNORMAL /HPF
BARBITURATES UR QL SCN: NEGATIVE
BASOPHILS # BLD AUTO: 0.05 10*3/MM3 (ref 0–0.2)
BASOPHILS NFR BLD AUTO: 1 % (ref 0–1.5)
BENZODIAZ UR QL SCN: NEGATIVE
BILIRUB SERPL-MCNC: 0.3 MG/DL (ref 0–1.2)
BILIRUB UR QL STRIP: NEGATIVE
BUN SERPL-MCNC: 12 MG/DL (ref 6–20)
BUN/CREAT SERPL: 13.6 (ref 7–25)
BUPRENORPHINE SERPL-MCNC: NEGATIVE NG/ML
CALCIUM SPEC-SCNC: 8.9 MG/DL (ref 8.6–10.5)
CANNABINOIDS SERPL QL: NEGATIVE
CHLORIDE SERPL-SCNC: 104 MMOL/L (ref 98–107)
CLARITY UR: ABNORMAL
CO2 SERPL-SCNC: 25.8 MMOL/L (ref 22–29)
COCAINE UR QL: NEGATIVE
COLOR UR: ABNORMAL
CREAT SERPL-MCNC: 0.88 MG/DL (ref 0.57–1)
DEPRECATED RDW RBC AUTO: 42.5 FL (ref 37–54)
EGFRCR SERPLBLD CKD-EPI 2021: 91.9 ML/MIN/1.73
EOSINOPHIL # BLD AUTO: 0.34 10*3/MM3 (ref 0–0.4)
EOSINOPHIL NFR BLD AUTO: 6.8 % (ref 0.3–6.2)
ERYTHROCYTE [DISTWIDTH] IN BLOOD BY AUTOMATED COUNT: 13.7 % (ref 12.3–15.4)
ETHANOL BLD-MCNC: <10 MG/DL (ref 0–10)
ETHANOL UR QL: <0.01 %
FENTANYL UR-MCNC: NEGATIVE NG/ML
GLOBULIN UR ELPH-MCNC: 3.4 GM/DL
GLUCOSE SERPL-MCNC: 92 MG/DL (ref 65–99)
GLUCOSE UR STRIP-MCNC: NEGATIVE MG/DL
HCG SERPL QL: NEGATIVE
HCT VFR BLD AUTO: 42.7 % (ref 34–46.6)
HGB BLD-MCNC: 13.5 G/DL (ref 12–15.9)
HGB UR QL STRIP.AUTO: ABNORMAL
HYALINE CASTS UR QL AUTO: ABNORMAL /LPF
IMM GRANULOCYTES # BLD AUTO: 0.01 10*3/MM3 (ref 0–0.05)
IMM GRANULOCYTES NFR BLD AUTO: 0.2 % (ref 0–0.5)
KETONES UR QL STRIP: NEGATIVE
LEUKOCYTE ESTERASE UR QL STRIP.AUTO: ABNORMAL
LYMPHOCYTES # BLD AUTO: 1.83 10*3/MM3 (ref 0.7–3.1)
LYMPHOCYTES NFR BLD AUTO: 36.7 % (ref 19.6–45.3)
MAGNESIUM SERPL-MCNC: 2.1 MG/DL (ref 1.6–2.6)
MCH RBC QN AUTO: 27.2 PG (ref 26.6–33)
MCHC RBC AUTO-ENTMCNC: 31.6 G/DL (ref 31.5–35.7)
MCV RBC AUTO: 85.9 FL (ref 79–97)
METHADONE UR QL SCN: NEGATIVE
MONOCYTES # BLD AUTO: 0.44 10*3/MM3 (ref 0.1–0.9)
MONOCYTES NFR BLD AUTO: 8.8 % (ref 5–12)
NEUTROPHILS NFR BLD AUTO: 2.32 10*3/MM3 (ref 1.7–7)
NEUTROPHILS NFR BLD AUTO: 46.5 % (ref 42.7–76)
NITRITE UR QL STRIP: NEGATIVE
NRBC BLD AUTO-RTO: 0 /100 WBC (ref 0–0.2)
OPIATES UR QL: NEGATIVE
OXYCODONE UR QL SCN: NEGATIVE
PCP UR QL SCN: NEGATIVE
PH UR STRIP.AUTO: <=5 [PH] (ref 5–8)
PLATELET # BLD AUTO: 224 10*3/MM3 (ref 140–450)
PMV BLD AUTO: 10.1 FL (ref 6–12)
POTASSIUM SERPL-SCNC: 3.9 MMOL/L (ref 3.5–5.2)
PROT SERPL-MCNC: 7.2 G/DL (ref 6–8.5)
PROT UR QL STRIP: NEGATIVE
RBC # BLD AUTO: 4.97 10*6/MM3 (ref 3.77–5.28)
RBC # UR STRIP: ABNORMAL /HPF
REF LAB TEST METHOD: ABNORMAL
SODIUM SERPL-SCNC: 139 MMOL/L (ref 136–145)
SP GR UR STRIP: 1.03 (ref 1–1.03)
SQUAMOUS #/AREA URNS HPF: ABNORMAL /HPF
TRICYCLICS UR QL SCN: NEGATIVE
UROBILINOGEN UR QL STRIP: ABNORMAL
WBC # UR STRIP: ABNORMAL /HPF
WBC NRBC COR # BLD AUTO: 4.99 10*3/MM3 (ref 3.4–10.8)

## 2023-12-27 PROCEDURE — 80053 COMPREHEN METABOLIC PANEL: CPT | Performed by: STUDENT IN AN ORGANIZED HEALTH CARE EDUCATION/TRAINING PROGRAM

## 2023-12-27 PROCEDURE — 84703 CHORIONIC GONADOTROPIN ASSAY: CPT | Performed by: STUDENT IN AN ORGANIZED HEALTH CARE EDUCATION/TRAINING PROGRAM

## 2023-12-27 PROCEDURE — 85025 COMPLETE CBC W/AUTO DIFF WBC: CPT | Performed by: STUDENT IN AN ORGANIZED HEALTH CARE EDUCATION/TRAINING PROGRAM

## 2023-12-27 PROCEDURE — 99285 EMERGENCY DEPT VISIT HI MDM: CPT

## 2023-12-27 PROCEDURE — 83735 ASSAY OF MAGNESIUM: CPT | Performed by: STUDENT IN AN ORGANIZED HEALTH CARE EDUCATION/TRAINING PROGRAM

## 2023-12-27 PROCEDURE — 81001 URINALYSIS AUTO W/SCOPE: CPT | Performed by: STUDENT IN AN ORGANIZED HEALTH CARE EDUCATION/TRAINING PROGRAM

## 2023-12-27 PROCEDURE — 36415 COLL VENOUS BLD VENIPUNCTURE: CPT

## 2023-12-27 PROCEDURE — 80307 DRUG TEST PRSMV CHEM ANLYZR: CPT | Performed by: STUDENT IN AN ORGANIZED HEALTH CARE EDUCATION/TRAINING PROGRAM

## 2023-12-27 PROCEDURE — 82077 ASSAY SPEC XCP UR&BREATH IA: CPT | Performed by: STUDENT IN AN ORGANIZED HEALTH CARE EDUCATION/TRAINING PROGRAM

## 2023-12-27 NOTE — NURSING NOTE
Patient brought in today and [facility] by [facility]. Staff from there reports that they got the patient from the [facility] on the 25th. And since then the patient has not slept  one bit, has been up all the time non stop talking to herself and talking to things like the wall, the telephone having conversations with people that are not there, locking herself in the bathroom screaming . Their concern now is that she is scaring the other residents and staff. They had her evaluated by their provider and they felt that her psychosis is beyond their ability to help her and she cannot come back there. They report that they were instructed to bring her back to the ER here.     Patient appears aloof and somewhat guarded. She states all she knows is they woke her up and she needs a new rehab. Very poor historian and does not want to answer questions. Patient does deny SI or HI. When asked about is she hears voices she refuses to answer. At this time she is a poor historian. She states that she does not know why she is here again. She would just like to call her family and go home. She denies any hallucinations at this time. She states I just did not do well there.

## 2023-12-27 NOTE — NURSING NOTE
Patient states how long do you think it will take me to get into another rehab since I left that one. Instructed patient that depends on the place. Provided pt alist of resources and allowed her to use a phone to call some of the ones on the list.

## 2023-12-27 NOTE — NURSING NOTE
Spoke to Dr. Aldrich via phone. He is familiar with the patient. And if she is not voicing SI or HI and has her medications he is ok with her going home. Instructed that he was going to let her go home and she opted to do the [facility] and that apparently did not work out. Instructed patient is at her baseline and he is ok with her going home. Ok to DC. Instructed to tell her to continue taking her medications and keep her outpatient appt. [id number]

## 2023-12-27 NOTE — ED PROVIDER NOTES
Subjective   History of Present Illness    28 year old female with PMHx of brain tumor, substance abuse, anxiety, depression and prior SI presenting requesting psychiatric evaluation. Patient denies SI or HI. Patient states she was recently released from rehab and has been living with family. States that family has been frustrating her and she is wanting to find some place to get a break from being around them. No other complaints.     Review of Systems    Past Medical History:   Diagnosis Date    Anxiety     Brain tumor     Depression     Substance abuse     Suicidal intent     Suicide attempt        Allergies   Allergen Reactions    Augmentin [Amoxicillin-Pot Clavulanate] Anaphylaxis and Hives    Latex Anaphylaxis and Hives    Phenergan [Promethazine] Anaphylaxis and Hives    Seroquel [Quetiapine] Anaphylaxis and Hives    Trazodone Unknown (See Comments)     Seizures        Past Surgical History:   Procedure Laterality Date    TONSILLECTOMY         History reviewed. No pertinent family history.    Social History     Socioeconomic History    Marital status: Single    Number of children: 2    Highest education level: GED or equivalent   Tobacco Use    Smoking status: Every Day     Packs/day: 1.00     Years: 16.00     Additional pack years: 0.00     Total pack years: 16.00     Types: Cigarettes    Smokeless tobacco: Never    Tobacco comments:     began smoking at the age of 10   Vaping Use    Vaping Use: Every day    Substances: Nicotine, Flavoring    Devices: Disposable   Substance and Sexual Activity    Alcohol use: Not Currently    Drug use: Yes     Types: Methamphetamines     Comment: suboxone    Sexual activity: Defer     Partners: Male           Objective   Physical Exam  Vitals and nursing note reviewed.   Constitutional:       General: She is not in acute distress.  HENT:      Head: Normocephalic.      Mouth/Throat:      Mouth: Mucous membranes are moist.   Cardiovascular:      Rate and Rhythm: Normal rate  and regular rhythm.      Pulses: Normal pulses.   Pulmonary:      Effort: Pulmonary effort is normal.      Breath sounds: Normal breath sounds.   Abdominal:      General: Abdomen is flat. There is no distension.      Tenderness: There is no abdominal tenderness.   Musculoskeletal:      Cervical back: Normal range of motion and neck supple.   Skin:     General: Skin is warm and dry.   Neurological:      Mental Status: She is alert.   Psychiatric:      Comments: No SI or HI. No visual or auditory hallucinations.         Procedures           ED Course                                             Medical Decision Making  Problems Addressed:  Encounter for medical screening examination: complicated acute illness or injury    Amount and/or Complexity of Data Reviewed  Labs: ordered.      Evaluated by psychiatric team. Doesn't require inpatient psychiatric treatment at this time. No other complaints. Well appearing. Doesn't appear to be in psychiatric crisis. Discharged with instructions for outpatient follow up.    Final diagnoses:   Encounter for medical screening examination       ED Disposition  ED Disposition       ED Disposition   Discharge    Condition   Stable    Comment   --               Provider, No Known  [facility]  [place] [address]  [phone]               Medication List      No changes were made to your prescriptions during this visit.            Lucretia Sesay MD  12/28/23 [id number]

## 2023-12-27 NOTE — NURSING NOTE
Called and updated Dr. Aldrich. Instructed him that now the patient is alert and talking with staff and family on the phone. Instructed that hopefully she will use the resources. [id number]

## 2023-12-27 NOTE — NURSING NOTE
Patient on the phone with family at this time. Appears alert at this point talking with family. Pt reports to family she just did not like it there. Patient states I will get them to come and get me. She does not wish to stay. Patient asked why she did not want to do assessment earlier and she states I was afraid Id be staying here and I really just wanted out of that place. And they could not get me a ride.

## 2023-12-27 NOTE — NURSING NOTE
Eugene Mcknight.  He has met with the patient regarding IOP and as far as transportation he He was able to call and get the patient a ride home with [facility]. He will give us a ETA shortly. Made pt aware and also Dr. Sesay of [place] plan

## 2024-05-07 ENCOUNTER — OFFICE VISIT (OUTPATIENT)
Dept: OBSTETRICS AND GYNECOLOGY | Facility: CLINIC | Age: 29
End: 2024-05-07
Payer: COMMERCIAL

## 2024-05-07 VITALS
BODY MASS INDEX: 29.52 KG/M2 | DIASTOLIC BLOOD PRESSURE: 69 MMHG | SYSTOLIC BLOOD PRESSURE: 100 MMHG | HEART RATE: 74 BPM | WEIGHT: 172 LBS

## 2024-05-07 DIAGNOSIS — N92.6 IRREGULAR MENSTRUAL CYCLE: ICD-10-CM

## 2024-05-07 DIAGNOSIS — R10.2 PELVIC PAIN: Primary | ICD-10-CM

## 2024-05-07 LAB
DEPRECATED RDW RBC AUTO: 46.9 FL (ref 37–54)
ERYTHROCYTE [DISTWIDTH] IN BLOOD BY AUTOMATED COUNT: 13.9 % (ref 12.3–15.4)
HCT VFR BLD AUTO: 42.8 % (ref 34–46.6)
HGB BLD-MCNC: 13.9 G/DL (ref 12–15.9)
MCH RBC QN AUTO: 30.1 PG (ref 26.6–33)
MCHC RBC AUTO-ENTMCNC: 32.5 G/DL (ref 31.5–35.7)
MCV RBC AUTO: 92.6 FL (ref 79–97)
PLATELET # BLD AUTO: 188 10*3/MM3 (ref 140–450)
PMV BLD AUTO: 11.4 FL (ref 6–12)
RBC # BLD AUTO: 4.62 10*6/MM3 (ref 3.77–5.28)
T4 FREE SERPL-MCNC: 1.33 NG/DL (ref 0.93–1.7)
TSH SERPL DL<=0.05 MIU/L-ACNC: 1.82 UIU/ML (ref 0.27–4.2)
WBC NRBC COR # BLD AUTO: 6.39 10*3/MM3 (ref 3.4–10.8)

## 2024-05-07 PROCEDURE — 84443 ASSAY THYROID STIM HORMONE: CPT | Performed by: NURSE PRACTITIONER

## 2024-05-07 PROCEDURE — 87801 DETECT AGNT MULT DNA AMPLI: CPT | Performed by: NURSE PRACTITIONER

## 2024-05-07 PROCEDURE — 85027 COMPLETE CBC AUTOMATED: CPT | Performed by: NURSE PRACTITIONER

## 2024-05-07 PROCEDURE — 87798 DETECT AGENT NOS DNA AMP: CPT | Performed by: NURSE PRACTITIONER

## 2024-05-07 PROCEDURE — 87591 N.GONORRHOEAE DNA AMP PROB: CPT | Performed by: NURSE PRACTITIONER

## 2024-05-07 PROCEDURE — 84439 ASSAY OF FREE THYROXINE: CPT | Performed by: NURSE PRACTITIONER

## 2024-05-07 PROCEDURE — 1159F MED LIST DOCD IN RCRD: CPT | Performed by: NURSE PRACTITIONER

## 2024-05-07 PROCEDURE — 87661 TRICHOMONAS VAGINALIS AMPLIF: CPT | Performed by: NURSE PRACTITIONER

## 2024-05-07 PROCEDURE — 1160F RVW MEDS BY RX/DR IN RCRD: CPT | Performed by: NURSE PRACTITIONER

## 2024-05-07 PROCEDURE — 99204 OFFICE O/P NEW MOD 45 MIN: CPT | Performed by: NURSE PRACTITIONER

## 2024-05-07 PROCEDURE — 87491 CHLMYD TRACH DNA AMP PROBE: CPT | Performed by: NURSE PRACTITIONER

## 2024-05-07 RX ORDER — DOXYCYCLINE 100 MG/1
100 CAPSULE ORAL 2 TIMES DAILY
Qty: 14 CAPSULE | Refills: 0 | Status: SHIPPED | OUTPATIENT
Start: 2024-05-07 | End: 2024-05-14

## 2024-05-07 RX ORDER — ERGOCALCIFEROL 1.25 MG/1
CAPSULE ORAL
COMMUNITY
Start: 2024-03-20

## 2024-05-08 ENCOUNTER — TELEPHONE (OUTPATIENT)
Dept: OBSTETRICS AND GYNECOLOGY | Facility: CLINIC | Age: 29
End: 2024-05-08
Payer: COMMERCIAL

## 2024-05-09 ENCOUNTER — TELEPHONE (OUTPATIENT)
Dept: OBSTETRICS AND GYNECOLOGY | Facility: CLINIC | Age: 29
End: 2024-05-09
Payer: COMMERCIAL

## 2024-05-09 LAB
A VAGINAE DNA VAG QL NAA+PROBE: NORMAL SCORE
BVAB2 DNA VAG QL NAA+PROBE: NORMAL SCORE
C ALBICANS DNA VAG QL NAA+PROBE: NEGATIVE
C GLABRATA DNA VAG QL NAA+PROBE: NEGATIVE
C TRACH DNA VAG QL NAA+PROBE: NEGATIVE
MEGA1 DNA VAG QL NAA+PROBE: NORMAL SCORE
N GONORRHOEA DNA VAG QL NAA+PROBE: NEGATIVE
T VAGINALIS DNA VAG QL NAA+PROBE: NEGATIVE

## 2024-05-21 ENCOUNTER — HOSPITAL ENCOUNTER (OUTPATIENT)
Dept: ULTRASOUND IMAGING | Facility: HOSPITAL | Age: 29
Discharge: HOME | End: 2024-05-21
Admitting: NURSE PRACTITIONER
Payer: COMMERCIAL

## 2024-05-21 DIAGNOSIS — R10.2 PELVIC PAIN: ICD-10-CM

## 2024-05-21 DIAGNOSIS — N92.6 IRREGULAR MENSTRUAL CYCLE: ICD-10-CM

## 2024-05-21 DIAGNOSIS — N83.201 CYST OF RIGHT OVARY: Primary | ICD-10-CM

## 2024-05-21 PROCEDURE — 76830 TRANSVAGINAL US NON-OB: CPT

## 2024-05-21 NOTE — PROGRESS NOTES
Called patient left detailed message regarding all information advised to call back if any more questions or concerns.

## 2024-05-21 NOTE — PROGRESS NOTES
Patient aware of results. Voiced was not on cycle when ultrasound was done and did not have cycle right before or after has no cycle in few months. Wants to have bhcg drawn when she comes in. Patient concerned about the nonspecific fluid in her endocervix.

## 2024-05-28 ENCOUNTER — TELEPHONE (OUTPATIENT)
Dept: OBSTETRICS AND GYNECOLOGY | Facility: CLINIC | Age: 29
End: 2024-05-28
Payer: COMMERCIAL

## 2024-05-28 NOTE — TELEPHONE ENCOUNTER
Please let patient know her pap last year was done on 3/1/23, ASCUS, +HPV.  Recommend annual exam with pap at her convenience.

## 2024-05-30 ENCOUNTER — TELEPHONE (OUTPATIENT)
Dept: OBSTETRICS AND GYNECOLOGY | Facility: CLINIC | Age: 29
End: 2024-05-30
Payer: COMMERCIAL

## 2024-06-06 ENCOUNTER — HOSPITAL ENCOUNTER (EMERGENCY)
Facility: HOSPITAL | Age: 29
Discharge: HOME | End: 2024-06-06
Attending: EMERGENCY MEDICINE | Admitting: EMERGENCY MEDICINE
Payer: COMMERCIAL

## 2024-06-06 ENCOUNTER — APPOINTMENT (OUTPATIENT)
Dept: CT IMAGING | Facility: HOSPITAL | Age: 29
End: 2024-06-06
Payer: COMMERCIAL

## 2024-06-06 ENCOUNTER — TELEPHONE (OUTPATIENT)
Dept: OBSTETRICS AND GYNECOLOGY | Facility: CLINIC | Age: 29
End: 2024-06-06
Payer: COMMERCIAL

## 2024-06-06 VITALS
BODY MASS INDEX: 29.36 KG/M2 | SYSTOLIC BLOOD PRESSURE: 98 MMHG | WEIGHT: 171.96 LBS | HEIGHT: 64 IN | TEMPERATURE: 97.6 F | OXYGEN SATURATION: 100 % | HEART RATE: 57 BPM | RESPIRATION RATE: 16 BRPM | DIASTOLIC BLOOD PRESSURE: 67 MMHG

## 2024-06-06 DIAGNOSIS — R51.9 ACUTE NONINTRACTABLE HEADACHE, UNSPECIFIED HEADACHE TYPE: Primary | ICD-10-CM

## 2024-06-06 LAB
ALBUMIN SERPL-MCNC: 4.1 G/DL (ref 3.5–5.2)
ALBUMIN/GLOB SERPL: 2.2 G/DL
ALP SERPL-CCNC: 53 U/L (ref 39–117)
ALT SERPL W P-5'-P-CCNC: 9 U/L (ref 1–33)
ANION GAP SERPL CALCULATED.3IONS-SCNC: 10.3 MMOL/L (ref 5–15)
AST SERPL-CCNC: 12 U/L (ref 1–32)
BASOPHILS # BLD AUTO: 0.06 10*3/MM3 (ref 0–0.2)
BASOPHILS NFR BLD AUTO: 0.7 % (ref 0–1.5)
BILIRUB SERPL-MCNC: 0.3 MG/DL (ref 0–1.2)
BUN SERPL-MCNC: 9 MG/DL (ref 6–20)
BUN/CREAT SERPL: 10.8 (ref 7–25)
CALCIUM SPEC-SCNC: 9.2 MG/DL (ref 8.6–10.5)
CHLORIDE SERPL-SCNC: 107 MMOL/L (ref 98–107)
CO2 SERPL-SCNC: 22.7 MMOL/L (ref 22–29)
CREAT SERPL-MCNC: 0.83 MG/DL (ref 0.57–1)
DEPRECATED RDW RBC AUTO: 45.6 FL (ref 37–54)
EGFRCR SERPLBLD CKD-EPI 2021: 98.6 ML/MIN/1.73
EOSINOPHIL # BLD AUTO: 0.2 10*3/MM3 (ref 0–0.4)
EOSINOPHIL NFR BLD AUTO: 2.4 % (ref 0.3–6.2)
ERYTHROCYTE [DISTWIDTH] IN BLOOD BY AUTOMATED COUNT: 13.9 % (ref 12.3–15.4)
GLOBULIN UR ELPH-MCNC: 1.9 GM/DL
GLUCOSE SERPL-MCNC: 102 MG/DL (ref 65–99)
HCT VFR BLD AUTO: 38.7 % (ref 34–46.6)
HGB BLD-MCNC: 13.2 G/DL (ref 12–15.9)
HOLD SPECIMEN: NORMAL
HOLD SPECIMEN: NORMAL
IMM GRANULOCYTES # BLD AUTO: 0.04 10*3/MM3 (ref 0–0.05)
IMM GRANULOCYTES NFR BLD AUTO: 0.5 % (ref 0–0.5)
LYMPHOCYTES # BLD AUTO: 2.83 10*3/MM3 (ref 0.7–3.1)
LYMPHOCYTES NFR BLD AUTO: 34.2 % (ref 19.6–45.3)
MCH RBC QN AUTO: 30.6 PG (ref 26.6–33)
MCHC RBC AUTO-ENTMCNC: 34.1 G/DL (ref 31.5–35.7)
MCV RBC AUTO: 89.6 FL (ref 79–97)
MONOCYTES # BLD AUTO: 0.54 10*3/MM3 (ref 0.1–0.9)
MONOCYTES NFR BLD AUTO: 6.5 % (ref 5–12)
NEUTROPHILS NFR BLD AUTO: 4.6 10*3/MM3 (ref 1.7–7)
NEUTROPHILS NFR BLD AUTO: 55.7 % (ref 42.7–76)
NRBC BLD AUTO-RTO: 0 /100 WBC (ref 0–0.2)
PLATELET # BLD AUTO: 172 10*3/MM3 (ref 140–450)
PMV BLD AUTO: 10.6 FL (ref 6–12)
POTASSIUM SERPL-SCNC: 3.4 MMOL/L (ref 3.5–5.2)
PROT SERPL-MCNC: 6 G/DL (ref 6–8.5)
RBC # BLD AUTO: 4.32 10*6/MM3 (ref 3.77–5.28)
SODIUM SERPL-SCNC: 140 MMOL/L (ref 136–145)
WBC NRBC COR # BLD AUTO: 8.27 10*3/MM3 (ref 3.4–10.8)
WHOLE BLOOD HOLD COAG: NORMAL
WHOLE BLOOD HOLD SPECIMEN: NORMAL

## 2024-06-06 PROCEDURE — 25010000002 METOCLOPRAMIDE PER 10 MG

## 2024-06-06 PROCEDURE — 25010000002 DEXAMETHASONE SODIUM PHOSPHATE 10 MG/ML SOLUTION

## 2024-06-06 PROCEDURE — 25810000003 SODIUM CHLORIDE 0.9 % SOLUTION

## 2024-06-06 PROCEDURE — 25010000002 DIPHENHYDRAMINE PER 50 MG

## 2024-06-06 PROCEDURE — 96375 TX/PRO/DX INJ NEW DRUG ADDON: CPT

## 2024-06-06 PROCEDURE — 70450 CT HEAD/BRAIN W/O DYE: CPT

## 2024-06-06 PROCEDURE — 96361 HYDRATE IV INFUSION ADD-ON: CPT

## 2024-06-06 PROCEDURE — 96374 THER/PROPH/DIAG INJ IV PUSH: CPT

## 2024-06-06 PROCEDURE — 80053 COMPREHEN METABOLIC PANEL: CPT | Performed by: EMERGENCY MEDICINE

## 2024-06-06 PROCEDURE — 99284 EMERGENCY DEPT VISIT MOD MDM: CPT

## 2024-06-06 PROCEDURE — 25010000002 KETOROLAC TROMETHAMINE PER 15 MG

## 2024-06-06 PROCEDURE — 85025 COMPLETE CBC W/AUTO DIFF WBC: CPT | Performed by: EMERGENCY MEDICINE

## 2024-06-06 RX ORDER — SODIUM CHLORIDE 0.9 % (FLUSH) 0.9 %
10 SYRINGE (ML) INJECTION AS NEEDED
Status: DISCONTINUED | OUTPATIENT
Start: 2024-06-06 | End: 2024-06-06 | Stop reason: HOSPADM

## 2024-06-06 RX ORDER — METOCLOPRAMIDE HYDROCHLORIDE 5 MG/ML
10 INJECTION INTRAMUSCULAR; INTRAVENOUS ONCE
Status: COMPLETED | OUTPATIENT
Start: 2024-06-06 | End: 2024-06-06

## 2024-06-06 RX ORDER — DEXAMETHASONE SODIUM PHOSPHATE 10 MG/ML
10 INJECTION, SOLUTION INTRAMUSCULAR; INTRAVENOUS ONCE
Status: COMPLETED | OUTPATIENT
Start: 2024-06-06 | End: 2024-06-06

## 2024-06-06 RX ORDER — KETOROLAC TROMETHAMINE 30 MG/ML
30 INJECTION, SOLUTION INTRAMUSCULAR; INTRAVENOUS ONCE
Status: COMPLETED | OUTPATIENT
Start: 2024-06-06 | End: 2024-06-06

## 2024-06-06 RX ORDER — FLUOXETINE 10 MG/1
10 CAPSULE ORAL DAILY
COMMUNITY

## 2024-06-06 RX ORDER — TOPIRAMATE SPINKLE 25 MG/1
25 CAPSULE ORAL 2 TIMES DAILY
COMMUNITY

## 2024-06-06 RX ORDER — DIPHENHYDRAMINE HYDROCHLORIDE 50 MG/ML
25 INJECTION INTRAMUSCULAR; INTRAVENOUS ONCE
Status: COMPLETED | OUTPATIENT
Start: 2024-06-06 | End: 2024-06-06

## 2024-06-06 RX ADMIN — DEXAMETHASONE SODIUM PHOSPHATE 10 MG: 10 INJECTION INTRAMUSCULAR; INTRAVENOUS at 15:30

## 2024-06-06 RX ADMIN — METOCLOPRAMIDE HYDROCHLORIDE 10 MG: 5 INJECTION INTRAMUSCULAR; INTRAVENOUS at 15:34

## 2024-06-06 RX ADMIN — SODIUM CHLORIDE 1000 ML: 9 INJECTION, SOLUTION INTRAVENOUS at 15:27

## 2024-06-06 RX ADMIN — KETOROLAC TROMETHAMINE 30 MG: 30 INJECTION, SOLUTION INTRAMUSCULAR; INTRAVENOUS at 15:32

## 2024-06-06 RX ADMIN — DIPHENHYDRAMINE HYDROCHLORIDE 25 MG: 50 INJECTION, SOLUTION INTRAMUSCULAR; INTRAVENOUS at 15:29

## 2024-06-06 NOTE — ED PROVIDER NOTES
Time: 4:32 PM EDT  Date of encounter:  6/6/2024  Independent Historian/Clinical History and Information was obtained by:   Patient    History is limited by: N/A    Chief Complaint: Headache      History of Present Illness:  Patient is a 28 y.o. year old female who presents to the emergency department for evaluation of headache ongoing for 1 hour prior to arrival.  Patient reports sensitivity to light and sound.  Reports nausea without vomiting.  Reports history of benign brain tumor without complication.  Denies any blurred vision or dizziness.  Reports similar headaches in the past.  Denies any fevers, chills, body aches.    HPI    Patient Care Team  Primary Care Provider: Eri Trujillo APRN    Past Medical History:     Allergies   Allergen Reactions    Augmentin [Amoxicillin-Pot Clavulanate] Anaphylaxis and Hives    Latex Anaphylaxis and Hives    Phenergan [Promethazine] Anaphylaxis and Hives    Seroquel [Quetiapine] Anaphylaxis and Hives    Trazodone Unknown (See Comments)     Seizures      Past Medical History:   Diagnosis Date    Abnormal Pap smear of cervix     Anxiety     Brain tumor     Depression     Substance abuse     Suicidal intent     Suicide attempt     Syphilis      Past Surgical History:   Procedure Laterality Date    TONSILLECTOMY       Family History   Problem Relation Age of Onset    Breast cancer Neg Hx     Ovarian cancer Neg Hx     Uterine cancer Neg Hx     Colon cancer Neg Hx     Melanoma Neg Hx     Prostate cancer Neg Hx     Stroke Neg Hx     Coronary artery disease Neg Hx     Pulmonary embolism Neg Hx     Deep vein thrombosis Neg Hx     Osteoporosis Neg Hx     Diabetes Neg Hx     Hypertension Neg Hx        Home Medications:  Prior to Admission medications    Medication Sig Start Date End Date Taking? Authorizing Provider   FLUoxetine (PROzac) 10 MG capsule Take 1 capsule by mouth Daily.    Provider, MD Joseluis   topiramate (TOPAMAX) 25 MG capsule (sprinkle) Take 1 capsule by mouth 2  "(Two) Times a Day.    Provider, MD Joseluis   vitamin D (ERGOCALCIFEROL) 1.25 MG (41413 UT) capsule capsule  3/20/24   ProviderJoseluis MD   traZODone (DESYREL) 50 MG tablet Take 1 tablet by mouth At Night As Needed for Sleep. Indications: Trouble Sleeping 7/1/22 7/1/22  Augie Philippe MD        Social History:   Social History     Tobacco Use    Smoking status: Every Day     Current packs/day: 1.00     Average packs/day: 1 pack/day for 16.0 years (16.0 ttl pk-yrs)     Types: Cigarettes    Smokeless tobacco: Never    Tobacco comments:     began smoking at the age of 10   Vaping Use    Vaping status: Every Day    Substances: Nicotine, Flavoring    Devices: Disposable   Substance Use Topics    Alcohol use: Not Currently    Drug use: Not Currently     Types: Methamphetamines     Comment: suboxone         Review of Systems:  Review of Systems   Constitutional:  Negative for chills, fatigue and fever.   HENT:  Negative for ear pain, rhinorrhea and sore throat.    Eyes:  Negative for visual disturbance.   Respiratory:  Negative for cough and shortness of breath.    Cardiovascular:  Negative for chest pain.   Gastrointestinal:  Positive for nausea. Negative for abdominal pain, diarrhea and vomiting.   Genitourinary:  Negative for difficulty urinating.   Musculoskeletal:  Negative for arthralgias, back pain and myalgias.   Skin:  Negative for rash.   Neurological:  Positive for headaches. Negative for light-headedness.   Hematological:  Negative for adenopathy.   Psychiatric/Behavioral: Negative.          Physical Exam:  BP 99/70 (BP Location: Left arm, Patient Position: Lying)   Pulse 66   Temp 97.6 °F (36.4 °C) (Oral)   Resp 16   Ht 162.6 cm (64\")   Wt 78 kg (171 lb 15.3 oz)   SpO2 100%   BMI 29.52 kg/m²     Physical Exam  Vitals and nursing note reviewed.   Constitutional:       General: She is not in acute distress.     Appearance: Normal appearance. She is not toxic-appearing.   HENT:      Head: " Normocephalic and atraumatic.      Nose: Nose normal.      Mouth/Throat:      Mouth: Mucous membranes are moist.   Eyes:      Extraocular Movements: Extraocular movements intact.      Conjunctiva/sclera: Conjunctivae normal.      Pupils: Pupils are equal, round, and reactive to light.   Cardiovascular:      Rate and Rhythm: Normal rate.      Pulses: Normal pulses.      Heart sounds: Normal heart sounds.   Pulmonary:      Effort: Pulmonary effort is normal.      Breath sounds: Normal breath sounds.   Abdominal:      General: Bowel sounds are normal.      Palpations: Abdomen is soft.      Tenderness: There is no abdominal tenderness.   Musculoskeletal:         General: Normal range of motion.      Cervical back: Normal range of motion.   Skin:     General: Skin is warm and dry.   Neurological:      General: No focal deficit present.      Mental Status: She is alert and oriented to person, place, and time.   Psychiatric:         Mood and Affect: Mood normal.         Behavior: Behavior normal.         Thought Content: Thought content normal.         Judgment: Judgment normal.                  Procedures:  Procedures      Medical Decision Making:      Comorbidities that affect care:    Benign brain tumor    External Notes reviewed:    None      The following orders were placed and all results were independently analyzed by me:  Orders Placed This Encounter   Procedures    CT Head Without Contrast    Comprehensive Metabolic Panel    Leroy Draw    CBC Auto Differential    Insert Peripheral IV    CBC & Differential    Green Top (Gel)    Lavender Top    Gold Top - SST    Light Blue Top       Medications Given in the Emergency Department:  Medications   sodium chloride 0.9 % flush 10 mL (has no administration in time range)   sodium chloride 0.9 % bolus 1,000 mL (1,000 mL Intravenous New Bag 6/6/24 1527)   metoclopramide (REGLAN) injection 10 mg (10 mg Intravenous Given 6/6/24 1534)   ketorolac (TORADOL) injection 30 mg (30  mg Intravenous Given 6/6/24 1532)   diphenhydrAMINE (BENADRYL) injection 25 mg (25 mg Intravenous Given 6/6/24 1529)   dexAMETHasone sodium phosphate injection 10 mg (10 mg Intravenous Given 6/6/24 1530)        ED Course:    ED Course as of 06/06/24 1638   Thu Jun 06, 2024   1631 Patient reports headache is completely resolved at this time. [CE]      ED Course User Index  [CE] Holly Garrett, JOSE F       Labs:    Lab Results (last 24 hours)       Procedure Component Value Units Date/Time    CBC & Differential [[id number]]  (Normal) Collected: 06/06/24 1443    Specimen: Blood Updated: 06/06/24 1453    Narrative:      The following orders were created for panel order CBC & Differential.  Procedure                               Abnormality         Status                     ---------                               -----------         ------                     CBC Auto Differential[[id number]]        Normal              Final result                 Please view results for these tests on the individual orders.    Comprehensive Metabolic Panel [[id number]]  (Abnormal) Collected: 06/06/24 1443    Specimen: Blood Updated: 06/06/24 1514     Glucose 102 mg/dL      BUN 9 mg/dL      Creatinine 0.83 mg/dL      Sodium 140 mmol/L      Potassium 3.4 mmol/L      Chloride 107 mmol/L      CO2 22.7 mmol/L      Calcium 9.2 mg/dL      Total Protein 6.0 g/dL      Albumin 4.1 g/dL      ALT (SGPT) 9 U/L      AST (SGOT) 12 U/L      Alkaline Phosphatase 53 U/L      Total Bilirubin 0.3 mg/dL      Globulin 1.9 gm/dL      A/G Ratio 2.2 g/dL      BUN/Creatinine Ratio 10.8     Anion Gap 10.3 mmol/L      eGFR 98.6 mL/min/1.73     Narrative:      GFR Normal >60  Chronic Kidney Disease <60  Kidney Failure <15      CBC Auto Differential [[id number]]  (Normal) Collected: 06/06/24 1443    Specimen: Blood Updated: 06/06/24 1453     WBC 8.27 10*3/mm3      RBC 4.32 10*6/mm3      Hemoglobin 13.2 g/dL      Hematocrit 38.7 %      MCV 89.6 fL      MCH 30.6 pg       MCHC 34.1 g/dL      RDW 13.9 %      RDW-SD 45.6 fl      MPV 10.6 fL      Platelets 172 10*3/mm3      Neutrophil % 55.7 %      Lymphocyte % 34.2 %      Monocyte % 6.5 %      Eosinophil % 2.4 %      Basophil % 0.7 %      Immature Grans % 0.5 %      Neutrophils, Absolute 4.60 10*3/mm3      Lymphocytes, Absolute 2.83 10*3/mm3      Monocytes, Absolute 0.54 10*3/mm3      Eosinophils, Absolute 0.20 10*3/mm3      Basophils, Absolute 0.06 10*3/mm3      Immature Grans, Absolute 0.04 10*3/mm3      nRBC 0.0 /100 WBC              Imaging:    CT Head Without Contrast    Result Date: 6/6/2024  CT HEAD WO CONTRAST Date of Exam: 6/6/2024 3:44 PM EDT Indication: headache. Comparison: July 19, 2022 Technique: Axial CT images were obtained of the head without contrast administration.  Reconstructed coronal and sagittal images were also obtained. Automated exposure control and iterative construction methods were used. Findings: No acute intracranial hemorrhage or extra-axial collection is identified. The ventricles appear stable in caliber, with no midline shift. The basal cisterns appear patent. The gray-white differentiation appears preserved. A 2.3 cm peripherally hyperdense  lesion along the right caudate head appears unchanged. The calvarium appears intact. The paranasal sinuses and mastoid air cells are well-aerated.     Impression: 1.No acute intracranial process identified. 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 Electronically Signed: Souleymane Walden MD  6/6/2024 3:57 PM EDT  Workstation ID: [id number]       Differential Diagnosis and Discussion:    Headache: Differential diagnosis includes but is not limited to migraine, cluster headache, hypertension, tumor, subarachnoid bleeding, pseudotumor cerebri, temporal arteritis, infections, tension headache, and TMJ syndrome.    All labs were reviewed and interpreted by me.  CT scan radiology impression was interpreted by  me.    MDM  Number of Diagnoses or Management Options  Acute nonintractable headache, unspecified headache type  Diagnosis management comments: The patient presented to the emergency department with a headache. The patient is now resting comfortably in feels better, is alert, talkative, interactive and in no distress after ED treatment. The patient appears well and is able to tolerate PO fluids. Repeat examination is unremarkable and benign. The patient is neurologically intact, has a normal mental status, and this ambulatory in the ED. The history, exam, diagnostic testing (if any) and the patient's current condition do not suggest meningitis, stroke, sepsis, subarachnoid hemorrhage, intracranial bleeding, encephalitis, temporal arteritis or other significant pathology to warrant further testing, continued ED treatment, admission, neurological consultation, for other specialist evaluation at this point. The vital signs have been stable. The patient's condition is stable and appropriate for discharge. The patient will pursue further outpatient evaluation with the primary care physician or other designated or consulting physician as indicated in the discharge instructions.              Patient Care Considerations:    NARCOTICS: I considered prescribing opiate pain medication as an outpatient, however patient's pain is manageable without the use of narcotics in the ED.      Consultants/Shared Management Plan:    None    Social Determinants of Health:    Patient is independent, reliable, and has access to care.       Disposition and Care Coordination:    Discharged: The patient is suitable and stable for discharge with no need for consideration of admission.    I have explained the patient´s condition, diagnoses and treatment plan based on the information available to me at this time. I have answered questions and addressed any concerns. The patient has a good  understanding of the patient´s diagnosis, condition, and  treatment plan as can be expected at this point. The vital signs have been stable. The patient´s condition is stable and appropriate for discharge from the emergency department.      The patient will pursue further outpatient evaluation with the primary care physician or other designated or consulting physician as outlined in the discharge instructions. They are agreeable to this plan of care and follow-up instructions have been explained in detail. The patient has received these instructions in written format and has expressed an understanding of the discharge instructions. The patient is aware that any significant change in condition or worsening of symptoms should prompt an immediate return to this or the closest emergency department or call to 1.  I have explained discharge medications and the need for follow up with the patient/caretakers. This was also printed in the discharge instructions. Patient was discharged with the following medications and follow up:      Medication List      No changes were made to your prescriptions during this visit.      Eri Trujillo, JOSE F  [address] [facility] [address]  [phone]    Go to   As needed       Final diagnoses:   Acute nonintractable headache, unspecified headache type        ED Disposition       ED Disposition   Discharge    Condition   Stable    Comment   --               This medical record created using voice recognition software.             Holly Garrett, APRN  06/06/24 [id number]

## 2024-06-06 NOTE — DISCHARGE INSTRUCTIONS
Please follow with your primary care provider as needed.  Return to the ED if you have worsening headache, intractable vomiting, fevers greater than 100.4.

## 2024-06-17 ENCOUNTER — TELEPHONE (OUTPATIENT)
Dept: OBSTETRICS AND GYNECOLOGY | Facility: CLINIC | Age: 29
End: 2024-06-17
Payer: COMMERCIAL